# Patient Record
Sex: MALE | Race: WHITE | NOT HISPANIC OR LATINO
[De-identification: names, ages, dates, MRNs, and addresses within clinical notes are randomized per-mention and may not be internally consistent; named-entity substitution may affect disease eponyms.]

---

## 2017-02-27 ENCOUNTER — NON-APPOINTMENT (OUTPATIENT)
Age: 67
End: 2017-02-27

## 2017-02-27 ENCOUNTER — APPOINTMENT (OUTPATIENT)
Dept: CARDIOLOGY | Facility: CLINIC | Age: 67
End: 2017-02-27

## 2017-02-27 VITALS
BODY MASS INDEX: 28.79 KG/M2 | DIASTOLIC BLOOD PRESSURE: 83 MMHG | SYSTOLIC BLOOD PRESSURE: 129 MMHG | OXYGEN SATURATION: 98 % | WEIGHT: 190 LBS | HEART RATE: 98 BPM | HEIGHT: 68 IN | RESPIRATION RATE: 17 BRPM

## 2017-02-27 DIAGNOSIS — R13.10 DYSPHAGIA, UNSPECIFIED: ICD-10-CM

## 2017-04-20 ENCOUNTER — TRANSCRIPTION ENCOUNTER (OUTPATIENT)
Age: 67
End: 2017-04-20

## 2017-05-15 ENCOUNTER — APPOINTMENT (OUTPATIENT)
Dept: CARDIOLOGY | Facility: CLINIC | Age: 67
End: 2017-05-15

## 2017-06-12 ENCOUNTER — APPOINTMENT (OUTPATIENT)
Dept: CARDIOLOGY | Facility: CLINIC | Age: 67
End: 2017-06-12

## 2017-06-13 ENCOUNTER — MESSAGE (OUTPATIENT)
Age: 67
End: 2017-06-13

## 2017-09-11 ENCOUNTER — APPOINTMENT (OUTPATIENT)
Dept: CARDIOLOGY | Facility: CLINIC | Age: 67
End: 2017-09-11

## 2018-02-26 ENCOUNTER — MEDICATION RENEWAL (OUTPATIENT)
Age: 68
End: 2018-02-26

## 2018-03-16 LAB
25(OH)D3 SERPL-MCNC: 31.7 NG/ML
ALBUMIN SERPL ELPH-MCNC: 4.7 G/DL
ALP BLD-CCNC: 47 U/L
ALT SERPL-CCNC: 12 U/L
ANION GAP SERPL CALC-SCNC: 18 MMOL/L
APPEARANCE: CLEAR
AST SERPL-CCNC: 18 U/L
BACTERIA: NEGATIVE
BASOPHILS # BLD AUTO: 0.02 K/UL
BASOPHILS NFR BLD AUTO: 0.3 %
BILIRUB SERPL-MCNC: 0.8 MG/DL
BILIRUBIN URINE: NEGATIVE
BLOOD URINE: NEGATIVE
BUN SERPL-MCNC: 15 MG/DL
CALCIUM SERPL-MCNC: 10 MG/DL
CHLORIDE SERPL-SCNC: 107 MMOL/L
CHOLEST SERPL-MCNC: 132 MG/DL
CHOLEST/HDLC SERPL: 3.9 RATIO
CO2 SERPL-SCNC: 23 MMOL/L
COLOR: YELLOW
CREAT SERPL-MCNC: 1.09 MG/DL
EOSINOPHIL # BLD AUTO: 0.07 K/UL
EOSINOPHIL NFR BLD AUTO: 0.9 %
GLUCOSE QUALITATIVE U: NEGATIVE MG/DL
GLUCOSE SERPL-MCNC: 110 MG/DL
HBA1C MFR BLD HPLC: 6.7 %
HCT VFR BLD CALC: 41.8 %
HDLC SERPL-MCNC: 34 MG/DL
HGB BLD-MCNC: 14.3 G/DL
HYALINE CASTS: 3 /LPF
IMM GRANULOCYTES NFR BLD AUTO: 0 %
KETONES URINE: ABNORMAL
LDLC SERPL CALC-MCNC: 68 MG/DL
LEUKOCYTE ESTERASE URINE: NEGATIVE
LYMPHOCYTES # BLD AUTO: 2.64 K/UL
LYMPHOCYTES NFR BLD AUTO: 35 %
MAN DIFF?: NORMAL
MCHC RBC-ENTMCNC: 32.9 PG
MCHC RBC-ENTMCNC: 34.2 GM/DL
MCV RBC AUTO: 96.3 FL
MICROSCOPIC-UA: NORMAL
MONOCYTES # BLD AUTO: 0.46 K/UL
MONOCYTES NFR BLD AUTO: 6.1 %
NEUTROPHILS # BLD AUTO: 4.36 K/UL
NEUTROPHILS NFR BLD AUTO: 57.7 %
NITRITE URINE: NEGATIVE
PH URINE: 5.5
PLATELET # BLD AUTO: 264 K/UL
POTASSIUM SERPL-SCNC: 4.6 MMOL/L
PROT SERPL-MCNC: 7.4 G/DL
PROTEIN URINE: NEGATIVE MG/DL
PSA SERPL-MCNC: 1.12 NG/ML
RBC # BLD: 4.34 M/UL
RBC # FLD: 13 %
RED BLOOD CELLS URINE: 3 /HPF
SODIUM SERPL-SCNC: 148 MMOL/L
SPECIFIC GRAVITY URINE: 1.02
SQUAMOUS EPITHELIAL CELLS: 1 /HPF
TRIGL SERPL-MCNC: 152 MG/DL
TSH SERPL-ACNC: 3.76 UIU/ML
UROBILINOGEN URINE: 1 MG/DL
WBC # FLD AUTO: 7.55 K/UL
WHITE BLOOD CELLS URINE: 2 /HPF

## 2018-03-19 ENCOUNTER — APPOINTMENT (OUTPATIENT)
Dept: INTERNAL MEDICINE | Facility: CLINIC | Age: 68
End: 2018-03-19
Payer: MEDICARE

## 2018-03-19 VITALS
HEART RATE: 70 BPM | SYSTOLIC BLOOD PRESSURE: 120 MMHG | RESPIRATION RATE: 16 BRPM | WEIGHT: 190 LBS | BODY MASS INDEX: 28.79 KG/M2 | DIASTOLIC BLOOD PRESSURE: 70 MMHG | HEIGHT: 68 IN

## 2018-03-19 PROCEDURE — 99214 OFFICE O/P EST MOD 30 MIN: CPT

## 2018-03-19 RX ORDER — LEVOTHYROXINE SODIUM 0.03 MG/1
25 TABLET ORAL
Qty: 90 | Refills: 0 | Status: DISCONTINUED | COMMUNITY
Start: 2017-01-26 | End: 2018-03-19

## 2019-03-04 ENCOUNTER — MEDICATION RENEWAL (OUTPATIENT)
Age: 69
End: 2019-03-04

## 2019-03-08 ENCOUNTER — TRANSCRIPTION ENCOUNTER (OUTPATIENT)
Age: 69
End: 2019-03-08

## 2019-03-25 ENCOUNTER — APPOINTMENT (OUTPATIENT)
Dept: INTERNAL MEDICINE | Facility: CLINIC | Age: 69
End: 2019-03-25
Payer: MEDICARE

## 2019-03-25 VITALS
RESPIRATION RATE: 17 BRPM | HEIGHT: 68 IN | DIASTOLIC BLOOD PRESSURE: 80 MMHG | SYSTOLIC BLOOD PRESSURE: 130 MMHG | WEIGHT: 197 LBS | BODY MASS INDEX: 29.86 KG/M2 | OXYGEN SATURATION: 97 % | HEART RATE: 90 BPM | TEMPERATURE: 97.8 F

## 2019-03-25 DIAGNOSIS — I63.411 CEREBRAL INFARCTION DUE TO EMBOLISM OF RIGHT MIDDLE CEREBRAL ARTERY: ICD-10-CM

## 2019-03-25 DIAGNOSIS — R07.9 CHEST PAIN, UNSPECIFIED: ICD-10-CM

## 2019-03-25 DIAGNOSIS — I63.9 CEREBRAL INFARCTION, UNSPECIFIED: ICD-10-CM

## 2019-03-25 PROCEDURE — 99497 ADVNCD CARE PLAN 30 MIN: CPT | Mod: 25

## 2019-03-25 PROCEDURE — G0439: CPT | Mod: 26

## 2019-03-25 RX ORDER — TIZANIDINE 4 MG/1
4 TABLET ORAL
Qty: 90 | Refills: 3 | Status: DISCONTINUED | COMMUNITY
Start: 2018-03-19 | End: 2019-03-25

## 2019-03-25 RX ORDER — HYDROMORPHONE HYDROCHLORIDE 4 MG/1
4 TABLET ORAL TWICE DAILY
Qty: 60 | Refills: 0 | Status: DISCONTINUED | COMMUNITY
Start: 2018-03-19 | End: 2019-03-25

## 2019-03-25 RX ORDER — TIZANIDINE 4 MG/1
4 TABLET ORAL
Qty: 90 | Refills: 0 | Status: DISCONTINUED | COMMUNITY
Start: 2017-01-31 | End: 2019-03-25

## 2019-03-25 RX ORDER — TRAMADOL HYDROCHLORIDE AND ACETAMINOPHEN 37.5; 325 MG/1; MG/1
37.5-325 TABLET, FILM COATED ORAL
Qty: 45 | Refills: 0 | Status: DISCONTINUED | COMMUNITY
Start: 2017-01-26 | End: 2019-03-25

## 2019-03-25 RX ORDER — DICLOFENAC SODIUM 10 MG/G
1 GEL TOPICAL
Qty: 3 | Refills: 5 | Status: DISCONTINUED | COMMUNITY
Start: 2018-03-19 | End: 2019-03-25

## 2019-03-25 NOTE — HEALTH RISK ASSESSMENT
[Good] : ~his/her~  mood as  good [No falls in past year] : Patient reported no falls in the past year [0] : 2) Feeling down, depressed, or hopeless: Not at all (0) [Patient declined colonoscopy] : Patient declined colonoscopy [HIV test declined] : HIV test declined [Hepatitis C test declined] : Hepatitis C test declined [None] : None [Retired] : retired [High School] : high school [] :  [# Of Children ___] : has [unfilled] children [Feels Safe at Home] : Feels safe at home [Fully functional (bathing, dressing, toileting, transferring, walking, feeding)] : Fully functional (bathing, dressing, toileting, transferring, walking, feeding) [Fully functional (using the telephone, shopping, preparing meals, housekeeping, doing laundry, using] : Fully functional and needs no help or supervision to perform IADLs (using the telephone, shopping, preparing meals, housekeeping, doing laundry, using transportation, managing medications and managing finances) [Smoke Detector] : smoke detector [Carbon Monoxide Detector] : carbon monoxide detector [Guns at Home] : guns at home [Safety elements used in home] : safety elements used in home [Seat Belt] :  uses seat belt [Sunscreen] : uses sunscreen [Reviewed updated] : Reviewed updated [Designated Healthcare Proxy] : Designated healthcare proxy [Name: ___] : Health Care Proxy's Name: [unfilled]  [Relationship: ___] : Relationship: [unfilled] [Aggressive treatment] : aggressive treatment [I will adhere to the patient's wishes as expressed in the advance directive except as noted below.] : I will adhere to the patient's wishes as expressed in the advance directive except as noted below [FreeTextEntry1] : hand arthritis frozen left shoulder [] : No [de-identified] : 2 beers Bass daily [de-identified] : little [Change in mental status noted] : No change in mental status noted [Language] : denies difficulty with language [Behavior] : denies difficulty with behavior [Learning/Retaining New Information] : denies difficulty learning/retaining new information [Handling Complex Tasks] : denies difficulty handling complex tasks [Reasoning] : denies difficulty with reasoning [Spatial Ability and Orientation] : denies difficulty with spatial ability and orientation [Reports changes in hearing] : Reports no changes in hearing [Reports changes in dental health] : Reports no changes in dental health [Travel to Developing Areas] : does not  travel to developing areas [TB Exposure] : is not being exposed to tuberculosis [Caregiver Concerns] : does not have caregiver concerns [FreeTextEntry2] : "handiman" [de-identified] : ebony [AdvancecareDate] : 03/2019 [FreeTextEntry4] : Goals of care conversation on this visit also discussed situation with patient that in the event they suffer an injury or accident where there is little chance for meaningful life, the patient states that they do NOT want to be maintained in this state ("as a vegetable"), and that there should be NO LIFE SUSTAINING MEASURES in this instance\par This includes example of being brain dead, not be be maintained on mechanical ventilation, and no feeding tubes\par \par Other treatments in cases where there IS a chance for meaningful recovery that were deemed acceptable to the patient include\par -hospializaton for most conditions including treatment for typical community acquired medical conditions such as pneumonia, heart related issues, GI Issues, etc. \par -IV fluids\par -blood transfusions\par -antibiotics

## 2019-03-25 NOTE — COUNSELING
[Weight management counseling provided] : Weight management [Healthy eating counseling provided] : healthy eating [Activity counseling provided] : activity [___ min/wk activity recommended] : [unfilled] min/wk activity recommended

## 2019-04-05 ENCOUNTER — TRANSCRIPTION ENCOUNTER (OUTPATIENT)
Age: 69
End: 2019-04-05

## 2019-04-05 LAB
ALBUMIN SERPL ELPH-MCNC: 4.3 G/DL
ALP BLD-CCNC: 54 U/L
ALT SERPL-CCNC: 19 U/L
ANION GAP SERPL CALC-SCNC: 15 MMOL/L
APPEARANCE: CLEAR
AST SERPL-CCNC: 19 U/L
BACTERIA: NEGATIVE
BASOPHILS # BLD AUTO: 0.05 K/UL
BASOPHILS NFR BLD AUTO: 0.7 %
BILIRUB SERPL-MCNC: 0.6 MG/DL
BILIRUBIN URINE: NEGATIVE
BLOOD URINE: NEGATIVE
BUN SERPL-MCNC: 11 MG/DL
CALCIUM SERPL-MCNC: 10.2 MG/DL
CHLORIDE SERPL-SCNC: 104 MMOL/L
CHOLEST SERPL-MCNC: 124 MG/DL
CHOLEST/HDLC SERPL: 3.5 RATIO
CO2 SERPL-SCNC: 25 MMOL/L
COLOR: NORMAL
CREAT SERPL-MCNC: 1.06 MG/DL
CREAT SPEC-SCNC: 129 MG/DL
EOSINOPHIL # BLD AUTO: 0.12 K/UL
EOSINOPHIL NFR BLD AUTO: 1.7 %
ESTIMATED AVERAGE GLUCOSE: 166 MG/DL
GLUCOSE QUALITATIVE U: NEGATIVE
GLUCOSE SERPL-MCNC: 128 MG/DL
HBA1C MFR BLD HPLC: 7.4 %
HCT VFR BLD CALC: 44.2 %
HDLC SERPL-MCNC: 35 MG/DL
HGB BLD-MCNC: 14.8 G/DL
HYALINE CASTS: 0 /LPF
IMM GRANULOCYTES NFR BLD AUTO: 0.1 %
KETONES URINE: NEGATIVE
LDLC SERPL CALC-MCNC: 59 MG/DL
LEUKOCYTE ESTERASE URINE: NEGATIVE
LYMPHOCYTES # BLD AUTO: 2.87 K/UL
LYMPHOCYTES NFR BLD AUTO: 39.8 %
MAN DIFF?: NORMAL
MCHC RBC-ENTMCNC: 32.2 PG
MCHC RBC-ENTMCNC: 33.5 GM/DL
MCV RBC AUTO: 96.1 FL
MICROALBUMIN 24H UR DL<=1MG/L-MCNC: <1.2 MG/DL
MICROALBUMIN/CREAT 24H UR-RTO: NORMAL MG/G
MICROSCOPIC-UA: NORMAL
MONOCYTES # BLD AUTO: 0.48 K/UL
MONOCYTES NFR BLD AUTO: 6.7 %
NEUTROPHILS # BLD AUTO: 3.68 K/UL
NEUTROPHILS NFR BLD AUTO: 51 %
NITRITE URINE: NEGATIVE
PH URINE: 6
PLATELET # BLD AUTO: 273 K/UL
POTASSIUM SERPL-SCNC: 4.6 MMOL/L
PROT SERPL-MCNC: 7.1 G/DL
PROTEIN URINE: NEGATIVE
PSA SERPL-MCNC: 1.45 NG/ML
RBC # BLD: 4.6 M/UL
RBC # FLD: 12.6 %
RED BLOOD CELLS URINE: 1 /HPF
SODIUM SERPL-SCNC: 144 MMOL/L
SPECIFIC GRAVITY URINE: 1.02
SQUAMOUS EPITHELIAL CELLS: 0 /HPF
TRIGL SERPL-MCNC: 150 MG/DL
TSH SERPL-ACNC: 4.28 UIU/ML
UROBILINOGEN URINE: NORMAL
WBC # FLD AUTO: 7.21 K/UL
WHITE BLOOD CELLS URINE: 4 /HPF

## 2019-04-08 ENCOUNTER — TRANSCRIPTION ENCOUNTER (OUTPATIENT)
Age: 69
End: 2019-04-08

## 2019-04-08 LAB — 25(OH)D3 SERPL-MCNC: 21.2 NG/ML

## 2019-04-23 ENCOUNTER — RX RENEWAL (OUTPATIENT)
Age: 69
End: 2019-04-23

## 2019-07-22 ENCOUNTER — MEDICATION RENEWAL (OUTPATIENT)
Age: 69
End: 2019-07-22

## 2019-10-28 ENCOUNTER — APPOINTMENT (OUTPATIENT)
Dept: INTERNAL MEDICINE | Facility: CLINIC | Age: 69
End: 2019-10-28
Payer: MEDICARE

## 2019-10-28 VITALS
TEMPERATURE: 98.3 F | WEIGHT: 194 LBS | BODY MASS INDEX: 28.73 KG/M2 | OXYGEN SATURATION: 98 % | SYSTOLIC BLOOD PRESSURE: 126 MMHG | HEIGHT: 69 IN | DIASTOLIC BLOOD PRESSURE: 80 MMHG | HEART RATE: 104 BPM | RESPIRATION RATE: 17 BRPM

## 2019-10-28 DIAGNOSIS — R10.13 EPIGASTRIC PAIN: ICD-10-CM

## 2019-10-28 PROCEDURE — 99214 OFFICE O/P EST MOD 30 MIN: CPT

## 2019-10-28 NOTE — HISTORY OF PRESENT ILLNESS
[FreeTextEntry8] : Abdominal pain\par -last 10 days\par -epigastric, burning pain\par -took OTC's no relief\par -also had angina attack as well and "rubbed it" it went away\par -nauseous as well and dry heave\par -pain has been intermittent\par -in past took GB leanse and it was too much / olive oil \par \par

## 2019-10-29 ENCOUNTER — FORM ENCOUNTER (OUTPATIENT)
Age: 69
End: 2019-10-29

## 2019-10-30 ENCOUNTER — OUTPATIENT (OUTPATIENT)
Dept: OUTPATIENT SERVICES | Facility: HOSPITAL | Age: 69
LOS: 1 days | End: 2019-10-30
Payer: MEDICARE

## 2019-10-30 ENCOUNTER — APPOINTMENT (OUTPATIENT)
Dept: ULTRASOUND IMAGING | Facility: CLINIC | Age: 69
End: 2019-10-30
Payer: MEDICARE

## 2019-10-30 DIAGNOSIS — Z00.8 ENCOUNTER FOR OTHER GENERAL EXAMINATION: ICD-10-CM

## 2019-10-30 PROCEDURE — 76700 US EXAM ABDOM COMPLETE: CPT | Mod: 26

## 2019-10-30 PROCEDURE — 76700 US EXAM ABDOM COMPLETE: CPT

## 2019-11-04 LAB
ALBUMIN SERPL ELPH-MCNC: 4.9 G/DL
ALP BLD-CCNC: 54 U/L
ALT SERPL-CCNC: 17 U/L
AMYLASE/CREAT SERPL: 85 U/L
ANION GAP SERPL CALC-SCNC: 15 MMOL/L
AST SERPL-CCNC: 19 U/L
BASOPHILS # BLD AUTO: 0.08 K/UL
BASOPHILS NFR BLD AUTO: 0.8 %
BILIRUB SERPL-MCNC: 0.5 MG/DL
BUN SERPL-MCNC: 15 MG/DL
CALCIUM SERPL-MCNC: 10.8 MG/DL
CHLORIDE SERPL-SCNC: 98 MMOL/L
CO2 SERPL-SCNC: 28 MMOL/L
CREAT SERPL-MCNC: 1.07 MG/DL
EOSINOPHIL # BLD AUTO: 0.07 K/UL
EOSINOPHIL NFR BLD AUTO: 0.7 %
GGT SERPL-CCNC: 29 U/L
GLUCOSE SERPL-MCNC: 220 MG/DL
HCT VFR BLD CALC: 47.2 %
HGB BLD-MCNC: 15.6 G/DL
IMM GRANULOCYTES NFR BLD AUTO: 0.3 %
LPL SERPL-CCNC: 91 U/L
LYMPHOCYTES # BLD AUTO: 3.28 K/UL
LYMPHOCYTES NFR BLD AUTO: 31.8 %
MAN DIFF?: NORMAL
MCHC RBC-ENTMCNC: 32.2 PG
MCHC RBC-ENTMCNC: 33.1 GM/DL
MCV RBC AUTO: 97.3 FL
MONOCYTES # BLD AUTO: 0.77 K/UL
MONOCYTES NFR BLD AUTO: 7.5 %
NEUTROPHILS # BLD AUTO: 6.09 K/UL
NEUTROPHILS NFR BLD AUTO: 58.9 %
PLATELET # BLD AUTO: 296 K/UL
POTASSIUM SERPL-SCNC: 4.1 MMOL/L
PROT SERPL-MCNC: 7.5 G/DL
RBC # BLD: 4.85 M/UL
RBC # FLD: 12.4 %
SODIUM SERPL-SCNC: 141 MMOL/L
WBC # FLD AUTO: 10.32 K/UL

## 2019-11-05 ENCOUNTER — TRANSCRIPTION ENCOUNTER (OUTPATIENT)
Age: 69
End: 2019-11-05

## 2020-09-09 ENCOUNTER — LABORATORY RESULT (OUTPATIENT)
Age: 70
End: 2020-09-09

## 2020-09-09 DIAGNOSIS — Z11.59 ENCOUNTER FOR SCREENING FOR OTHER VIRAL DISEASES: ICD-10-CM

## 2020-10-28 ENCOUNTER — LABORATORY RESULT (OUTPATIENT)
Age: 70
End: 2020-10-28

## 2020-10-28 LAB
25(OH)D3 SERPL-MCNC: 28.1 NG/ML
ALBUMIN SERPL ELPH-MCNC: 4.5 G/DL
ALP BLD-CCNC: 61 U/L
ALT SERPL-CCNC: 22 U/L
ANION GAP SERPL CALC-SCNC: 12 MMOL/L
AST SERPL-CCNC: 23 U/L
BASOPHILS # BLD AUTO: 0.03 K/UL
BASOPHILS NFR BLD AUTO: 0.4 %
BILIRUB SERPL-MCNC: 0.7 MG/DL
BUN SERPL-MCNC: 10 MG/DL
CALCIUM SERPL-MCNC: 9.1 MG/DL
CHLORIDE SERPL-SCNC: 104 MMOL/L
CHOLEST SERPL-MCNC: 126 MG/DL
CO2 SERPL-SCNC: 24 MMOL/L
CREAT SERPL-MCNC: 1.17 MG/DL
EOSINOPHIL # BLD AUTO: 0.13 K/UL
EOSINOPHIL NFR BLD AUTO: 1.9 %
ESTIMATED AVERAGE GLUCOSE: 171 MG/DL
GLUCOSE SERPL-MCNC: 126 MG/DL
HBA1C MFR BLD HPLC: 7.6 %
HCT VFR BLD CALC: 42.7 %
HDLC SERPL-MCNC: 34 MG/DL
HGB BLD-MCNC: 14.4 G/DL
IMM GRANULOCYTES NFR BLD AUTO: 0.1 %
LDLC SERPL CALC-MCNC: 69 MG/DL
LYMPHOCYTES # BLD AUTO: 2.43 K/UL
LYMPHOCYTES NFR BLD AUTO: 35.9 %
MAN DIFF?: NORMAL
MCHC RBC-ENTMCNC: 32.7 PG
MCHC RBC-ENTMCNC: 33.7 GM/DL
MCV RBC AUTO: 96.8 FL
MONOCYTES # BLD AUTO: 0.59 K/UL
MONOCYTES NFR BLD AUTO: 8.7 %
NEUTROPHILS # BLD AUTO: 3.58 K/UL
NEUTROPHILS NFR BLD AUTO: 53 %
NONHDLC SERPL-MCNC: 92 MG/DL
PLATELET # BLD AUTO: 263 K/UL
POTASSIUM SERPL-SCNC: 4.5 MMOL/L
PROT SERPL-MCNC: 6.7 G/DL
PSA FREE FLD-MCNC: 45 %
PSA FREE SERPL-MCNC: 0.65 NG/ML
PSA SERPL-MCNC: 1.46 NG/ML
RBC # BLD: 4.41 M/UL
RBC # FLD: 12.3 %
SODIUM SERPL-SCNC: 139 MMOL/L
TRIGL SERPL-MCNC: 115 MG/DL
TSH SERPL-ACNC: 4.29 UIU/ML
WBC # FLD AUTO: 6.77 K/UL

## 2020-10-29 LAB
APPEARANCE: CLEAR
BILIRUBIN URINE: NEGATIVE
BLOOD URINE: NEGATIVE
COLOR: NORMAL
GLUCOSE QUALITATIVE U: NEGATIVE
KETONES URINE: NEGATIVE
LEUKOCYTE ESTERASE URINE: ABNORMAL
NITRITE URINE: NEGATIVE
PH URINE: 7
PROTEIN URINE: NORMAL
SARS-COV-2 IGG SERPL IA-ACNC: <3.8 AU/ML
SARS-COV-2 IGG SERPL QL IA: NEGATIVE
SPECIFIC GRAVITY URINE: 1.01
UROBILINOGEN URINE: NORMAL

## 2020-11-03 ENCOUNTER — APPOINTMENT (OUTPATIENT)
Dept: INTERNAL MEDICINE | Facility: CLINIC | Age: 70
End: 2020-11-03
Payer: MEDICARE

## 2020-11-03 VITALS
HEART RATE: 92 BPM | SYSTOLIC BLOOD PRESSURE: 120 MMHG | OXYGEN SATURATION: 98 % | TEMPERATURE: 97.9 F | DIASTOLIC BLOOD PRESSURE: 70 MMHG | HEIGHT: 69 IN | WEIGHT: 203 LBS | RESPIRATION RATE: 16 BRPM | BODY MASS INDEX: 30.07 KG/M2

## 2020-11-03 DIAGNOSIS — Z28.21 IMMUNIZATION NOT CARRIED OUT BECAUSE OF PATIENT REFUSAL: ICD-10-CM

## 2020-11-03 DIAGNOSIS — Z53.20 PROCEDURE AND TREATMENT NOT CARRIED OUT BECAUSE OF PATIENT'S DECISION FOR UNSPECIFIED REASONS: ICD-10-CM

## 2020-11-03 DIAGNOSIS — Z71.89 OTHER SPECIFIED COUNSELING: ICD-10-CM

## 2020-11-03 PROCEDURE — G0439: CPT

## 2020-11-03 PROCEDURE — 99072 ADDL SUPL MATRL&STAF TM PHE: CPT

## 2020-11-03 PROCEDURE — G0442 ANNUAL ALCOHOL SCREEN 15 MIN: CPT

## 2020-11-03 PROCEDURE — 99497 ADVNCD CARE PLAN 30 MIN: CPT | Mod: 33

## 2020-11-03 RX ORDER — NITROGLYCERIN 0.4 MG/1
0.4 TABLET SUBLINGUAL
Qty: 30 | Refills: 1 | Status: ACTIVE | COMMUNITY
Start: 2020-11-03 | End: 1900-01-01

## 2020-11-03 NOTE — HEALTH RISK ASSESSMENT
[Very Good] : ~his/her~  mood as very good [Yes] : Yes [2 - 3 times a week (3 pts)] : 2 - 3  times a week (3 points) [1 or 2 (0 pts)] : 1 or 2 (0 points) [Never (0 pts)] : Never (0 points) [No] : In the past 12 months have you used drugs other than those required for medical reasons? No [No falls in past year] : Patient reported no falls in the past year [0] : 2) Feeling down, depressed, or hopeless: Not at all (0) [(PHQ-2) Unable to screen] : PHQ-2: unable to screen [Patient declined colonoscopy] : Patient declined colonoscopy [HIV test declined] : HIV test declined [Hepatitis C test declined] : Hepatitis C test declined [With Patient/Caregiver] : With Patient/Caregiver [Designated Healthcare Proxy] : Designated healthcare proxy [Name: ___] : Health Care Proxy's Name: [unfilled]  [Relationship: ___] : Relationship: [unfilled] [Aggressive treatment] : aggressive treatment [I will adhere to the patient's wishes as expressed in the advance directive except as noted below.] : I will adhere to the patient's wishes as expressed in the advance directive except as noted below [FreeTextEntry1] : "getting old" middle finger stiffness and tightness [] : No [de-identified] : none [de-identified] : none [de-identified] : volunteer  very active [de-identified] : good [Change in mental status noted] : No change in mental status noted [Language] : denies difficulty with language [Handling Complex Tasks] : denies difficulty handling complex tasks [Reasoning] : denies difficulty with reasoning [FreeTextEntry4] : Goals of care conversation on this visit also discussed situation with patient that in the event they suffer an injury or accident where there is little chance for meaningful life, the patient states that they do NOT want to be maintained in this state ("as a vegetable"), and that there should be NO LIFE SUSTAINING MEASURES in this instance\par This includes example of being brain dead, not be be maintained on mechanical ventilation, and no feeding tubes\par \par Other treatments in cases where there IS a chance for meaningful recovery that were deemed acceptable to the patient include\par -hospializaton for most conditions including treatment for typical community acquired medical conditions such as pneumonia, heart related issues, GI Issues, etc. \par -IV fluids\par -blood transfusions\par -antibiotics\par \par If gets COVID-19 will address CPR status  at that time\par ACP 16mins\par

## 2020-11-03 NOTE — HISTORY OF PRESENT ILLNESS
[FreeTextEntry1] : Here for annual  [de-identified] : Milddle finger stiffness in AM\par -worse when wakes up\par -worse when not using\par \par

## 2021-05-04 ENCOUNTER — APPOINTMENT (OUTPATIENT)
Dept: RADIOLOGY | Facility: CLINIC | Age: 71
End: 2021-05-04
Payer: MEDICARE

## 2021-05-04 ENCOUNTER — APPOINTMENT (OUTPATIENT)
Dept: INTERNAL MEDICINE | Facility: CLINIC | Age: 71
End: 2021-05-04
Payer: MEDICARE

## 2021-05-04 ENCOUNTER — OUTPATIENT (OUTPATIENT)
Dept: OUTPATIENT SERVICES | Facility: HOSPITAL | Age: 71
LOS: 1 days | End: 2021-05-04
Payer: MEDICARE

## 2021-05-04 ENCOUNTER — RESULT REVIEW (OUTPATIENT)
Age: 71
End: 2021-05-04

## 2021-05-04 VITALS
HEIGHT: 60 IN | WEIGHT: 191 LBS | OXYGEN SATURATION: 98 % | SYSTOLIC BLOOD PRESSURE: 138 MMHG | HEART RATE: 91 BPM | TEMPERATURE: 98 F | DIASTOLIC BLOOD PRESSURE: 86 MMHG | BODY MASS INDEX: 37.5 KG/M2

## 2021-05-04 DIAGNOSIS — M75.01 ADHESIVE CAPSULITIS OF RIGHT SHOULDER: ICD-10-CM

## 2021-05-04 PROCEDURE — 73030 X-RAY EXAM OF SHOULDER: CPT | Mod: 26,RT

## 2021-05-04 PROCEDURE — 99213 OFFICE O/P EST LOW 20 MIN: CPT

## 2021-05-04 PROCEDURE — 99072 ADDL SUPL MATRL&STAF TM PHE: CPT

## 2021-05-04 PROCEDURE — 73030 X-RAY EXAM OF SHOULDER: CPT

## 2021-05-06 NOTE — HISTORY OF PRESENT ILLNESS
[FreeTextEntry8] : Seen in OB\par \par shoulder pain\par -3 years ago had a problem, but did PT< Dr Soriano and got better\par - now has right shoulder pain lying on left side and reaching over to right with right arm  heard a pop\par \par -took oxycodone and hydropmoprphone\par used voltaren no relief\par \par Using OTC creams initially working now not\par -taking tylenol\par - pain with movement and at times sitting there at rest\par - decreased ROM\par - worse at night\par  [Spouse] : spouse

## 2021-05-06 NOTE — PHYSICAL EXAM
[No Acute Distress] : no acute distress [Well Nourished] : well nourished [Well Developed] : well developed [Well-Appearing] : well-appearing [Normal Sclera/Conjunctiva] : normal sclera/conjunctiva [PERRL] : pupils equal round and reactive to light [EOMI] : extraocular movements intact [Normal Outer Ear/Nose] : the outer ears and nose were normal in appearance [Normal Oropharynx] : the oropharynx was normal [No JVD] : no jugular venous distention [No Lymphadenopathy] : no lymphadenopathy [Supple] : supple [Thyroid Normal, No Nodules] : the thyroid was normal and there were no nodules present [No Respiratory Distress] : no respiratory distress  [No Accessory Muscle Use] : no accessory muscle use [Clear to Auscultation] : lungs were clear to auscultation bilaterally [Normal Rate] : normal rate  [Regular Rhythm] : with a regular rhythm [Normal S1, S2] : normal S1 and S2 [No Murmur] : no murmur heard [No Carotid Bruits] : no carotid bruits [No Abdominal Bruit] : a ~M bruit was not heard ~T in the abdomen [No Varicosities] : no varicosities [Pedal Pulses Present] : the pedal pulses are present [No Edema] : there was no peripheral edema [No Palpable Aorta] : no palpable aorta [No Extremity Clubbing/Cyanosis] : no extremity clubbing/cyanosis [Soft] : abdomen soft [Non Tender] : non-tender [Non-distended] : non-distended [No Masses] : no abdominal mass palpated [No HSM] : no HSM [Normal Bowel Sounds] : normal bowel sounds [Normal Posterior Cervical Nodes] : no posterior cervical lymphadenopathy [Normal Anterior Cervical Nodes] : no anterior cervical lymphadenopathy [No CVA Tenderness] : no CVA  tenderness [No Spinal Tenderness] : no spinal tenderness [No Joint Swelling] : no joint swelling [Grossly Normal Strength/Tone] : grossly normal strength/tone [No Rash] : no rash [Coordination Grossly Intact] : coordination grossly intact [No Focal Deficits] : no focal deficits [Normal Gait] : normal gait [Deep Tendon Reflexes (DTR)] : deep tendon reflexes were 2+ and symmetric [Normal Affect] : the affect was normal [Normal Insight/Judgement] : insight and judgment were intact [de-identified] : decreased ROM right shoulder to slightly above head

## 2021-05-10 ENCOUNTER — APPOINTMENT (OUTPATIENT)
Dept: ORTHOPEDIC SURGERY | Facility: CLINIC | Age: 71
End: 2021-05-10
Payer: MEDICARE

## 2021-05-10 VITALS — BODY MASS INDEX: 28.04 KG/M2 | HEIGHT: 68 IN | WEIGHT: 185 LBS

## 2021-05-10 DIAGNOSIS — Z86.39 PERSONAL HISTORY OF OTHER ENDOCRINE, NUTRITIONAL AND METABOLIC DISEASE: ICD-10-CM

## 2021-05-10 DIAGNOSIS — S46.911A STRAIN OF UNSPECIFIED MUSCLE, FASCIA AND TENDON AT SHOULDER AND UPPER ARM LEVEL, RIGHT ARM, INITIAL ENCOUNTER: ICD-10-CM

## 2021-05-10 PROCEDURE — 99203 OFFICE O/P NEW LOW 30 MIN: CPT

## 2021-05-10 PROCEDURE — 99072 ADDL SUPL MATRL&STAF TM PHE: CPT

## 2021-05-10 NOTE — CONSULT LETTER
[Dear  ___] : Dear  [unfilled], [Consult Letter:] : I had the pleasure of evaluating your patient, [unfilled]. [Please see my note below.] : Please see my note below. [Consult Closing:] : Thank you very much for allowing me to participate in the care of this patient.  If you have any questions, please do not hesitate to contact me. [Sincerely,] : Sincerely, [FreeTextEntry3] : Dr. Elliot Soriano \par \par

## 2021-05-10 NOTE — DISCUSSION/SUMMARY
[de-identified] : The underlying pathophysiology was reviewed in great detail with the patient as well as the various treatment options, including ice, analgesics, NSAIDs, Physical therapy, steroid injections \par \par A prescription was provided for a MRI of the right shoulder to rule out rotator cuff tear \par \par Activity modifications and restrictions were discussed. I advised avoiding overhead lifting. I advised the patient to work on good posture.\par \par A prescription was provided for Tramadol 50 mg today. Discussed with patient to take this medication instead of the hydromorphone prescribed by Dr. Reyes on 05/04/2021. If patient needs surgery do not want him taking narcotics preoperatively. #: [id number] \par \par FU once MRI results are obtained. \par \par All questions were answered, all alternatives discussed and the patient is in complete agreement with that plan. Follow-up appointment as instructed. Any issues and the patient will call or come in sooner.

## 2021-05-10 NOTE — PHYSICAL EXAM
[de-identified] : Right Upper Extremity\par o Shoulder :\par ¦ Inspection/Palpation : tenderness over the greater tuberosity and proximal biceps tendon and biceps musculature, mild acromioclavicular joint tenderness, no  tenderness anterior and posterior glenohumeral joint,no swelling, no deformities\par ¦ Range of Motion : ACTIVE FORWARD ELEVATION: Measured at 100 degrees, ACTIVE EXTERNAL ROTATION: Measured at 50 degrees, ACTIVE INTERNAL ROTATION: Measured at GT\par ¦ Strength : external rotation 5/5, internal rotation 5/5, supraspinatus 4+/5 with pain. \par ¦ Stability : no joint instability on provocative testing\par ¦ Tests/Signs : Neer (+), Hanson (+), Speed’s Test (+) Yergason’s Test (+)\par o Upper Arm : no tenderness, no swelling, no deformities\par o Muscle Bulk : no atrophy\par o Sensation : sensation intact to light touch\par o Skin : no skin rash or discoloration\par o Vascular Exam : no edema, no cyanosis, radial and ulnar pulses normal\par \par Left Upper Extremity\par o Shoulder :\par ¦ Inspection/Palpation:  no tenderness over the greater tuberosity, no acromioclavicular joint tenderness, no tenderness anterior and posterior glenohumeral joint,no swelling, no deformities\par ¦ Range of Motion : ACTIVE FORWARD ELEVATION: Measured at 120 degrees, ACTIVE EXTERNAL ROTATION: Measured at 50 degrees, ACTIVE INTERNAL ROTATION: Measured at PSIS\par ¦ Strength : external rotation 5/5, internal rotation 5/5, supraspinatus 5/5\par ¦ Stability : no joint instability on provocative testing\par ¦ Tests/Signs : Neer (-), Hanson (-)\par o Upper Arm : no tenderness, no swelling, no deformities\par o Muscle Bulk : no atrophy\par o Sensation : sensation intact to light touch\par o Skin : no skin rash or discoloration\par o Vascular Exam : no edema, no cyanosis, radial and ulnar pulses normal [de-identified] : Patient comes to today's visit with outside imaging already performed. I reviewed the images in detail with the patient and discussed the findings as highlighted below.\par \par \par o Xray of the right shoulder performed on 05/04/2021 at [facility]: impression: \par ¦ No fractures, dislocations, or AC separation.\par ¦ Mild AC and glenohumeral joint osteoarthrosis. Spinal degenerative change also apparent.\par ¦ Maintained subacromial and coracoclavicular spaces.\par ¦ No destructive osseous lesions or periosteal reaction.\par ¦ No periarticular soft tissue calcifications.

## 2021-05-10 NOTE — HISTORY OF PRESENT ILLNESS
[de-identified] : AMY BREWER is a 71 year old RHD male presenting to the office complaining of right shoulder pain.  Patient reports pain since November 2020. He reports reaching back while laying in bed to pet his dog noting a loud, audible pop in his shoulder associated with pain. Patient notes continued pain ever since. The patient describes the pain as a dull aching, and occasionally sharp pain localized to the anterior aspect of his right shoulder that is intermittent in nature. His  symptoms are exacerbated  with any movement of the shoulder. Patient reports the pain is waking him up at night.  Patient reports associated weakness. Denies numbness and tingling in the upper extremity. Patient saw his PCP who ordered a xray of the shoulder. He presents today with the xray from [facility].   Patient notes a history of left shoulder adhesive capsulitis. patient notes he was seen by Dr. Soriano and was given a corticosteroid injection and his symptoms resolved in approximately one year. Patient is taking Hydromorphone, and oxycodone as prescribed by his PCP for pain relief with moderate nighttime relief in symptoms. Of note patient is on Eliquis. Patient denies any other complaints at this time.

## 2021-05-11 ENCOUNTER — APPOINTMENT (OUTPATIENT)
Dept: MRI IMAGING | Facility: CLINIC | Age: 71
End: 2021-05-11
Payer: MEDICARE

## 2021-05-11 ENCOUNTER — OUTPATIENT (OUTPATIENT)
Dept: OUTPATIENT SERVICES | Facility: HOSPITAL | Age: 71
LOS: 1 days | End: 2021-05-11
Payer: MEDICARE

## 2021-05-11 DIAGNOSIS — M25.511 PAIN IN RIGHT SHOULDER: ICD-10-CM

## 2021-05-11 DIAGNOSIS — Z00.8 ENCOUNTER FOR OTHER GENERAL EXAMINATION: ICD-10-CM

## 2021-05-11 PROCEDURE — 73221 MRI JOINT UPR EXTREM W/O DYE: CPT | Mod: 26,RT

## 2021-05-11 PROCEDURE — 73221 MRI JOINT UPR EXTREM W/O DYE: CPT

## 2021-05-20 ENCOUNTER — APPOINTMENT (OUTPATIENT)
Dept: ORTHOPEDIC SURGERY | Facility: CLINIC | Age: 71
End: 2021-05-20
Payer: MEDICARE

## 2021-05-20 DIAGNOSIS — M75.81 OTHER SHOULDER LESIONS, RIGHT SHOULDER: ICD-10-CM

## 2021-05-20 PROCEDURE — 20611 DRAIN/INJ JOINT/BURSA W/US: CPT | Mod: RT

## 2021-05-20 PROCEDURE — 99214 OFFICE O/P EST MOD 30 MIN: CPT | Mod: 25

## 2021-05-20 PROCEDURE — 99072 ADDL SUPL MATRL&STAF TM PHE: CPT

## 2021-05-20 NOTE — PHYSICAL EXAM
[de-identified] : Right Upper Extremity\par o Shoulder :\par ¦ Inspection/Palpation : tenderness over the greater tuberosity and proximal biceps tendon and biceps musculature, mild acromioclavicular joint tenderness, no  tenderness anterior and posterior glenohumeral joint,no swelling, no deformities\par ¦ Range of Motion : ACTIVE FORWARD ELEVATION: Measured at 80 degrees, ACTIVE EXTERNAL ROTATION: Measured at 40 degrees, ACTIVE INTERNAL ROTATION: Measured at GT\par ¦ Strength : external rotation 5/5, internal rotation 5/5, supraspinatus 4+/5 with pain. \par ¦ Stability : no joint instability on provocative testing\par ¦ Tests/Signs : Neer (+), Hanson (+), Speed’s Test (+) Yergason’s Test (+)\par o Upper Arm : no tenderness, no swelling, no deformities\par o Muscle Bulk : no atrophy\par o Sensation : sensation intact to light touch\par o Skin : no skin rash or discoloration\par o Vascular Exam : no edema, no cyanosis, radial and ulnar pulses normal\par \par Left Upper Extremity\par o Shoulder :\par ¦ Inspection/Palpation:  no tenderness over the greater tuberosity, no acromioclavicular joint tenderness, no tenderness anterior and posterior glenohumeral joint,no swelling, no deformities\par ¦ Range of Motion : ACTIVE FORWARD ELEVATION: Measured at 120 degrees, ACTIVE EXTERNAL ROTATION: Measured at 50 degrees, ACTIVE INTERNAL ROTATION: Measured at PSIS\par ¦ Strength : external rotation 5/5, internal rotation 5/5, supraspinatus 5/5\par ¦ Stability : no joint instability on provocative testing\par ¦ Tests/Signs : Neer (-), Hanson (-)\par o Upper Arm : no tenderness, no swelling, no deformities\par o Muscle Bulk : no atrophy\par o Sensation : sensation intact to light touch\par o Skin : no skin rash or discoloration\par o Vascular Exam : no edema, no cyanosis, radial and ulnar pulses normal [de-identified] : Patient comes to today's visit with outside imaging already performed. I reviewed the images in detail with the patient and discussed the findings as highlighted below.\par \par \par o MRI of the right shoulder performed on 05/11/2021 at [facility]: impression: \par ¦ MRI findings suggestive of adhesive capsulitis.\par ¦ Mild supraspinatus and subscapularis tendinosis with mild undersurface partial tearing of the supraspinatus and mild undersurface fraying of the infraspinatus.\par ¦ Moderate to severe AC joint arthrosis with mild associated subacromial subdeltoid bursitis.

## 2021-05-20 NOTE — HISTORY OF PRESENT ILLNESS
[de-identified] : AMY BREWER is a 71 year old RHD male presenting to the office complaining of right shoulder pain.  Patient reports pain since November 2020. He reports reaching back while laying in bed to pet his dog noting a loud, audible pop in his shoulder associated with pain. Patient notes continued pain ever since. The patient describes the pain as a dull aching, and occasionally sharp pain localized to the anterior aspect of his right shoulder that is intermittent in nature. His  symptoms are exacerbated  with any movement of the shoulder. Patient reports the pain is waking him up at night.  Patient reports associated weakness. Denies numbness and tingling in the upper extremity. Patient saw his PCP who ordered a xray of the shoulder. He presents today with the xray from [facility].   Patient notes a history of left shoulder adhesive capsulitis. patient notes he was seen by Dr. Soriano and was given a corticosteroid injection and his symptoms resolved in approximately one year. Patient is taking Hydromorphone, and oxycodone as prescribed by his PCP for pain relief with moderate nighttime relief in symptoms. Of note patient is on Eliquis. Patient denies any other complaints at this time. Patient is here today for MRI review of the right shoulder

## 2021-05-20 NOTE — DISCUSSION/SUMMARY
[de-identified] : The underlying pathophysiology was reviewed in great detail with the patient as well as the various treatment options, including ice, analgesics, NSAIDs, Physical therapy, steroid injections \par \par MRI of the right shoulder was reviewed and discussed in great detail today. \par \par Patient received a corticosteroid injection of the right GH joint today. \par \par Activity modifications and restrictions were discussed. I advised avoiding overhead lifting. I advised the patient to work on good posture.\par \par A home exercise sheet was given and discussed with the patient to follow.A Thera-Band was provided for exercise program. \par \par FU 6 weeks. \par \par All questions were answered, all alternatives discussed and the patient is in complete agreement with that plan. Follow-up appointment as instructed. Any issues and the patient will call or come in sooner.

## 2021-05-20 NOTE — PROCEDURE
[de-identified] : At this point I recommended a therapeutic injection and under sterile precautions with US guidance, an injection of 4 cc 1% lidocaine with 0.5 cc of Kenalog and 0.5 cc of Dexamethasone- was placed into the glenohumeral joint of the Right shoulder without complication, and after several minutes, the patient felt significant relief.\par \par \par

## 2021-06-09 ENCOUNTER — INPATIENT (INPATIENT)
Facility: HOSPITAL | Age: 71
LOS: 4 days | Discharge: HOME | DRG: 378 | End: 2021-06-14
Attending: HOSPITALIST | Admitting: HOSPITALIST
Payer: MEDICARE

## 2021-06-09 ENCOUNTER — TRANSCRIPTION ENCOUNTER (OUTPATIENT)
Age: 71
End: 2021-06-09

## 2021-06-09 VITALS
WEIGHT: 177.91 LBS | DIASTOLIC BLOOD PRESSURE: 71 MMHG | SYSTOLIC BLOOD PRESSURE: 113 MMHG | HEIGHT: 68 IN | TEMPERATURE: 98 F | RESPIRATION RATE: 20 BRPM | HEART RATE: 140 BPM | OXYGEN SATURATION: 97 %

## 2021-06-09 DIAGNOSIS — K92.2 GASTROINTESTINAL HEMORRHAGE, UNSPECIFIED: ICD-10-CM

## 2021-06-09 DIAGNOSIS — Z87.2 PERSONAL HISTORY OF DISEASES OF THE SKIN AND SUBCUTANEOUS TISSUE: Chronic | ICD-10-CM

## 2021-06-09 LAB
ABO RH CONFIRMATION: SIGNIFICANT CHANGE UP
ALBUMIN SERPL ELPH-MCNC: 2.9 G/DL — LOW (ref 3.3–5)
ALP SERPL-CCNC: 34 U/L — SIGNIFICANT CHANGE UP (ref 30–120)
ALT FLD-CCNC: 19 U/L DA — SIGNIFICANT CHANGE UP (ref 10–60)
ANION GAP SERPL CALC-SCNC: 10 MMOL/L — SIGNIFICANT CHANGE UP (ref 5–17)
APTT BLD: 26.4 SEC — LOW (ref 27.5–35.5)
AST SERPL-CCNC: 12 U/L — SIGNIFICANT CHANGE UP (ref 10–40)
BASOPHILS # BLD AUTO: 0.04 K/UL — SIGNIFICANT CHANGE UP (ref 0–0.2)
BASOPHILS NFR BLD AUTO: 0.3 % — SIGNIFICANT CHANGE UP (ref 0–2)
BILIRUB SERPL-MCNC: 0.4 MG/DL — SIGNIFICANT CHANGE UP (ref 0.2–1.2)
BLD GP AB SCN SERPL QL: SIGNIFICANT CHANGE UP
BUN SERPL-MCNC: 42 MG/DL — HIGH (ref 7–23)
CALCIUM SERPL-MCNC: 8.5 MG/DL — SIGNIFICANT CHANGE UP (ref 8.4–10.5)
CHLORIDE SERPL-SCNC: 108 MMOL/L — SIGNIFICANT CHANGE UP (ref 96–108)
CO2 SERPL-SCNC: 22 MMOL/L — SIGNIFICANT CHANGE UP (ref 22–31)
CREAT SERPL-MCNC: 1.07 MG/DL — SIGNIFICANT CHANGE UP (ref 0.5–1.3)
EOSINOPHIL # BLD AUTO: 0 K/UL — SIGNIFICANT CHANGE UP (ref 0–0.5)
EOSINOPHIL NFR BLD AUTO: 0 % — SIGNIFICANT CHANGE UP (ref 0–6)
GLUCOSE SERPL-MCNC: 206 MG/DL — HIGH (ref 70–99)
HCT VFR BLD CALC: 25.6 % — LOW (ref 39–50)
HGB BLD-MCNC: 8.5 G/DL — LOW (ref 13–17)
IMM GRANULOCYTES NFR BLD AUTO: 0.4 % — SIGNIFICANT CHANGE UP (ref 0–1.5)
INR BLD: 1.39 RATIO — HIGH (ref 0.88–1.16)
LYMPHOCYTES # BLD AUTO: 16.2 % — SIGNIFICANT CHANGE UP (ref 13–44)
LYMPHOCYTES # BLD AUTO: 2.22 K/UL — SIGNIFICANT CHANGE UP (ref 1–3.3)
MCHC RBC-ENTMCNC: 32.7 PG — SIGNIFICANT CHANGE UP (ref 27–34)
MCHC RBC-ENTMCNC: 33.2 GM/DL — SIGNIFICANT CHANGE UP (ref 32–36)
MCV RBC AUTO: 98.5 FL — SIGNIFICANT CHANGE UP (ref 80–100)
MONOCYTES # BLD AUTO: 0.64 K/UL — SIGNIFICANT CHANGE UP (ref 0–0.9)
MONOCYTES NFR BLD AUTO: 4.7 % — SIGNIFICANT CHANGE UP (ref 2–14)
NEUTROPHILS # BLD AUTO: 10.77 K/UL — HIGH (ref 1.8–7.4)
NEUTROPHILS NFR BLD AUTO: 78.4 % — HIGH (ref 43–77)
NRBC # BLD: 0 /100 WBCS — SIGNIFICANT CHANGE UP (ref 0–0)
OB PNL STL: POSITIVE
PLATELET # BLD AUTO: 218 K/UL — SIGNIFICANT CHANGE UP (ref 150–400)
POTASSIUM SERPL-MCNC: 4 MMOL/L — SIGNIFICANT CHANGE UP (ref 3.5–5.3)
POTASSIUM SERPL-SCNC: 4 MMOL/L — SIGNIFICANT CHANGE UP (ref 3.5–5.3)
PROT SERPL-MCNC: 5.4 G/DL — LOW (ref 6–8.3)
PROTHROM AB SERPL-ACNC: 16.6 SEC — HIGH (ref 10.6–13.6)
RBC # BLD: 2.6 M/UL — LOW (ref 4.2–5.8)
RBC # FLD: 12.8 % — SIGNIFICANT CHANGE UP (ref 10.3–14.5)
SARS-COV-2 RNA SPEC QL NAA+PROBE: SIGNIFICANT CHANGE UP
SODIUM SERPL-SCNC: 140 MMOL/L — SIGNIFICANT CHANGE UP (ref 135–145)
WBC # BLD: 13.73 K/UL — HIGH (ref 3.8–10.5)
WBC # FLD AUTO: 13.73 K/UL — HIGH (ref 3.8–10.5)

## 2021-06-09 PROCEDURE — 93010 ELECTROCARDIOGRAM REPORT: CPT

## 2021-06-09 PROCEDURE — 99291 CRITICAL CARE FIRST HOUR: CPT

## 2021-06-09 PROCEDURE — 71045 X-RAY EXAM CHEST 1 VIEW: CPT | Mod: 26

## 2021-06-09 PROCEDURE — 99223 1ST HOSP IP/OBS HIGH 75: CPT

## 2021-06-09 RX ORDER — PANTOPRAZOLE SODIUM 20 MG/1
80 TABLET, DELAYED RELEASE ORAL ONCE
Refills: 0 | Status: DISCONTINUED | OUTPATIENT
Start: 2021-06-09 | End: 2021-06-09

## 2021-06-09 RX ORDER — METFORMIN HYDROCHLORIDE 850 MG/1
500 TABLET ORAL
Qty: 0 | Refills: 0 | DISCHARGE

## 2021-06-09 RX ORDER — PANTOPRAZOLE SODIUM 20 MG/1
8 TABLET, DELAYED RELEASE ORAL
Qty: 80 | Refills: 0 | Status: DISCONTINUED | OUTPATIENT
Start: 2021-06-09 | End: 2021-06-13

## 2021-06-09 RX ORDER — PANTOPRAZOLE SODIUM 20 MG/1
40 TABLET, DELAYED RELEASE ORAL
Refills: 0 | Status: DISCONTINUED | OUTPATIENT
Start: 2021-06-09 | End: 2021-06-09

## 2021-06-09 RX ORDER — DEXTROSE 50 % IN WATER 50 %
25 SYRINGE (ML) INTRAVENOUS ONCE
Refills: 0 | Status: DISCONTINUED | OUTPATIENT
Start: 2021-06-09 | End: 2021-06-14

## 2021-06-09 RX ORDER — METFORMIN HYDROCHLORIDE 850 MG/1
0 TABLET ORAL
Qty: 0 | Refills: 0 | DISCHARGE

## 2021-06-09 RX ORDER — SODIUM CHLORIDE 9 MG/ML
1000 INJECTION INTRAMUSCULAR; INTRAVENOUS; SUBCUTANEOUS ONCE
Refills: 0 | Status: COMPLETED | OUTPATIENT
Start: 2021-06-09 | End: 2021-06-09

## 2021-06-09 RX ORDER — DILTIAZEM HCL 120 MG
10 CAPSULE, EXT RELEASE 24 HR ORAL ONCE
Refills: 0 | Status: DISCONTINUED | OUTPATIENT
Start: 2021-06-09 | End: 2021-06-09

## 2021-06-09 RX ORDER — TRAMADOL HYDROCHLORIDE 50 MG/1
50 TABLET ORAL ONCE
Refills: 0 | Status: DISCONTINUED | OUTPATIENT
Start: 2021-06-09 | End: 2021-06-09

## 2021-06-09 RX ORDER — PANTOPRAZOLE SODIUM 20 MG/1
40 TABLET, DELAYED RELEASE ORAL ONCE
Refills: 0 | Status: COMPLETED | OUTPATIENT
Start: 2021-06-09 | End: 2021-06-09

## 2021-06-09 RX ORDER — ATORVASTATIN CALCIUM 80 MG/1
40 TABLET, FILM COATED ORAL
Qty: 0 | Refills: 0 | DISCHARGE

## 2021-06-09 RX ORDER — APIXABAN 2.5 MG/1
0 TABLET, FILM COATED ORAL
Qty: 0 | Refills: 0 | DISCHARGE

## 2021-06-09 RX ORDER — SODIUM CHLORIDE 9 MG/ML
1000 INJECTION, SOLUTION INTRAVENOUS
Refills: 0 | Status: DISCONTINUED | OUTPATIENT
Start: 2021-06-09 | End: 2021-06-14

## 2021-06-09 RX ORDER — INSULIN LISPRO 100/ML
VIAL (ML) SUBCUTANEOUS AT BEDTIME
Refills: 0 | Status: DISCONTINUED | OUTPATIENT
Start: 2021-06-09 | End: 2021-06-10

## 2021-06-09 RX ORDER — PROTHROMBIN COMPLEX CONCENTRATE (HUMAN) 25.5; 16.5; 24; 22; 22; 26 [IU]/ML; [IU]/ML; [IU]/ML; [IU]/ML; [IU]/ML; [IU]/ML
2000 POWDER, FOR SOLUTION INTRAVENOUS ONCE
Refills: 0 | Status: COMPLETED | OUTPATIENT
Start: 2021-06-09 | End: 2021-06-09

## 2021-06-09 RX ORDER — METOPROLOL TARTRATE 50 MG
5 TABLET ORAL ONCE
Refills: 0 | Status: COMPLETED | OUTPATIENT
Start: 2021-06-09 | End: 2021-06-09

## 2021-06-09 RX ORDER — DEXTROSE 50 % IN WATER 50 %
12.5 SYRINGE (ML) INTRAVENOUS ONCE
Refills: 0 | Status: DISCONTINUED | OUTPATIENT
Start: 2021-06-09 | End: 2021-06-14

## 2021-06-09 RX ORDER — ATORVASTATIN CALCIUM 80 MG/1
0 TABLET, FILM COATED ORAL
Qty: 0 | Refills: 0 | DISCHARGE

## 2021-06-09 RX ORDER — INSULIN LISPRO 100/ML
VIAL (ML) SUBCUTANEOUS
Refills: 0 | Status: DISCONTINUED | OUTPATIENT
Start: 2021-06-09 | End: 2021-06-10

## 2021-06-09 RX ORDER — METOPROLOL TARTRATE 50 MG
5 TABLET ORAL EVERY 6 HOURS
Refills: 0 | Status: DISCONTINUED | OUTPATIENT
Start: 2021-06-09 | End: 2021-06-10

## 2021-06-09 RX ORDER — ATORVASTATIN CALCIUM 80 MG/1
40 TABLET, FILM COATED ORAL AT BEDTIME
Refills: 0 | Status: DISCONTINUED | OUTPATIENT
Start: 2021-06-09 | End: 2021-06-14

## 2021-06-09 RX ORDER — ONDANSETRON 8 MG/1
4 TABLET, FILM COATED ORAL ONCE
Refills: 0 | Status: COMPLETED | OUTPATIENT
Start: 2021-06-09 | End: 2021-06-09

## 2021-06-09 RX ORDER — GLUCAGON INJECTION, SOLUTION 0.5 MG/.1ML
1 INJECTION, SOLUTION SUBCUTANEOUS ONCE
Refills: 0 | Status: DISCONTINUED | OUTPATIENT
Start: 2021-06-09 | End: 2021-06-14

## 2021-06-09 RX ORDER — DEXTROSE 50 % IN WATER 50 %
15 SYRINGE (ML) INTRAVENOUS ONCE
Refills: 0 | Status: DISCONTINUED | OUTPATIENT
Start: 2021-06-09 | End: 2021-06-14

## 2021-06-09 RX ADMIN — SODIUM CHLORIDE 1000 MILLILITER(S): 9 INJECTION INTRAMUSCULAR; INTRAVENOUS; SUBCUTANEOUS at 20:23

## 2021-06-09 RX ADMIN — ATORVASTATIN CALCIUM 40 MILLIGRAM(S): 80 TABLET, FILM COATED ORAL at 22:34

## 2021-06-09 RX ADMIN — Medication 5 MILLIGRAM(S): at 22:52

## 2021-06-09 RX ADMIN — SODIUM CHLORIDE 1000 MILLILITER(S): 9 INJECTION INTRAMUSCULAR; INTRAVENOUS; SUBCUTANEOUS at 19:38

## 2021-06-09 RX ADMIN — PROTHROMBIN COMPLEX CONCENTRATE (HUMAN) 400 INTERNATIONAL UNIT(S): 25.5; 16.5; 24; 22; 22; 26 POWDER, FOR SOLUTION INTRAVENOUS at 22:29

## 2021-06-09 RX ADMIN — SODIUM CHLORIDE 1000 MILLILITER(S): 9 INJECTION INTRAMUSCULAR; INTRAVENOUS; SUBCUTANEOUS at 18:46

## 2021-06-09 RX ADMIN — PANTOPRAZOLE SODIUM 10 MG/HR: 20 TABLET, DELAYED RELEASE ORAL at 22:52

## 2021-06-09 RX ADMIN — PANTOPRAZOLE SODIUM 40 MILLIGRAM(S): 20 TABLET, DELAYED RELEASE ORAL at 18:47

## 2021-06-09 RX ADMIN — SODIUM CHLORIDE 1000 MILLILITER(S): 9 INJECTION INTRAMUSCULAR; INTRAVENOUS; SUBCUTANEOUS at 19:37

## 2021-06-09 RX ADMIN — TRAMADOL HYDROCHLORIDE 50 MILLIGRAM(S): 50 TABLET ORAL at 21:42

## 2021-06-09 RX ADMIN — ONDANSETRON 4 MILLIGRAM(S): 8 TABLET, FILM COATED ORAL at 20:48

## 2021-06-09 RX ADMIN — TRAMADOL HYDROCHLORIDE 50 MILLIGRAM(S): 50 TABLET ORAL at 22:26

## 2021-06-09 NOTE — H&P ADULT - HISTORY OF PRESENT ILLNESS
71M with afib on eliquis, DM2 not on insulin, hyperlipidemia, hx of CVA with no residuals, who presents with dizziness/GI bleed.  Patient reports having dark stools starting yesterday morning and then started to have dizziness when standing.  Also complained of some light-headedness when sitting.  However no LOC or headaches.  Denies any abdominal pain.  Had an episode of nausea and one episode of non-bloody non-bilious vomiting.  No chest pain, SOB, palpitations, weakness.  No recent illnesses such as fevers, cough.  Has been having some unintentional weight loss of 10lbs in the past month.  Did not have any history of GI bleed in the past.  Had an endoscopy about 4 years ago for dysphagia but was told he had a normal EGD (no ulcers).  No hx of colonoscopy.  No known family hx of GI issues or bleeding.       In the ED, patient's triage vitals were /71   RR 20, 97%  T 97.5F.  Found to have a hgb of 8.5 (last known hgb was 14.4 in 10/2020) and leukocytosis of 13.7.  Patient's EKG showed afib with a HR of 137.  Patient was ordered and transfused 1u of pRBC.  ICU was consulted for anemia and tachycardia.  GI (Dr. Girard) was also consulted.  Patient feels stable and asymptomatic as long as he does not move.

## 2021-06-09 NOTE — ED ADULT NURSE NOTE - NS PRO PASSIVE SMOKE EXP
EVERY NIGHT AS NEEDED FOR CRAMPING, Disp-10 tablet, R-0Normal      Diabetic Shoe MISC Starting Wed 11/20/2019, Disp-1 each, R-0, PrintDISPENSE ONE PAIR OF DIABETIC SHOE AND 3 PAIRS HEAT MOLDED INSERTS      !! Lancets (ONETOUCH DELICA PLUS [id number]) MISC USE TO CHECK BLLOD SUGAR AS DIRECTED, Disp-100 each, R-1Normal      triamterene-hydrochlorothiazide (DYAZIDE) 37.5-25 MG per capsule TAKE 1 CAPSULE BY MOUTH EVERY DAY IN THE MORNING, Disp-90 capsule, R-0Normal      !! celecoxib (CELEBREX) 200 MG capsule TAKE 1 CAPSULE BY MOUTH EVERY DAY, Disp-60 capsule, R-5Normal      blood glucose monitor kit and supplies Test 1 times a day & as needed for symptoms of irregular blood glucose., Disp-1 kit, R-0, Normal      insulin glargine (BASAGLAR KWIKPEN) 100 UNIT/ML injection pen Inject 30 U Nightly, Disp-5 pen, R-5Normal      VICTOZA 18 MG/3ML SOPN SC injection ADMINISTER 1.8 MG INTO THE SKIN EVERY DAY, Disp-9 mL, R-3Normal      !! gabapentin (NEURONTIN) 100 MG capsule Take 100 mg by mouth daily. Historical Med      !! gabapentin (NEURONTIN) 300 MG capsule TK ONE C PO QHS, R-5Historical Med      HYDROcodone-acetaminophen (NORCO) 7.5-325 MG per tablet Take 1 tablet by mouth every 6 hours as needed for Pain. Kait Guallpa Historical Med      Blood Glucose Monitoring Suppl ([address]) w/Device KIT DAILY PRN Starting 8/4/2017, Until Discontinued, Disp-1 kit, R-0, Normal      !! ONE TOUCH LANCETS MISC DAILY Starting 8/4/2017, Until Discontinued, Disp-100 each, R-3, Normal      Cream Base CREA Disp-360 g, R-3, Phone InApply 1-2 pumps to affected area 3-4 times daily      ondansetron (ZOFRAN-ODT) 4 MG disintegrating tablet DISSOLVE 1 TABLET ON THE TONGUE EVERY 8 HOURS AS NEEDED FOR NAUSEA OR VOMITING, Disp-20 tablet, R-0Normal      potassium chloride (KLOR-CON M) 20 MEQ extended release tablet Take 2 tablets by mouth daily for 3 days, Disp-6 tablet, R-0Normal      metFORMIN (GLUCOPHAGE) 500 MG tablet TAKE 1 TABLET BY MOUTH TWICE DAILY within normal limits   POCT GLUCOSE   POCT GLUCOSE       All other labs were within normal range or not returned as of this dictation. EMERGENCY DEPARTMENT COURSE and DIFFERENTIALDIAGNOSIS/MDM:   Vitals:    Vitals:    06/10/20 1200   BP: (!) 156/84   Pulse: 89   Resp: 16   Temp: 98.3 °F (36.8 °C)   SpO2: 98%   Weight: (!) 305 lb (138.3 kg)   Height: 5' 1\" (1.549 m)       MDM  ED Course as of Wilner 10 1617   Wed Wilner 10, 2020   1526 Patient hyperglycemic on initial labs to 62. She ate a little better and on recheck was still in the 46s. Patient is asymptomatic from this and with normal mental status. Regarding her cellulitis, patient has failed outpatient antibiotics the course was short and the antibiotic was not the ideal choice. Discussed admission for IV antibiotics versus discharge home with change in antibiotic regimen and patient does not want to stay in the hospital.  Patient tolerated Ancef well here. Will plan for discharge on alternative cephalosporin for better coverage. [NIKKI]   6385 Patient does not take any medications that I believe would cause a dangerous hypoglycemia    [NIKKI]   1528 . Plan to recheck and if remains low we will admit to the hospitalist.    [NIKKI]      ED Course User Index  [NIKKI] Luz Sosa MD     Evaluation and work-up here revealed no signs of emergent or life-threatening pathology that would necessitate admission for further work-up or management at this time. It is felt to be stable for discharge home with return precautions for worsening of the condition or development of new concerning symptoms. Patient was encouraged to follow-up with their primary care doctor in the appropriate timeframe. Necessary prescriptions and information have been provided for treatment at home. Patient voices understanding and agreement with the plan. CONSULTS:  None    PROCEDURES:  Unless otherwise notedbelow, none     Procedures    FINAL IMPRESSION     1.  Bilateral lower leg cellulitis    2.  Type 2 diabetes mellitus with hypoglycemia without coma, without long-term current use of insulin [facility])          DISPOSITION/PLAN   DISPOSITION Decision To Discharge 06/10/2020 03:31:18 PM      PATIENT REFERRED TO:  [facility] EMERGENCY DEPT  Jack Tristan  [phone]    If symptoms worsen    Ivy Crowe, APRN  [address] [id number] [facility] [address] [facility]    In 3 days        DISCHARGE MEDICATIONS:  Discharge Medication List as of 6/10/2020  3:49 PM      START taking these medications    Details   cefdinir (OMNICEF) 300 MG capsule Take 1 capsule by mouth 2 times daily for 10 days, Disp-20 capsule, R-0Print                (Please note that portions of this note were completed with a voice recognition program.  Efforts were made to edit the dictations butoccasionally words are mis-transcribed.)    Eli Whitten MD (electronically signed)  Emergency Physician         Eli Whitten., MD  06/10/20 0954 No

## 2021-06-09 NOTE — ED ADULT NURSE REASSESSMENT NOTE - NS ED NURSE REASSESS COMMENT FT1
Blood transfusion initiated at 20:20  ,blood verified by two RN at bedside, consent in the chart, pt educated on possible blood transfusion reactions with verbalization of understanding. see flow sheet for v/s, call bell provided , pt monitored closely.

## 2021-06-09 NOTE — H&P ADULT - NSICDXFAMILYHX_GEN_ALL_CORE_FT
FAMILY HISTORY:  Father  Still living? Unknown  Family hx of lung cancer, Age at diagnosis: Age Unknown    Mother  Still living? Unknown  Family hx of lung cancer, Age at diagnosis: Age Unknown    Sibling  Still living? Unknown  Family hx of lung cancer, Age at diagnosis: Age Unknown

## 2021-06-09 NOTE — PROVIDER CONTACT NOTE (EICU) - BACKGROUND
71 year old male Afib on Eliquis, High chol, DM2  Who p/w dark tarry stool x 1 day but today felt dizy on exertion.  In ED found to have , s/p 2 L fluids but not changed. Baseline Hg 10/28/20  14.4 now 8.5  as per PA - Sinus tach not afib

## 2021-06-09 NOTE — ED PROVIDER NOTE - PROGRESS NOTE DETAILS
Scribe IN for Dr. Holcomb: 70 y/o male with PMHX of GERD, on Eliquis s/p CVA presents to the ED BIBEMS from home c/o melena since yesterday. Pt reports 2 bowel movements performed in the last 2 days, both with black stool. Pt also c/o generalized weakness and dizziness. Pt also reports paler than usual. Denies abd pain, CP, SOB, cough, fevers, chills, n/v/d. No other complaints at this time.  PHYSICAL: well-developed, well-nourished, no acute distress. Pale conjunctiva, mucous membranes. S1 S2 tachycardic. lungs CTA. abd soft, non-tender. Rectal: Non-tender, +melena. Skin: Pale, dry, no rash. Scribe IN for Dr. Holcomb: 72 y/o male with PMHX of GERD, on Eliquis s/p CVA presents to the ED BIBEMS from home c/o melena since yesterday. Pt reports 2 bowel movements performed in the last 2 days, both with black stool. Pt also c/o generalized weakness and dizziness when standing. Pt also reports his skin is paler than usual. Denies abd pain, CP, SOB, cough, fevers, chills, n/v/d. No other complaints at this time.  PHYSICAL: well-developed, well-nourished, no acute distress. PERRL, Pale conjunctiva, moist mucous membranes. S1 S2 tachycardic. lungs CTA. abd soft, non-tender. Rectal: Non-tender, +melena. Skin: Pale, dry, no rash. hgb 8.5 baseline 14 2020 2 units ordered and pt consented for blood

## 2021-06-09 NOTE — H&P ADULT - NSHPLABSRESULTS_GEN_ALL_CORE
LABS:                        8.5<L>  13.73<H> )-----------( 218      ( 09 Jun 2021 19:09 )             25.6<L>    140    |  108    |  42<H>  ----------------------------<  206<H>    09 Jun 2021 19:09  4.0     |  22     |  1.07         Ca 8.5           09 Jun 2021 19:09        TPro  5.4<L>  /  Alb  2.9<L>  /  TBili  0.4    /  DBili  x      /  AST  12     /  ALT  19     /  AlkPhos  34     09 Jun 2021 19:09    PT/INR - ( 09 Jun 2021 19:09 )   PT: 16.6<H>;   INR: 1.39<H>         PTT - ( 09 Jun 2021 19:09 )  PTT:26.4<L>    EKG:  afib with HR 137s (though looks like there is p-waves on EKG)  Radiology:  none

## 2021-06-09 NOTE — CONSULT NOTE ADULT - ASSESSMENT
Patient is a 71 year old male with a pmhx of cva, afib on eliqus, DM, hypothyroid, HLD who presents with dark tary stools x 1 day, found to have:    1. ABLA  2. GI bleed   3. sinus tach    Plan  - s/p 2units of fluid. will hold further bolus   - 1 unit PRBC now for symptomatic gi bleed and then serial cbc. if HR does not improve with blood admin will admit to MICU   - Bleed appears likely upper gi given melena and increases bun  - Protonix push and then IV drip  - NPO  - hold eliqus and chemical dvt ppx   - ISS q6   - GI consult   - case d/w Dr. Connolly and Stella  Patient is a 71 year old male with a pmhx of cva, afib on eliqus, DM, hypothyroid, HLD who presents with dark tary stools x 1 day, found to have:    1. ABLA  2. GI bleed   3. sinus tach    Plan  - s/p 2units of fluid. will hold further bolus   - 1 unit PRBC (give over 2 hours) for symptomatic gi bleed and then serial cbc. if HR does not improve with blood admin will admit to MICU   - Bleed appears likely upper gi given melena and increases bun  - Protonix push and then IV drip  - NPO  - hold eliqus and chemical dvt ppx   - ISS q6   - check orthostatics   - GI consult   - case d/w Dr. Connolly and Stella  Patient is a 71 year old male with a pmhx of cva, afib on eliqus, DM, hypothyroid, HLD who presents with dark tary stools x 1 day, found to have:    1. ABLA  2. GI bleed   3. sinus tach    Plan  - s/p 2units of fluid. will hold further bolus   - 1 unit PRBC (give over 2 hours) for symptomatic gi bleed and then serial cbc. if HR does not improve with blood admin will admit to MICU   - Bleed appears likely upper gi given melena and increases bun  - Protonix push and then IV drip  - NPO  - hold eliqus and chemical dvt ppx   - ISS q6   - check orthostatics   - GI consult   - case d/w Dr. Connolly and eICU       Addendum:  - Pt HR unchanged after initial blood admin. Remains 140s. BP stable   - Attempted orthostatic BP and patient became nausea and dizzy. which required zofran  - this is suggestive of significant volume depletion   - therefore will reverse Eliquis with Kcentra start PPI drip   - serial cbc  - admit to ICU     Critical Care time: 45 mins assessing presenting problems of acute illness that poses high probability of life threatening deterioration or end organ damage/dysfunction.  Medical decision making inculding Initiating plan of care, reviewing data, reviewing radiology,direct patient bedside evaluation and interpretation of vital signs, any necessary ventilator management , discusion with multidisciplinary team, discussing goals of care with patient/family, all non inclusive of procedures    Patient is a 71 year old male with a pmhx of cva, afib on eliqus, DM, hypothyroid, HLD who presents with dark tary stools x 1 day, found to have:    1. ABLA  2. GI bleed   3. sinus tach vs atypical aflutter     Plan  - s/p 2units of fluid. will hold further bolus   - 1 unit PRBC (give over 2 hours) for symptomatic gi bleed and then serial cbc. if HR does not improve with blood admin will admit to MICU   - Bleed appears likely upper gi given melena and increases bun  - Protonix push and then IV drip  - NPO  - hold eliqus and chemical dvt ppx   - ISS q6   - check orthostatics   - GI consult   - case d/w Dr. Connolly and eICU       Addendum:  - Pt HR unchanged after initial blood admin. Remains 140s. HR may be atypical aflutter per cardiology   - Attempted orthostatic BP and patient became nausea and dizzy. which required zofran  - may be suggestive of volume depletion vs rate related  - therefore will reverse Eliquis with Kcentra start PPI drip   - 5mg IV lopressor for aflutter   - serial cbc  - admit to ICU   - case d/w eICU     Critical Care time: 45 mins assessing presenting problems of acute illness that poses high probability of life threatening deterioration or end organ damage/dysfunction.  Medical decision making inculding Initiating plan of care, reviewing data, reviewing radiology,direct patient bedside evaluation and interpretation of vital signs, any necessary ventilator management , discusion with multidisciplinary team, discussing goals of care with patient/family, all non inclusive of procedures

## 2021-06-09 NOTE — PROVIDER CONTACT NOTE (EICU) - SITUATION
eICU Opinion Note  Patient Location  in [facility] ed  Provider Location [facility].   Case reviewed with OLEG Aguilar

## 2021-06-09 NOTE — CONSULT NOTE ADULT - SUBJECTIVE AND OBJECTIVE BOX
REASON FOR CONSULT: GI bleed    CONSULT REQUESTED BY: Dr. Forrest    Patient is a 71y old  Male who presents with a chief complaint of dizziness     BRIEF HOSPITAL COURSE:   Patient is a 71 year old male with a pmhx of cva, afib on eliqus, DM, hypothyroid, HLD who presents with dark tary stools x 1 day. Patient explains that yesterday he had bowel movement which appears "black". Then today had episode of dizziness while walking and had associated dizziness. Pt then was diaphoretic. EMS was called and patient brought to [facility]. HR noted to be 140s (sinus) upon arrival. Hbg was 8.5. Pt received 2L of fluid and MICU was then called. Pt seen and examined at bedside. Endorses unintentional 20-30 lb weight loss over past few months. Denies further melena, sob, cp, n/v.        PAST MEDICAL & SURGICAL HISTORY:  GERD (gastroesophageal reflux disease)      Allergies    No Known Allergies    Intolerances      FAMILY HISTORY:  denies     Review of Systems:  CONSTITUTIONAL: No fever, chills, + fatigue  EYES: No eye pain, visual disturbances, or discharge  ENMT:  No difficulty hearing, tinnitus, vertigo; No sinus or throat pain  NECK: No pain or stiffness  RESPIRATORY: No cough, wheezing, chills or hemoptysis; No shortness of breath  CARDIOVASCULAR: No chest pain, palpitations, dizziness, or leg swelling  GASTROINTESTINAL: No abdominal or epigastric pain. No nausea, vomiting, or hematemesis; No diarrhea or constipation. + melena no hematochezia.  GENITOURINARY: No dysuria, frequency, hematuria, or incontinence  NEUROLOGICAL: No headaches, memory loss, loss of strength, numbness, or tremors  SKIN: No itching, burning, rashes, or lesions   MUSCULOSKELETAL: No joint pain or swelling; No muscle, back, or extremity pain  PSYCHIATRIC: No depression, anxiety, mood swings, or difficulty sleeping      Medications:                    ICU Vital Signs Last 24 Hrs  T(C): 36.4 (09 Jun 2021 18:28), Max: 36.4 (09 Jun 2021 18:28)  T(F): 97.5 (09 Jun 2021 18:28), Max: 97.5 (09 Jun 2021 18:28)  HR: 140 (09 Jun 2021 19:30) (140 - 144)  BP: 124/89 (09 Jun 2021 19:30) (113/71 - 124/89)  BP(mean): --  ABP: --  ABP(mean): --  RR: 22 (09 Jun 2021 19:30) (18 - 22)  SpO2: 100% (09 Jun 2021 19:30) (97% - 100%)    Vital Signs Last 24 Hrs  T(C): 36.4 (09 Jun 2021 18:28), Max: 36.4 (09 Jun 2021 18:28)  T(F): 97.5 (09 Jun 2021 18:28), Max: 97.5 (09 Jun 2021 18:28)  HR: 140 (09 Jun 2021 19:30) (140 - 144)  BP: 124/89 (09 Jun 2021 19:30) (113/71 - 124/89)  BP(mean): --  RR: 22 (09 Jun 2021 19:30) (18 - 22)  SpO2: 100% (09 Jun 2021 19:30) (97% - 100%)        I&O's Detail        LABS:                        8.5    13.73 )-----------( 218      ( 09 Jun 2021 19:09 )             25.6     06-09    140  |  108  |  42<H>  ----------------------------<  206<H>  4.0   |  22  |  1.07    Ca    8.5      09 Jun 2021 19:09    TPro  5.4<L>  /  Alb  2.9<L>  /  TBili  0.4  /  DBili  x   /  AST  12  /  ALT  19  /  AlkPhos  34  06-09          CAPILLARY BLOOD GLUCOSE        PT/INR - ( 09 Jun 2021 19:09 )   PT: 16.6 sec;   INR: 1.39 ratio         PTT - ( 09 Jun 2021 19:09 )  PTT:26.4 sec    CULTURES:      Physical Examination:    General: Elderly male, NAD, appears pale     HEENT: Pupils equal, reactive to light.  Symmetric.    PULM: Clear to auscultation bilaterally, no significant sputum production    CVS: +s1/s2    ABD: Soft, nondistended, nontender, normoactive bowel sounds, no masses    EXT: No edema, nontender    SKIN: Warm and well perfused    Neuro: awake, alert and oriented x3    RADIOLOGY:   chest xray appears clear        REASON FOR CONSULT: GI bleed    CONSULT REQUESTED BY: Dr. Forrest    Patient is a 71y old  Male who presents with a chief complaint of dizziness     BRIEF HOSPITAL COURSE:   Patient is a 71 year old male with a pmhx of cva, afib on eliqus, DM, hypothyroid, HLD who presents with dark tary stools x 1 day. Patient explains that yesterday he had bowel movement which appears "black". Then today had episode of dizziness while walking and had associated dizziness. Pt then was diaphoretic. EMS was called and patient brought to [facility]. HR noted to be 140s upon arrival. Hbg was 8.5. Pt received 2L of fluid and MICU was then called. Pt seen and examined at bedside. Endorses unintentional 20-30 lb weight loss over past few months. Denies further melena, sob, cp, n/v.        PAST MEDICAL & SURGICAL HISTORY:  GERD (gastroesophageal reflux disease)      Allergies    No Known Allergies    Intolerances      FAMILY HISTORY:  denies     Review of Systems:  CONSTITUTIONAL: No fever, chills, + fatigue  EYES: No eye pain, visual disturbances, or discharge  ENMT:  No difficulty hearing, tinnitus, vertigo; No sinus or throat pain  NECK: No pain or stiffness  RESPIRATORY: No cough, wheezing, chills or hemoptysis; No shortness of breath  CARDIOVASCULAR: No chest pain, palpitations, dizziness, or leg swelling  GASTROINTESTINAL: No abdominal or epigastric pain. No nausea, vomiting, or hematemesis; No diarrhea or constipation. + melena no hematochezia.  GENITOURINARY: No dysuria, frequency, hematuria, or incontinence  NEUROLOGICAL: No headaches, memory loss, loss of strength, numbness, or tremors  SKIN: No itching, burning, rashes, or lesions   MUSCULOSKELETAL: No joint pain or swelling; No muscle, back, or extremity pain  PSYCHIATRIC: No depression, anxiety, mood swings, or difficulty sleeping      Medications:                    ICU Vital Signs Last 24 Hrs  T(C): 36.4 (09 Jun 2021 18:28), Max: 36.4 (09 Jun 2021 18:28)  T(F): 97.5 (09 Jun 2021 18:28), Max: 97.5 (09 Jun 2021 18:28)  HR: 140 (09 Jun 2021 19:30) (140 - 144)  BP: 124/89 (09 Jun 2021 19:30) (113/71 - 124/89)  BP(mean): --  ABP: --  ABP(mean): --  RR: 22 (09 Jun 2021 19:30) (18 - 22)  SpO2: 100% (09 Jun 2021 19:30) (97% - 100%)    Vital Signs Last 24 Hrs  T(C): 36.4 (09 Jun 2021 18:28), Max: 36.4 (09 Jun 2021 18:28)  T(F): 97.5 (09 Jun 2021 18:28), Max: 97.5 (09 Jun 2021 18:28)  HR: 140 (09 Jun 2021 19:30) (140 - 144)  BP: 124/89 (09 Jun 2021 19:30) (113/71 - 124/89)  BP(mean): --  RR: 22 (09 Jun 2021 19:30) (18 - 22)  SpO2: 100% (09 Jun 2021 19:30) (97% - 100%)        I&O's Detail        LABS:                        8.5    13.73 )-----------( 218      ( 09 Jun 2021 19:09 )             25.6     06-09    140  |  108  |  42<H>  ----------------------------<  206<H>  4.0   |  22  |  1.07    Ca    8.5      09 Jun 2021 19:09    TPro  5.4<L>  /  Alb  2.9<L>  /  TBili  0.4  /  DBili  x   /  AST  12  /  ALT  19  /  AlkPhos  34  06-09          CAPILLARY BLOOD GLUCOSE        PT/INR - ( 09 Jun 2021 19:09 )   PT: 16.6 sec;   INR: 1.39 ratio         PTT - ( 09 Jun 2021 19:09 )  PTT:26.4 sec    CULTURES:      Physical Examination:    General: Elderly male, NAD, appears pale     HEENT: Pupils equal, reactive to light.  Symmetric.    PULM: Clear to auscultation bilaterally, no significant sputum production    CVS: +s1/s2    ABD: Soft, nondistended, nontender, normoactive bowel sounds, no masses    EXT: No edema, nontender    SKIN: Warm and well perfused    Neuro: awake, alert and oriented x3    RADIOLOGY:   chest xray appears clear

## 2021-06-09 NOTE — ED ADULT NURSE REASSESSMENT NOTE - NS ED NURSE REASSESS COMMENT FT1
20:50 pt nauseous and dry heaving , Dr. Connolly at bedside, Zofran given.  21:30 nausea subsided, blood transfusion in progress, no transfusion reaction noted.   22:45 pt admitted to [facility], report given to MICHEL Sibley.

## 2021-06-09 NOTE — ED PROVIDER NOTE - OBJECTIVE STATEMENT
Pt is a 72 yo male with pmhx of CVA afib on eliquis BIBEMS for generalized weakness and lightheadedness when he stands since yesterday. Pt states he has had 2 dark BM. wife states he appears more pale then usual. Pt denies any nvd abdominal pain fever chills chest pain sob.     pcp Reyes

## 2021-06-09 NOTE — H&P ADULT - NSHPPHYSICALEXAM_GEN_ALL_CORE
PHYSICAL EXAM:  Vital Signs Last 24 Hrs  T(C): 36.4 (09 Jun 2021 18:28), Max: 36.4 (09 Jun 2021 18:28)  T(F): 97.5 (09 Jun 2021 18:28), Max: 97.5 (09 Jun 2021 18:28)  HR: 140 (09 Jun 2021 19:30) (140 - 144)  BP: 124/89 (09 Jun 2021 19:30) (113/71 - 124/89)  BP(mean): --  RR: 22 (09 Jun 2021 19:30) (18 - 22)  SpO2: 100% (09 Jun 2021 19:30) (97% - 100%)    GENERAL:     pale appearing but stable male in NAD  HEAD:     atraumatic  EYES:     EOMI, pale conjunctiva  RESPIRATORY:     clear to auscultation bilaterally, no rales or rhonchi or wheezing or rubs  CARDIOVASCULAR:     tachycardic but sounds regular, no murmurs or rubs or gallops, 2+ peripheral pulses  GASTROINTESTINAL:     soft, nontender, nondistended, no hepatosplenomegaly palpated, bowel sounds present  EXTREMITIES:     no clubbing or cyanosis or edema  MUSCULOSKELETAL:     no joint pain or swelling or deformities  NERVOUS SYSTEM:     motor strength intact with 5/5 B/L upper and lower extremities, no gross sensory deficits  SKIN:     no rashes or lesions  PSYCH:     appropriate, alert and orientated x3, good concentration

## 2021-06-09 NOTE — ED PROVIDER NOTE - NS ED MD DISPO DIVISION
Referred by Dr. Miguel Dill in the ENT department  Abner Bar, 4 year old, was seen for an audiometric evaluation. Abner Bar has a history of ear infections, and PE tubes.    The left tube is out, and the right ear is in place.    I  Puretone testing revealed essentially normal thresholds.  He responded reliably at 20 dB HL:  (right ear: 500 Hz, 1000 Hz, 4000 Hz)  (left ear: 500 Hz, 2000 Hz, 4000 Hz)    Further thresholds could not reliably be obtained.    Speech Reception Thresholds (SRTs) were obtained at  15 dB HL and 15 dB HL for the right and left ears, respectively.    The patient returned to ENT to discuss results, and his recommendations.    Amirah Mendoza, AUD       [facility]

## 2021-06-09 NOTE — H&P ADULT - ASSESSMENT
71M with afib on eliquis, DM2 not on insulin, hyperlipidemia, hx of CVA with no residuals, who presents with dizziness/GI bleed.      GI bleed/dizziness/melena - patient still symptomatic in the ED (patient sat up and became dizzy and nauseous), likely an upper GI source given dark stools and elev BUN.    - will re-assess HR after one unit of pRBC -> if HR downtrends, can be admitted to telemetry, but if not, admit to SPCU  - f/u post-transfusion CBC   - GI consulted (Dr. Girard contacted by ED)  - pantoprazole 40mg IV BID  - maintain IV access  - transfuse PRN (hgb <7 or symptomatic)  - active TS  - NPO for now (possible endoscopy in future)  - IVF after transfusion    Tachycardia/afib - unclear if it's sinus tachycardia vs rapid afib   - will wait if HR improves after transfusion -> if it does not, will try giving diltiazem 10mg IV x1  - remain on telemetry  - holding eliquis 2/2 GI bleed    Unintentional weight loss  - will consider CT C/A/P after GI bleeding has been stablized    DM2 - serum glucose of 205, stable  - keep NPO  - start low dose coverage scale with FS q6h while NPO  - check A1c  - diabetic diet when can able to take PO    HLD  - cont with atorvastatin 40mg daily    Preventive measures  - hold AC 2/2 GI bleed  - SCD for now   71M with afib on eliquis, DM2 not on insulin, hyperlipidemia, hx of CVA with no residuals, who presents with dizziness/GI bleed.      GI bleed/dizziness/melena - patient still symptomatic in the ED (patient sat up and became dizzy and nauseous), likely an upper GI source given dark stools and elev BUN.    - will re-assess HR after one unit of pRBC -> if HR downtrends, can be admitted to telemetry, but if not, admit to SPCU  - f/u post-transfusion CBC   - GI consulted (Dr. Girard contacted by ED)  - pantoprazole 40mg IV BID  - maintain IV access  - transfuse PRN (hgb <7 or symptomatic)  - active TS  - NPO for now (possible endoscopy in future)  - IVF after transfusion    Tachycardia/aflutter - unclear if it's sinus tachycardia vs rapid aflutter  - will wait if HR improves after transfusion -> if it does not, will try giving diltiazem 10mg IV x1 or metoprolol 5mg IV x1  - remain on telemetry  - holding eliquis 2/2 GI bleed    Unintentional weight loss  - will consider CT C/A/P after GI bleeding has been stablized    DM2 - serum glucose of 205, stable  - keep NPO  - start low dose coverage scale with FS q6h while NPO  - check A1c  - diabetic diet when can able to take PO    HLD  - cont with atorvastatin 40mg daily    Preventive measures  - hold AC 2/2 GI bleed  - SCD for now

## 2021-06-09 NOTE — H&P ADULT - NSHPSOCIALHISTORY_GEN_ALL_CORE
- no smoking  - drinks about 1 beer a day  - no illegal drug use  - lives with wife  - worked as an EMT
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
## 2021-06-09 NOTE — ED PROVIDER NOTE - ENMT, MLM
Airway patent, Nasal mucosa clear. Mouth with normal mucosa Throat has no vesicles, no oropharyngeal exudates and uvula is midline.

## 2021-06-09 NOTE — H&P ADULT - NSICDXPASTMEDICALHX_GEN_ALL_CORE_FT
PAST MEDICAL HISTORY:  Adhesive capsulitis of right shoulder     Cerebrovascular accident (CVA)     GERD (gastroesophageal reflux disease)     Hyperlipidemia     Type 2 diabetes mellitus

## 2021-06-10 DIAGNOSIS — K92.2 GASTROINTESTINAL HEMORRHAGE, UNSPECIFIED: ICD-10-CM

## 2021-06-10 DIAGNOSIS — D62 ACUTE POSTHEMORRHAGIC ANEMIA: ICD-10-CM

## 2021-06-10 DIAGNOSIS — I48.91 UNSPECIFIED ATRIAL FIBRILLATION: ICD-10-CM

## 2021-06-10 DIAGNOSIS — K21.9 GASTRO-ESOPHAGEAL REFLUX DISEASE WITHOUT ESOPHAGITIS: ICD-10-CM

## 2021-06-10 DIAGNOSIS — E11.9 TYPE 2 DIABETES MELLITUS WITHOUT COMPLICATIONS: ICD-10-CM

## 2021-06-10 LAB
A1C WITH ESTIMATED AVERAGE GLUCOSE RESULT: 6 % — HIGH (ref 4–5.6)
ALBUMIN SERPL ELPH-MCNC: 2.5 G/DL — LOW (ref 3.3–5)
ALP SERPL-CCNC: 25 U/L — LOW (ref 30–120)
ALT FLD-CCNC: 24 U/L DA — SIGNIFICANT CHANGE UP (ref 10–60)
ANION GAP SERPL CALC-SCNC: 10 MMOL/L — SIGNIFICANT CHANGE UP (ref 5–17)
APTT BLD: 28.7 SEC — SIGNIFICANT CHANGE UP (ref 27.5–35.5)
AST SERPL-CCNC: 9 U/L — LOW (ref 10–40)
BASOPHILS # BLD AUTO: 0.02 K/UL — SIGNIFICANT CHANGE UP (ref 0–0.2)
BASOPHILS NFR BLD AUTO: 0.2 % — SIGNIFICANT CHANGE UP (ref 0–2)
BILIRUB SERPL-MCNC: 0.5 MG/DL — SIGNIFICANT CHANGE UP (ref 0.2–1.2)
BUN SERPL-MCNC: 48 MG/DL — HIGH (ref 7–23)
CALCIUM SERPL-MCNC: 7.9 MG/DL — LOW (ref 8.4–10.5)
CHLORIDE SERPL-SCNC: 111 MMOL/L — HIGH (ref 96–108)
CO2 SERPL-SCNC: 18 MMOL/L — LOW (ref 22–31)
COVID-19 SPIKE DOMAIN AB INTERP: POSITIVE
COVID-19 SPIKE DOMAIN ANTIBODY RESULT: >250 U/ML — HIGH
CREAT SERPL-MCNC: 0.93 MG/DL — SIGNIFICANT CHANGE UP (ref 0.5–1.3)
EOSINOPHIL # BLD AUTO: 0 K/UL — SIGNIFICANT CHANGE UP (ref 0–0.5)
EOSINOPHIL NFR BLD AUTO: 0 % — SIGNIFICANT CHANGE UP (ref 0–6)
ESTIMATED AVERAGE GLUCOSE: 126 MG/DL — HIGH (ref 68–114)
GLUCOSE SERPL-MCNC: 175 MG/DL — HIGH (ref 70–99)
HCT VFR BLD CALC: 22.1 % — LOW (ref 39–50)
HCT VFR BLD CALC: 26 % — LOW (ref 39–50)
HCT VFR BLD CALC: 29.3 % — LOW (ref 39–50)
HGB BLD-MCNC: 7.2 G/DL — LOW (ref 13–17)
HGB BLD-MCNC: 9 G/DL — LOW (ref 13–17)
HGB BLD-MCNC: 9.9 G/DL — LOW (ref 13–17)
IMM GRANULOCYTES NFR BLD AUTO: 0.2 % — SIGNIFICANT CHANGE UP (ref 0–1.5)
INR BLD: 1.18 RATIO — HIGH (ref 0.88–1.16)
LYMPHOCYTES # BLD AUTO: 2.38 K/UL — SIGNIFICANT CHANGE UP (ref 1–3.3)
LYMPHOCYTES # BLD AUTO: 22.5 % — SIGNIFICANT CHANGE UP (ref 13–44)
MAGNESIUM SERPL-MCNC: 1.7 MG/DL — SIGNIFICANT CHANGE UP (ref 1.6–2.6)
MCHC RBC-ENTMCNC: 30.9 PG — SIGNIFICANT CHANGE UP (ref 27–34)
MCHC RBC-ENTMCNC: 31.1 PG — SIGNIFICANT CHANGE UP (ref 27–34)
MCHC RBC-ENTMCNC: 31.1 PG — SIGNIFICANT CHANGE UP (ref 27–34)
MCHC RBC-ENTMCNC: 32.6 GM/DL — SIGNIFICANT CHANGE UP (ref 32–36)
MCHC RBC-ENTMCNC: 33.8 GM/DL — SIGNIFICANT CHANGE UP (ref 32–36)
MCHC RBC-ENTMCNC: 34.6 GM/DL — SIGNIFICANT CHANGE UP (ref 32–36)
MCV RBC AUTO: 90 FL — SIGNIFICANT CHANGE UP (ref 80–100)
MCV RBC AUTO: 92.1 FL — SIGNIFICANT CHANGE UP (ref 80–100)
MCV RBC AUTO: 94.8 FL — SIGNIFICANT CHANGE UP (ref 80–100)
MONOCYTES # BLD AUTO: 0.83 K/UL — SIGNIFICANT CHANGE UP (ref 0–0.9)
MONOCYTES NFR BLD AUTO: 7.8 % — SIGNIFICANT CHANGE UP (ref 2–14)
NEUTROPHILS # BLD AUTO: 7.35 K/UL — SIGNIFICANT CHANGE UP (ref 1.8–7.4)
NEUTROPHILS NFR BLD AUTO: 69.3 % — SIGNIFICANT CHANGE UP (ref 43–77)
NRBC # BLD: 0 /100 WBCS — SIGNIFICANT CHANGE UP (ref 0–0)
PHOSPHATE SERPL-MCNC: 2.7 MG/DL — SIGNIFICANT CHANGE UP (ref 2.5–4.5)
PLATELET # BLD AUTO: 144 K/UL — LOW (ref 150–400)
PLATELET # BLD AUTO: 157 K/UL — SIGNIFICANT CHANGE UP (ref 150–400)
PLATELET # BLD AUTO: 163 K/UL — SIGNIFICANT CHANGE UP (ref 150–400)
POTASSIUM SERPL-MCNC: 4.6 MMOL/L — SIGNIFICANT CHANGE UP (ref 3.5–5.3)
POTASSIUM SERPL-SCNC: 4.6 MMOL/L — SIGNIFICANT CHANGE UP (ref 3.5–5.3)
PROT SERPL-MCNC: 4.5 G/DL — LOW (ref 6–8.3)
PROTHROM AB SERPL-ACNC: 14.2 SEC — HIGH (ref 10.6–13.6)
RBC # BLD: 2.33 M/UL — LOW (ref 4.2–5.8)
RBC # BLD: 2.89 M/UL — LOW (ref 4.2–5.8)
RBC # BLD: 3.18 M/UL — LOW (ref 4.2–5.8)
RBC # FLD: 14.7 % — HIGH (ref 10.3–14.5)
RBC # FLD: 15.4 % — HIGH (ref 10.3–14.5)
RBC # FLD: 15.9 % — HIGH (ref 10.3–14.5)
SARS-COV-2 IGG+IGM SERPL QL IA: >250 U/ML — HIGH
SARS-COV-2 IGG+IGM SERPL QL IA: POSITIVE
SODIUM SERPL-SCNC: 139 MMOL/L — SIGNIFICANT CHANGE UP (ref 135–145)
TROPONIN I SERPL-MCNC: 0.04 NG/ML — SIGNIFICANT CHANGE UP (ref 0.02–0.06)
WBC # BLD: 10.6 K/UL — HIGH (ref 3.8–10.5)
WBC # BLD: 12.46 K/UL — HIGH (ref 3.8–10.5)
WBC # BLD: 13.09 K/UL — HIGH (ref 3.8–10.5)
WBC # FLD AUTO: 10.6 K/UL — HIGH (ref 3.8–10.5)
WBC # FLD AUTO: 12.46 K/UL — HIGH (ref 3.8–10.5)
WBC # FLD AUTO: 13.09 K/UL — HIGH (ref 3.8–10.5)

## 2021-06-10 PROCEDURE — 99233 SBSQ HOSP IP/OBS HIGH 50: CPT

## 2021-06-10 RX ORDER — KETOROLAC TROMETHAMINE 30 MG/ML
15 SYRINGE (ML) INJECTION ONCE
Refills: 0 | Status: DISCONTINUED | OUTPATIENT
Start: 2021-06-10 | End: 2021-06-10

## 2021-06-10 RX ORDER — SODIUM CHLORIDE 9 MG/ML
1000 INJECTION, SOLUTION INTRAVENOUS
Refills: 0 | Status: DISCONTINUED | OUTPATIENT
Start: 2021-06-10 | End: 2021-06-11

## 2021-06-10 RX ORDER — SODIUM CHLORIDE 9 MG/ML
1000 INJECTION, SOLUTION INTRAVENOUS
Refills: 0 | Status: DISCONTINUED | OUTPATIENT
Start: 2021-06-10 | End: 2021-06-10

## 2021-06-10 RX ORDER — HYDROMORPHONE HYDROCHLORIDE 2 MG/ML
0.5 INJECTION INTRAMUSCULAR; INTRAVENOUS; SUBCUTANEOUS
Refills: 0 | Status: DISCONTINUED | OUTPATIENT
Start: 2021-06-10 | End: 2021-06-10

## 2021-06-10 RX ORDER — METOPROLOL TARTRATE 50 MG
5 TABLET ORAL EVERY 6 HOURS
Refills: 0 | Status: DISCONTINUED | OUTPATIENT
Start: 2021-06-10 | End: 2021-06-12

## 2021-06-10 RX ORDER — INSULIN LISPRO 100/ML
VIAL (ML) SUBCUTANEOUS EVERY 6 HOURS
Refills: 0 | Status: DISCONTINUED | OUTPATIENT
Start: 2021-06-10 | End: 2021-06-12

## 2021-06-10 RX ORDER — MAGNESIUM SULFATE 500 MG/ML
2 VIAL (ML) INJECTION ONCE
Refills: 0 | Status: COMPLETED | OUTPATIENT
Start: 2021-06-10 | End: 2021-06-10

## 2021-06-10 RX ORDER — DIPHENHYDRAMINE HCL 50 MG
25 CAPSULE ORAL ONCE
Refills: 0 | Status: COMPLETED | OUTPATIENT
Start: 2021-06-10 | End: 2021-06-10

## 2021-06-10 RX ADMIN — Medication 50 GRAM(S): at 07:02

## 2021-06-10 RX ADMIN — Medication 5 MILLIGRAM(S): at 02:40

## 2021-06-10 RX ADMIN — PANTOPRAZOLE SODIUM 10 MG/HR: 20 TABLET, DELAYED RELEASE ORAL at 07:44

## 2021-06-10 RX ADMIN — SODIUM CHLORIDE 75 MILLILITER(S): 9 INJECTION, SOLUTION INTRAVENOUS at 18:32

## 2021-06-10 RX ADMIN — Medication 1: at 22:53

## 2021-06-10 RX ADMIN — SODIUM CHLORIDE 75 MILLILITER(S): 9 INJECTION, SOLUTION INTRAVENOUS at 12:30

## 2021-06-10 RX ADMIN — Medication 5 MILLIGRAM(S): at 13:22

## 2021-06-10 RX ADMIN — PANTOPRAZOLE SODIUM 10 MG/HR: 20 TABLET, DELAYED RELEASE ORAL at 07:26

## 2021-06-10 RX ADMIN — Medication 1: at 18:04

## 2021-06-10 RX ADMIN — Medication 5 MILLIGRAM(S): at 18:04

## 2021-06-10 RX ADMIN — Medication 1: at 07:42

## 2021-06-10 RX ADMIN — Medication 15 MILLIGRAM(S): at 21:12

## 2021-06-10 RX ADMIN — Medication 25 MILLIGRAM(S): at 20:53

## 2021-06-10 RX ADMIN — Medication 15 MILLIGRAM(S): at 21:25

## 2021-06-10 NOTE — PROGRESS NOTE ADULT - ASSESSMENT
70 y/o M w/ a PMHx of CVA, a fib (on eliquis), DM type 2, HLD, and hypothyroidism admitted w/:    1. Upper GI bleed s/p endoscopy  2. Acute blood loss anemia  3. Rapid a flutter  4. Metabolic acidosis    PLAN:  - Mental status intact.  - Administered tordal 15mg IV for right shoulder pain.  - Heart rate is controlled. Continue w/ lopressor 5mg IV q6 hrs until able to take PO meds. Cardiology following.  - PPI gtt.  - NPO. Advance diet based on GI recs.  - Switch IV fluids to LR, will administer at 75cc/hr while NPO.  - Renal function within normal limits. Monitor urine output. Replace electrolytes as needed.  - Insulin s/s.  - S/p 3u PRBCs since yesterday evening. Repeat hgb stable. Trend CBC q12 hrs. Transfuse for hgb < 7.  - Hold anticoagulation.  - Mechanical DVT prophylaxis w/ SCDs.    Dispo: Full code.

## 2021-06-10 NOTE — CONSULT NOTE ADULT - SUBJECTIVE AND OBJECTIVE BOX
episode of dizziness ataxia feeling foggy unable to focus most likely cerebral hypoperfusion-- hypotension mimicking TIA like symptoms from volume depletion possible GI Bleed    telemetry evaluation to rule out underlying arrhythmia.  GI work up  neurologic wise stable any procedure need if possible keep sbp above 100 preferable 120

## 2021-06-10 NOTE — PROGRESS NOTE ADULT - SUBJECTIVE AND OBJECTIVE BOX
Patient is a 70 y/o male who presents with a chief complaint of GI bleed/dizziness (10 Juan 2021 09:54)    BRIEF HOSPITAL COURSE: 70 y/o M w/ a PMHx of CVA, a fib (on eliquis), DM type 2, HLD, and hypothyroidism who was admitted on 6/9 w/ acute blood loss anemia 2/2 upper GI bleed. Hospital course complicated by rapid atypical a flutter.    Events last 24 hours: S/p endoscopy w/ Dr. Medrano which was revealing of multiple actively bleeding duodenal ulcers, treated w/ cauterization. Pt seen and examined at the bedside. C/o right shoulder pain (chronic 2/2 frozen shoulder). Remains on PPI gtt. Hgb post procedure was stable.    PAST MEDICAL & SURGICAL HISTORY:  GERD (gastroesophageal reflux disease)  Type 2 diabetes mellitus  Hyperlipidemia  Cerebrovascular accident (CVA)  Adhesive capsulitis of right shoulder  H/O pilonidal cyst    Review of Systems:  CONSTITUTIONAL: No fever, chills, or fatigue.  EYES: No eye pain, visual disturbances, or discharge.  ENMT:  No difficulty hearing, tinnitus, or vertigo. No sinus or throat pain.  NECK: No pain or stiffness.  RESPIRATORY: No shortness of breath, cough, or wheezing.  CARDIOVASCULAR: No chest pain, palpitations, dizziness, or leg swelling.  GASTROINTESTINAL: No abdominal or epigastric pain. No nausea, vomiting, diarrhea, or constipation. No hematemesis, melena, or hematochezia.  GENITOURINARY: No dysuria, increased frequency, hematuria, or incontinence.  NEUROLOGICAL: No headaches, memory loss, loss of strength, numbness, or tremors.  SKIN: No itching, burning, rashes, or lesions.  MUSCULOSKELETAL: + right shoulder pain. No joint pain or swelling. No muscle, back, or extremity pain.  PSYCHIATRIC: No depression, anxiety, mood swings, or difficulty sleeping.    Medications:  metoprolol tartrate Injectable 5 milliGRAM(s) IV Push every 6 hours  pantoprazole Infusion 8 mG/Hr IV Continuous <Continuous>  atorvastatin 40 milliGRAM(s) Oral at bedtime  dextrose 40% Gel 15 Gram(s) Oral once  dextrose 50% Injectable 25 Gram(s) IV Push once  dextrose 50% Injectable 12.5 Gram(s) IV Push once  dextrose 50% Injectable 25 Gram(s) IV Push once  glucagon  Injectable 1 milliGRAM(s) IntraMuscular once  insulin lispro (ADMELOG) corrective regimen sliding scale   SubCutaneous every 6 hours  dextrose 5% + lactated ringers. 1000 milliLiter(s) IV Continuous <Continuous>  dextrose 5%. 1000 milliLiter(s) IV Continuous <Continuous>  dextrose 5%. 1000 milliLiter(s) IV Continuous <Continuous>    ICU Vital Signs Last 24 Hrs  T(C): 36.8 (10 Juan 2021 21:23), Max: 37.2 (10 Juan 2021 08:11)  T(F): 98.3 (10 Juan 2021 21:23), Max: 98.9 (10 Juan 2021 08:11)  HR: 88 (10 Juan 2021 20:00) (84 - 130)  BP: 110/62 (10 Juan 2021 20:00) (65/53 - 164/68)  BP(mean): 77 (10 Juan 2021 20:00) (59 - 104)  ABP: --  ABP(mean): --  RR: 18 (10 Juan 2021 20:00) (11 - 32)  SpO2: 100% (10 Juan 2021 20:00) (95% - 100%)    I&O's Detail    09 Jun 2021 07:01  -  10 Juan 2021 07:00  --------------------------------------------------------  IN:    IV PiggyBack: 786 mL    Pantoprazole: 90 mL  Total IN: 876 mL    OUT:    Voided (mL): 800 mL  Total OUT: 800 mL    Total NET: 76 mL    10 Juan 2021 07:01  -  10 Juan 2021 22:54  --------------------------------------------------------  IN:    dextrose 5% + lactated ringers: 300 mL    Lactated Ringers: 550 mL    Pantoprazole: 130 mL  Total IN: 980 mL    OUT:    Voided (mL): 1530 mL  Total OUT: 1530 mL    Total NET: -550 mL    LABS:                        9.0    13.09 )-----------( 157      ( 10 Juan 2021 18:35 )             26.0     06-10    139  |  111<H>  |  48<H>  ----------------------------<  175<H>  4.6   |  18<L>  |  0.93    Ca    7.9<L>      10 Juan 2021 04:27  Phos  2.7     06-10  Mg     1.7     06-10    TPro  4.5<L>  /  Alb  2.5<L>  /  TBili  0.5  /  DBili  x   /  AST  9<L>  /  ALT  24  /  AlkPhos  25<L>  06-10    CARDIAC MARKERS ( 10 Juan 2021 04:33 )  .035 ng/mL / x     / x     / x     / x        CAPILLARY BLOOD GLUCOSE    POCT Blood Glucose.: 163 mg/dL (10 Juan 2021 22:50)    PT/INR - ( 10 Juan 2021 04:27 )   PT: 14.2 sec;   INR: 1.18 ratio    PTT - ( 10 Juan 2021 04:27 )  PTT:28.7 sec    CULTURES:    Physical Examination:    General: Middle-aged male, lying comfortably in bed.    HEENT: Pupils equal, reactive to light. Symmetric. No scleral icterus or injection.    PULM: Clear to auscultation b/l.    NECK: Supple, no lymphadenopathy, trachea midline.    CVS: Irregularly irregular.    ABD: Soft, nondistended, nontender, normoactive bowel sounds.    EXT: No edema, nontender.    SKIN: Warm and well perfused, no rashes noted.    NEURO: Alert, oriented, interactive, nonfocal.    RADIOLOGY:  < from: Xray Chest 1 View- PORTABLE-Routine (Xray Chest 1 View- PORTABLE-Routine .) (06.09.21 @ 19:36) >  EXAM:  XR CHEST PORTABLE ROUTINE 1V                          PROCEDURE DATE:  06/09/2021      INTERPRETATION:  Portable chest radiograph    CLINICAL INFORMATION: Weakness    TECHNIQUE:  Portable  AP view of the chest was obtained.    COMPARISON: No previous examinations are available for review.    FINDINGS:    The lungs are clear of airspace consolidations or effusions. No pneumothorax.    The heart and mediastinum size and configuration are within normal limits.    Visualized osseous structures are intact.    IMPRESSION:   No evidence of active chest disease.    ELYSSA NUNES MD; Attending Radiologist  This document has been electronically signed. Juan 10 2021  1:03PM    < end of copied text >

## 2021-06-10 NOTE — DIETITIAN INITIAL EVALUATION ADULT. - OTHER INFO
Initial assessment: 70 yo M with afib on eliquis, DM2 not on insulin, hyperlipidemia, hx of CVA with no residuals, who presents with dizziness/GI bleed.  Patient reports having dark stools starting yesterday morning and then started to have dizziness when standing.  Also complained of some light-headedness when sitting.  However no LOC or headaches.  Denies any abdominal pain.  Had an episode of nausea and one episode of non-bloody non-bilious vomiting.  No chest pain, SOB, palpitations, weakness.  No recent illnesses such as fevers, cough.  Has been having some unintentional weight loss of 10 lbs (5%) in the past month.  Did not have any history of GI bleed in the past.  Had an endoscopy about 4 years ago for dysphagia but was told he had a normal EGD (no ulcers).  No hx of colonoscopy.  No known family hx of GI issues or bleeding. s/p 3 unit pRBC with inadequate response to pRBC transfusion, differential dx includes PUSD, UGI malignancy, AVMs. NPO with IVF hydration. Plan for urgent EGD today noted. Pt denied n/v/d. NKA to food. Denies difficulty chewing or swallowing. Diet recall obtained: breakfast: cereal, OJ, V8 + vitamins, Lunch: tea + banana (usually skips this meal), dinner: steak or shrimp or some othe protein (wife cooks dinner) potatoes (starch) vegetables + 1 beer. Pt reports decreased po intake and appetite in the past 3-4 weeks. Wt loss 11 % x 6 months noted (due to stress and decreased appetite). At risk for malnutrition if does not meet > 50 % of estimated energy requirement for >= 5 days.

## 2021-06-10 NOTE — CONSULT NOTE ADULT - ASSESSMENT
The patient is a 71 year old male with a history of HL, DM, CVA, atrial fibrillation who is admitted with GI bleed, rapid atrial flutter.    Plan:  - ECG with atypical atrial flutter with variable block  - Remains tachycardic to 120-130s  - Tachycardia in part due to ongoing anemia from GI bleed  - Continue transfusions  - Monitor hemoglobin  - Start metoprolol tartrate 5 mg IV q6h with holding parameters  - Will transition to oral rate controlling agents when able  - If BP trends down, will start digoxin  - Hold apixaban - given prior history of CVA, will eventually need to be restarted vs. consideration of Watchman  - Received PCC for reversal  - GI eval  - Plan may include EGD - this will be urgent and should proceed regardless of status of atrial flutter and rates   The patient is a 71 year old male with a history of HL, DM, CVA, atrial fibrillation who is admitted with GI bleed, rapid atrial flutter.    Plan:  - ECG with atypical atrial flutter with variable block  - Remains tachycardic to 120-130s  - Tachycardia in part due to ongoing anemia from GI bleed  - Continue transfusions  - Monitor hemoglobin  - Start metoprolol tartrate 5 mg IV q6h with holding parameters  - Will transition to oral rate controlling agents when able  - If BP trends down, will start digoxin  - Hold apixaban - given prior history of CVA, will eventually need to be restarted vs. consideration of Watchman  - Received PCC for reversal  - GI eval  - Plan may include EGD - this will be urgent and should proceed regardless of status of atrial flutter and rates    35 minutes of critical care time spent with the patient and coordinating care with nursing, hospitalist, and consultants. Patient is critically ill requiring ICU care. The patient is high risk for deterioration and death.

## 2021-06-10 NOTE — CONSULT NOTE ADULT - SUBJECTIVE AND OBJECTIVE BOX
History of Present Illness: The patient is a 71 year old male with a history of HL, DM, CVA, atrial fibrillation who presents with dizziness and melena. He states over the past couple of days he noted black stools. Yesterday he became very dizzy and lightheaded and had difficulty standing. There was some shortness of breath but no palpitations or chest pain. In ED, he was noted to be in rapid atrial flutter, anemic, requiring transfusions.    Past Medical/Surgical History:  HL, DM, CVA, atrial fibrillation    Medications:  Home Medications:  atorvastatin: 40 milligram(s) orally once a day (09 Jun 2021 20:51)  Eliquis: 5 milligram(s) orally 2 times a day (09 Jun 2021 20:50)  metFORMIN: 500 milligram(s) orally 2 times a day (09 Jun 2021 20:51)      Family History: Non-contributory family history of premature cardiovascular atherosclerotic disease    Social History: No tobacco, alcohol or drug use    Review of Systems:  General: No fevers, chills, weight loss or gain  Skin: No rashes, color changes  Cardiovascular: No chest pain, orthopnea  Respiratory: No shortness of breath, cough  Gastrointestinal: No nausea, abdominal pain  Genitourinary: No incontinence, pain with urination  Musculoskeletal: No pain, swelling, decreased range of motion  Neurological: No headache, weakness  Psychiatric: No depression, anxiety  Endocrine: No weight loss or gain, increased thirst  All other systems are comprehensively negative.    Physical Exam:  Vitals:        Vital Signs Last 24 Hrs  T(C): 37.2 (10 Juan 2021 08:11), Max: 37.2 (10 Juan 2021 08:11)  T(F): 98.9 (10 Juan 2021 08:11), Max: 98.9 (10 Juan 2021 08:11)  HR: 126 (10 Juan 2021 07:00) (106 - 145)  BP: 118/74 (10 Juan 2021 07:00) (65/53 - 128/87)  BP(mean): 89 (10 Juan 2021 07:00) (59 - 100)  RR: 22 (10 Juan 2021 07:00) (11 - 32)  SpO2: 100% (10 Juan 2021 07:00) (97% - 100%)  General: NAD  HEENT: MMM  Neck: No JVD, no carotid bruit  Lungs: CTAB  CV: RRR, nl S1/S2, no M/R/G  Abdomen: S/NT/ND, +BS  Extremities: No LE edema, no cyanosis  Neuro: AAOx3, non-focal  Skin: No rash    Labs:                        7.2    10.60 )-----------( 163      ( 10 Juan 2021 04:27 )             22.1     06-10    139  |  111<H>  |  48<H>  ----------------------------<  175<H>  4.6   |  18<L>  |  0.93    Ca    7.9<L>      10 Juan 2021 04:27  Phos  2.7     06-10  Mg     1.7     06-10    TPro  4.5<L>  /  Alb  2.5<L>  /  TBili  0.5  /  DBili  x   /  AST  9<L>  /  ALT  24  /  AlkPhos  25<L>  06-10    CARDIAC MARKERS ( 10 Juan 2021 04:33 )  .035 ng/mL / x     / x     / x     / x          PT/INR - ( 10 Juan 2021 04:27 )   PT: 14.2 sec;   INR: 1.18 ratio         PTT - ( 10 Juan 2021 04:27 )  PTT:28.7 sec    ECG: Atrial flutter, variable block

## 2021-06-10 NOTE — PROGRESS NOTE ADULT - ASSESSMENT
71M with afib on eliquis, DM2 not on insulin, hyperlipidemia, hx of CVA with no residuals, who presents with dizziness/GI bleed.      Severe Acute blood loss anemia likely due to UGI bleed/melena   -patient continues to be symptomatic   -s/p 2 units of PRBC with poor response  -s/p Kcentra  -GI /critical care consulted  -NPO  -Hb this am 7  -s/p pantoprazole 40mg IV BID changed to protonix drip  plan:  tranfuse one more unit of PRBC  CTA now  maintain NPO  maintain IV access  transfuse PRN (hgb <7 or symptomatic)  active TS    Dizziness/Nausea secondary to Upper GI Bleed  Neurology consulted :low suspicion for CVA    Tachycardia/aflutter -  likely rapid aflutter due to abrupt hypovolemia  Cardiology consulted  s/p diltizem changed to lopressor IV  plan  c/w lopressor  c/w tele    Unintentional weight loss  - consider CT C/A/P after GI bleeding has been stablized    DM2   - keep NPO  - start low dose coverage scale with FS q6h while NPO  - check A1c  - diabetic diet when can able to take PO    HLD  - cont with atorvastatin 40mg daily    Preventive measures  - hold AC 2/2 GI bleed

## 2021-06-10 NOTE — BRIEF OPERATIVE NOTE - NSICDXBRIEFPROCEDURE_GEN_ALL_CORE_FT
PROCEDURES:  UGI endoscopy 10-Juan-2021 12:22:08 see full operative report for detail  1. Active bleeding seen in the duodenal sweep with multiple duodenal ulcers, s/p APC and Bipolar cautery with good control of bleeding by the end of the procedure  2. gastritis  .  Rec:  1. NPO  2. PPI gtt  3. hold anti-plt agent  4. CBC q12 until stable  5. transfuse if Hgb <7   Miladys Medrano

## 2021-06-10 NOTE — CONSULT NOTE ADULT - SUBJECTIVE AND OBJECTIVE BOX
[facility]    MD Deric An MD Jaydeep Kadam, MD Faisal Sheikh, DO Jj Giarrd, OLEG Perry      Chief Complaint:  Patient is a 71y old  Male who presents with a chief complaint of GI bleed/dizziness (10 Juan 2021 07:58)      HPI:    71M with afib on eliquis, DM2 not on insulin, hyperlipidemia, hx of CVA with no residuals, who presents with dizziness/GI bleed.  Patient reports having dark stools starting yesterday morning and then started to have dizziness when standing.  Also complained of some light-headedness when sitting.  However no LOC or headaches.  Denies any abdominal pain.  Had an episode of nausea and one episode of non-bloody non-bilious vomiting.  No chest pain, SOB, palpitations, weakness.  No recent illnesses such as fevers, cough.  Has been having some unintentional weight loss of 10lbs in the past month.  Did not have any history of GI bleed in the past.      2 weeks ago, had one episode of melena, but attributed it to eating beets.  Last EGD 4 years ago with Dr Bonds for globus  no prior colonoscopy  No history of UGIB    Allergies:  No Known Allergies      Home Medications:    Hospital Medications:  atorvastatin 40 milliGRAM(s) Oral at bedtime  dextrose 40% Gel 15 Gram(s) Oral once  dextrose 5%. 1000 milliLiter(s) IV Continuous <Continuous>  dextrose 5%. 1000 milliLiter(s) IV Continuous <Continuous>  dextrose 50% Injectable 25 Gram(s) IV Push once  dextrose 50% Injectable 12.5 Gram(s) IV Push once  dextrose 50% Injectable 25 Gram(s) IV Push once  glucagon  Injectable 1 milliGRAM(s) IntraMuscular once  insulin lispro (ADMELOG) corrective regimen sliding scale   SubCutaneous three times a day before meals  insulin lispro (ADMELOG) corrective regimen sliding scale   SubCutaneous at bedtime  metoprolol tartrate Injectable 5 milliGRAM(s) IV Push every 6 hours  pantoprazole Infusion 8 mG/Hr IV Continuous <Continuous>      PMHX/PSHX:  GERD (gastroesophageal reflux disease)    Type 2 diabetes mellitus    Hyperlipidemia    Cerebrovascular accident (CVA)    Adhesive capsulitis of right shoulder    H/O pilonidal cyst        Family history:  Family hx of lung cancer (Father, Mother, Sibling)        Social History:     ROS:     General:  No wt loss, fevers, chills, night sweats, fatigue,   Eyes:  Good vision, no reported pain  ENT:  No sore throat, pain, runny nose, dysphagia  CV:  No pain, palpitations, hypo/hypertension  Resp:  No dyspnea, cough, tachypnea, wheezing  GI:  See HPI  :  No pain, bleeding, incontinence, nocturia  Muscle:  No pain, weakness  Neuro:  No weakness, tingling, memory problems  Psych:  No fatigue, insomnia, mood problems, depression  Endocrine:  No polyuria, polydipsia, cold/heat intolerance  Heme:  No petechiae, ecchymosis, easy bruisability  Skin:  No rash, tattoos, scars, edema      PHYSICAL EXAM:     GENERAL:  Appears stated age, well-groomed, well-nourished, no distress  HEENT:  NC/AT,  conjunctivae clear and pink, no thyromegaly, nodules, adenopathy, no JVD, sclera -anicteric  CHEST:  Full & symmetric excursion, no increased effort, breath sounds clear  HEART:  Regular rhythm, S1, S2, no murmur/rub/S3/S4, no abdominal bruit, no edema  ABDOMEN:  Soft, non-tender, non-distended, normoactive bowel sounds  EXT:  no cyanosis, clubbing or edema  SKIN:  No rash/erythema, intact   NEURO:  Alert, oriented, no asterixis, no tremor, no encephalopathy    Vital Signs:  Vital Signs Last 24 Hrs  T(C): 36.9 (10 Juan 2021 09:34), Max: 37.2 (10 Juan 2021 08:11)  T(F): 98.4 (10 Juan 2021 09:34), Max: 98.9 (10 Juan 2021 08:11)  HR: 115 (10 Juan 2021 09:34) (97 - 145)  BP: 123/67 (10 Juan 2021 09:34) (65/53 - 128/87)  BP(mean): 84 (10 Juan 2021 09:34) (59 - 100)  RR: 21 (10 Juan 2021 09:34) (11 - 32)  SpO2: 99% (10 Juan 2021 09:34) (97% - 100%)  Daily Height in cm: 172.72 (2021 18:28)    Daily Weight in k.9 (10 Juan 2021 04:00)    LABS:                        7.2    10.60 )-----------( 163      ( 10 Juan 2021 04:27 )             22.1     Mean Cell Volume: 94.8 fl (06-10- @ 04:27)    06-10    139  |  111<H>  |  48<H>  ----------------------------<  175<H>  4.6   |  18<L>  |  0.93    Ca    7.9<L>      10 Juan 2021 04:27  Phos  2.7     06-10  Mg     1.7     06-10    TPro  4.5<L>  /  Alb  2.5<L>  /  TBili  0.5  /  DBili  x   /  AST  9<L>  /  ALT  24  /  AlkPhos  25<L>  06-10    LIVER FUNCTIONS - ( 10 Juan 2021 04:27 )  Alb: 2.5 g/dL / Pro: 4.5 g/dL / ALK PHOS: 25 U/L / ALT: 24 U/L DA / AST: 9 U/L / GGT: x           PT/INR - ( 10 Juan 2021 04:27 )   PT: 14.2 sec;   INR: 1.18 ratio         PTT - ( 10 Juan 2021 04:27 )  PTT:28.7 sec                            7.2    10.60 )-----------( 163      ( 10 Juan 2021 04:27 )             22.1                         8.5    13.73 )-----------( 218      ( 2021 19:09 )             25.6       Imaging:

## 2021-06-10 NOTE — PROGRESS NOTE ADULT - SUBJECTIVE AND OBJECTIVE BOX
Patient seen and examined at bedside.  Patient in [facility],   patient denies fever chills  Blood pressure, intermittment periods of tachycardia on tele  Seen by Neuro/CC/Cardiology  s/p kcentra, s/p 2 units of prbc prior  NPO as of now. Eliquis on hold  Hb this am at 7.2 receiving one more unit of pRBC    Review of Systems:  General:denies fever chills, headache, weakness  HEENT: denies blurry vision,diffculty swallowing, difficulty hearing, tinnitus  Cardiovascular: denies chest pain  ,palpitations  Pulmonary:denies shortness of breath, cough, wheezing, hemoptysis  Gastrointestinal: denies abdominal pain, constipation, diarrhea,nausea , vomiting, hematochezia  : denies hematuria, dysuria, or incontinence  Neurological: denies weakness, numbness , tingling, dizziness, tremors  MSK: denies muscle pain, difficulty ambulating, swelling, back pain  skin: denies skin rash, itching, burning, or  skin lesions  Psychiatrical: denies mood disturbances, anxierty, feeling depressed, depression , or difficulty sleeping    Objective:  Vitals  T(C): 37.1 (06-10-21 @ 06:47), Max: 37.1 (06-10-21 @ 06:47)  HR: 126 (06-10-21 @ 07:00) (106 - 145)  BP: 118/74 (06-10-21 @ 07:00) (65/53 - 128/87)  RR: 22 (06-10-21 @ 07:00) (11 - 32)  SpO2: 100% (06-10-21 @ 07:00) (97% - 100%)    Physical Exam:  General: comfortable, no acute distress, well nourished  HEENT: Atraumatic, no LAD, trachea midline, PERRLA  Cardiovascular: normal s1s2, no murmurs, gallops or fricition rubs  Pulmonary: clear to ausculation Bilaterally, no wheezing , rhonchi  Gastrointestinal: soft non tender non distended, no masses felt, no organomegally  Muscloskeletal: no lower extremity edema, intact bilateral lower extremity pulses  Neurological: CN II-12 intact. No focal weakness  Psychiatrical: normal mood, cooperative  SKIN: no rash, lesions or ulcers    Labs:                          7.2    10.60 )-----------( 163      ( 10 Juan 2021 04:27 )             22.1     06-10    139  |  111<H>  |  48<H>  ----------------------------<  175<H>  4.6   |  18<L>  |  0.93    Ca    7.9<L>      10 Juan 2021 04:27  Phos  2.7     06-10  Mg     1.7     06-10    TPro  4.5<L>  /  Alb  2.5<L>  /  TBili  0.5  /  DBili  x   /  AST  9<L>  /  ALT  24  /  AlkPhos  25<L>  06-10    LIVER FUNCTIONS - ( 10 Juan 2021 04:27 )  Alb: 2.5 g/dL / Pro: 4.5 g/dL / ALK PHOS: 25 U/L / ALT: 24 U/L DA / AST: 9 U/L / GGT: x           PT/INR - ( 10 Juan 2021 04:27 )   PT: 14.2 sec;   INR: 1.18 ratio         PTT - ( 10 Juan 2021 04:27 )  PTT:28.7 sec      Active Medications  MEDICATIONS  (STANDING):  atorvastatin 40 milliGRAM(s) Oral at bedtime  dextrose 40% Gel 15 Gram(s) Oral once  dextrose 5%. 1000 milliLiter(s) (50 mL/Hr) IV Continuous <Continuous>  dextrose 5%. 1000 milliLiter(s) (100 mL/Hr) IV Continuous <Continuous>  dextrose 50% Injectable 25 Gram(s) IV Push once  dextrose 50% Injectable 12.5 Gram(s) IV Push once  dextrose 50% Injectable 25 Gram(s) IV Push once  glucagon  Injectable 1 milliGRAM(s) IntraMuscular once  insulin lispro (ADMELOG) corrective regimen sliding scale   SubCutaneous three times a day before meals  insulin lispro (ADMELOG) corrective regimen sliding scale   SubCutaneous at bedtime  pantoprazole Infusion 8 mG/Hr (10 mL/Hr) IV Continuous <Continuous>    MEDICATIONS  (PRN):  metoprolol tartrate Injectable 5 milliGRAM(s) IV Push every 6 hours PRN Sustained HR greater than 120

## 2021-06-10 NOTE — CONSULT NOTE ADULT - ASSESSMENT
71M Afib on Eliquis with melena x 2 days, s/p 3 unit pRBC with inadequate response to pRBC transfusion, differential dx includes PUSD, UGI malignancy, AVMs, etc.    1. NPO  2. IVF hydration  3. Transfuse for Hgb <8  4. PPI gtt @8mg/hr  5. Plan for urgent EGD today    Miladys Merdano MD  Gastroenterology  [place] [id number] [phone]    ACP (advance care planning). Plan: Advanced care planning was discussed with patient and family.  Advanced care planning forms were reviewed and discussed.  Risks, benefits and alternatives of gastroenterologic procedures were discussed in detail and all questions were answered.    30 minutes spent.

## 2021-06-10 NOTE — CONSULT NOTE ADULT - ASSESSMENT
Pt with UGI bleed likely due to Eliquis.  Rapid AF/Flutter.  Anemia.    D/w PA and nursing in detail  Prognosis guarded.  Pt is critically ill and requires ICU care and monitoring.

## 2021-06-10 NOTE — CONSULT NOTE ADULT - SUBJECTIVE AND OBJECTIVE BOX
PULMONARY/CRITICAL CARE        Patient is a 71y old  Male who presents with a chief complaint of GI bleed/dizziness (09 Jun 2021 20:53)  71M with afib on eliquis, DM2 not on insulin, hyperlipidemia, hx of CVA with no residuals, who presents with dizziness/GI bleed.  Patient reports having dark stools starting yesterday morning and then started to have dizziness when standing.  Also complained of some light-headedness when sitting.  However no LOC or headaches.  Denies any abdominal pain.  Had an episode of nausea and one episode of non-bloody non-bilious vomiting.  No chest pain, SOB, palpitations, weakness.  No recent illnesses such as fevers, cough.  Has been having some unintentional weight loss of 10lbs in the past month.  Did not have any history of GI bleed in the past.  Had an endoscopy about 4 years ago for dysphagia but was told he had a normal EGD (no ulcers).  No hx of colonoscopy.  No known family hx of GI issues or bleeding.   Denies NSAIDS, ASA    In the ED, patient's triage vitals were /71   RR 20, 97%  T 97.5F.  Found to have a hgb of 8.5 (last known hgb was 14.4 in 10/2020) and leukocytosis of 13.7.  Patient's EKG showed afib with a HR of 137.  Patient was ordered and transfused 1u of pRBC.  ICU was consulted for anemia and tachycardia.  GI (Dr. Giarrd) was also consulted.  Patient feels stable and asymptomatic as long as he does not move.      Pt. still has low Hgb despite transfusion and ivf. Still in rapid AF/flutter.  BRIEF HOSPITAL COURSE: ***    Events last 24 hours: ***    PAST MEDICAL & SURGICAL HISTORY:  GERD (gastroesophageal reflux disease)    Type 2 diabetes mellitus    Hyperlipidemia    Cerebrovascular accident (CVA)    Adhesive capsulitis of right shoulder    H/O pilonidal cyst      Allergies    No Known Allergies    Intolerances      FAMILY HISTORY/ social: ; no  cigs; drinks 1 beer daily  Family hx of lung cancer (Father, Mother, Sibling)        Review of Systems:  CONSTITUTIONAL: No fever, chills, or fatigue  EYES: No eye pain, visual disturbances, or discharge  ENMT:  No difficulty hearing, tinnitus, vertigo; No sinus or throat pain  NECK: No pain or stiffness  RESPIRATORY: No cough, wheezing, chills or hemoptysis; No shortness of breath  CARDIOVASCULAR: had chest pain, palpitations, dizziness,   GASTROINTESTINAL: No abdominal or epigastric pain. No nausea, vomiting, or hematemesis; No diarrhea or constipation. has melena   GENITOURINARY: No dysuria, frequency, hematuria, or incontinence  NEUROLOGICAL: No headaches, memory loss, loss of strength, numbness, or tremors  SKIN: No itching, burning, rashes, or lesions   MUSCULOSKELETAL: No joint pain or swelling; No muscle, back, or extremity pain  PSYCHIATRIC: No depression, anxiety, mood swings, or difficulty sleeping      Medications:    metoprolol tartrate Injectable 5 milliGRAM(s) IV Push every 6 hours PRN            pantoprazole Infusion 8 mG/Hr IV Continuous <Continuous>      atorvastatin 40 milliGRAM(s) Oral at bedtime  dextrose 40% Gel 15 Gram(s) Oral once  dextrose 50% Injectable 25 Gram(s) IV Push once  dextrose 50% Injectable 12.5 Gram(s) IV Push once  dextrose 50% Injectable 25 Gram(s) IV Push once  glucagon  Injectable 1 milliGRAM(s) IntraMuscular once  insulin lispro (ADMELOG) corrective regimen sliding scale   SubCutaneous three times a day before meals  insulin lispro (ADMELOG) corrective regimen sliding scale   SubCutaneous at bedtime    dextrose 5%. 1000 milliLiter(s) IV Continuous <Continuous>  dextrose 5%. 1000 milliLiter(s) IV Continuous <Continuous>                ICU Vital Signs Last 24 Hrs  T(C): 36.8 (10 Juan 2021 05:00), Max: 36.9 (10 Juan 2021 04:00)  T(F): 98.3 (10 Juan 2021 05:00), Max: 98.4 (10 Juan 2021 04:00)  HR: 122 (10 Juan 2021 06:34) (106 - 145)  BP: 65/53 (10 Juan 2021 06:34) (65/53 - 128/87)  BP(mean): 59 (10 Juan 2021 06:34) (59 - 100)  ABP: --  ABP(mean): --  RR: 19 (10 Juan 2021 06:34) (11 - 32)  SpO2: 100% (10 Juan 2021 06:34) (97% - 100%)    Vital Signs Last 24 Hrs  T(C): 36.8 (10 Juan 2021 05:00), Max: 36.9 (10 Juan 2021 04:00)  T(F): 98.3 (10 Juan 2021 05:00), Max: 98.4 (10 Juan 2021 04:00)  HR: 122 (10 Juan 2021 06:34) (106 - 145)  BP: 65/53 (10 Juan 2021 06:34) (65/53 - 128/87)  BP(mean): 59 (10 Juan 2021 06:34) (59 - 100)  RR: 19 (10 Juan 2021 06:34) (11 - 32)  SpO2: 100% (10 Juan 2021 06:34) (97% - 100%)        I&O's Detail    09 Jun 2021 07:01  -  10 Juan 2021 07:00  --------------------------------------------------------  IN:  Total IN: 0 mL    OUT:    Voided (mL): 800 mL  Total OUT: 800 mL    Total NET: -800 mL            LABS:                        7.2    10.60 )-----------( 163      ( 10 Juan 2021 04:27 )             22.1     06-10    139  |  111<H>  |  48<H>  ----------------------------<  175<H>  4.6   |  18<L>  |  0.93    Ca    7.9<L>      10 Juan 2021 04:27  Phos  2.7     06-10  Mg     1.7     06-10    TPro  4.5<L>  /  Alb  2.5<L>  /  TBili  0.5  /  DBili  x   /  AST  9<L>  /  ALT  24  /  AlkPhos  25<L>  06-10      CARDIAC MARKERS ( 10 Juan 2021 04:33 )  .035 ng/mL / x     / x     / x     / x          CAPILLARY BLOOD GLUCOSE      POCT Blood Glucose.: 226 mg/dL (09 Jun 2021 22:31)    PT/INR - ( 10 Juan 2021 04:27 )   PT: 14.2 sec;   INR: 1.18 ratio         PTT - ( 10 Juan 2021 04:27 )  PTT:28.7 sec    CULTURES:      Physical Examination:    General: No acute distress.  sitting up comfortably in bed    HEENT: Pupils equal, reactive to light.  Symmetric.    PULM: Clear to auscultation bilaterally, no wheeze rhonchi rales, good excursion no change percussion    CVS: irregular rate and rhythm, 1/6 sys murmurs, rubs, or gallops    ABD: Soft, nondistended, nontender, normoactive bowel sounds, no masses    EXT: No edema, nontender calves    SKIN: Warm and well perfused, no rashes noted.    NEURO: Alert, oriented, interactive, nonfocal    RADIOLOGY: ***    CRITICAL CARE TIME SPENT: ***

## 2021-06-11 LAB
ALBUMIN SERPL ELPH-MCNC: 2.6 G/DL — LOW (ref 3.3–5)
ALP SERPL-CCNC: 26 U/L — LOW (ref 30–120)
ALT FLD-CCNC: 14 U/L DA — SIGNIFICANT CHANGE UP (ref 10–60)
ANION GAP SERPL CALC-SCNC: 9 MMOL/L — SIGNIFICANT CHANGE UP (ref 5–17)
AST SERPL-CCNC: 14 U/L — SIGNIFICANT CHANGE UP (ref 10–40)
BASOPHILS # BLD AUTO: 0.03 K/UL — SIGNIFICANT CHANGE UP (ref 0–0.2)
BASOPHILS NFR BLD AUTO: 0.3 % — SIGNIFICANT CHANGE UP (ref 0–2)
BILIRUB SERPL-MCNC: 0.6 MG/DL — SIGNIFICANT CHANGE UP (ref 0.2–1.2)
BUN SERPL-MCNC: 27 MG/DL — HIGH (ref 7–23)
CALCIUM SERPL-MCNC: 7.5 MG/DL — LOW (ref 8.4–10.5)
CHLORIDE SERPL-SCNC: 116 MMOL/L — HIGH (ref 96–108)
CO2 SERPL-SCNC: 22 MMOL/L — SIGNIFICANT CHANGE UP (ref 22–31)
CREAT SERPL-MCNC: 1.05 MG/DL — SIGNIFICANT CHANGE UP (ref 0.5–1.3)
EOSINOPHIL # BLD AUTO: 0.06 K/UL — SIGNIFICANT CHANGE UP (ref 0–0.5)
EOSINOPHIL NFR BLD AUTO: 0.7 % — SIGNIFICANT CHANGE UP (ref 0–6)
GLUCOSE SERPL-MCNC: 122 MG/DL — HIGH (ref 70–99)
HCT VFR BLD CALC: 21.4 % — LOW (ref 39–50)
HCT VFR BLD CALC: 22.3 % — LOW (ref 39–50)
HCT VFR BLD CALC: 27.7 % — LOW (ref 39–50)
HCV AB S/CO SERPL IA: 0.11 S/CO — SIGNIFICANT CHANGE UP (ref 0–0.99)
HCV AB SERPL-IMP: SIGNIFICANT CHANGE UP
HGB BLD-MCNC: 7.2 G/DL — LOW (ref 13–17)
HGB BLD-MCNC: 7.6 G/DL — LOW (ref 13–17)
HGB BLD-MCNC: 9.4 G/DL — LOW (ref 13–17)
IMM GRANULOCYTES NFR BLD AUTO: 0.2 % — SIGNIFICANT CHANGE UP (ref 0–1.5)
LYMPHOCYTES # BLD AUTO: 2.73 K/UL — SIGNIFICANT CHANGE UP (ref 1–3.3)
LYMPHOCYTES # BLD AUTO: 31 % — SIGNIFICANT CHANGE UP (ref 13–44)
MAGNESIUM SERPL-MCNC: 2.2 MG/DL — SIGNIFICANT CHANGE UP (ref 1.6–2.6)
MCHC RBC-ENTMCNC: 31.1 PG — SIGNIFICANT CHANGE UP (ref 27–34)
MCHC RBC-ENTMCNC: 31.4 PG — SIGNIFICANT CHANGE UP (ref 27–34)
MCHC RBC-ENTMCNC: 31.4 PG — SIGNIFICANT CHANGE UP (ref 27–34)
MCHC RBC-ENTMCNC: 33.6 GM/DL — SIGNIFICANT CHANGE UP (ref 32–36)
MCHC RBC-ENTMCNC: 33.9 GM/DL — SIGNIFICANT CHANGE UP (ref 32–36)
MCHC RBC-ENTMCNC: 34.1 GM/DL — SIGNIFICANT CHANGE UP (ref 32–36)
MCV RBC AUTO: 91.4 FL — SIGNIFICANT CHANGE UP (ref 80–100)
MCV RBC AUTO: 92.6 FL — SIGNIFICANT CHANGE UP (ref 80–100)
MCV RBC AUTO: 93.4 FL — SIGNIFICANT CHANGE UP (ref 80–100)
MONOCYTES # BLD AUTO: 0.59 K/UL — SIGNIFICANT CHANGE UP (ref 0–0.9)
MONOCYTES NFR BLD AUTO: 6.7 % — SIGNIFICANT CHANGE UP (ref 2–14)
NEUTROPHILS # BLD AUTO: 5.39 K/UL — SIGNIFICANT CHANGE UP (ref 1.8–7.4)
NEUTROPHILS NFR BLD AUTO: 61.1 % — SIGNIFICANT CHANGE UP (ref 43–77)
NRBC # BLD: 0 /100 WBCS — SIGNIFICANT CHANGE UP (ref 0–0)
PHOSPHATE SERPL-MCNC: 2.7 MG/DL — SIGNIFICANT CHANGE UP (ref 2.5–4.5)
PLATELET # BLD AUTO: 132 K/UL — LOW (ref 150–400)
PLATELET # BLD AUTO: 137 K/UL — LOW (ref 150–400)
PLATELET # BLD AUTO: 139 K/UL — LOW (ref 150–400)
POTASSIUM SERPL-MCNC: 3.9 MMOL/L — SIGNIFICANT CHANGE UP (ref 3.5–5.3)
POTASSIUM SERPL-SCNC: 3.9 MMOL/L — SIGNIFICANT CHANGE UP (ref 3.5–5.3)
PROT SERPL-MCNC: 4.6 G/DL — LOW (ref 6–8.3)
RBC # BLD: 2.29 M/UL — LOW (ref 4.2–5.8)
RBC # BLD: 2.44 M/UL — LOW (ref 4.2–5.8)
RBC # BLD: 2.99 M/UL — LOW (ref 4.2–5.8)
RBC # FLD: 15.9 % — HIGH (ref 10.3–14.5)
RBC # FLD: 16.1 % — HIGH (ref 10.3–14.5)
RBC # FLD: 16.3 % — HIGH (ref 10.3–14.5)
SODIUM SERPL-SCNC: 147 MMOL/L — HIGH (ref 135–145)
WBC # BLD: 7.8 K/UL — SIGNIFICANT CHANGE UP (ref 3.8–10.5)
WBC # BLD: 8.64 K/UL — SIGNIFICANT CHANGE UP (ref 3.8–10.5)
WBC # BLD: 8.82 K/UL — SIGNIFICANT CHANGE UP (ref 3.8–10.5)
WBC # FLD AUTO: 7.8 K/UL — SIGNIFICANT CHANGE UP (ref 3.8–10.5)
WBC # FLD AUTO: 8.64 K/UL — SIGNIFICANT CHANGE UP (ref 3.8–10.5)
WBC # FLD AUTO: 8.82 K/UL — SIGNIFICANT CHANGE UP (ref 3.8–10.5)

## 2021-06-11 PROCEDURE — 99233 SBSQ HOSP IP/OBS HIGH 50: CPT

## 2021-06-11 RX ORDER — KETOROLAC TROMETHAMINE 30 MG/ML
15 SYRINGE (ML) INJECTION ONCE
Refills: 0 | Status: DISCONTINUED | OUTPATIENT
Start: 2021-06-11 | End: 2021-06-11

## 2021-06-11 RX ORDER — SUCRALFATE 1 G
1 TABLET ORAL EVERY 6 HOURS
Refills: 0 | Status: DISCONTINUED | OUTPATIENT
Start: 2021-06-11 | End: 2021-06-14

## 2021-06-11 RX ORDER — SODIUM CHLORIDE 9 MG/ML
1000 INJECTION, SOLUTION INTRAVENOUS
Refills: 0 | Status: DISCONTINUED | OUTPATIENT
Start: 2021-06-11 | End: 2021-06-13

## 2021-06-11 RX ORDER — IRON SUCROSE 20 MG/ML
100 INJECTION, SOLUTION INTRAVENOUS ONCE
Refills: 0 | Status: DISCONTINUED | OUTPATIENT
Start: 2021-06-11 | End: 2021-06-11

## 2021-06-11 RX ORDER — IRON SUCROSE 20 MG/ML
100 INJECTION, SOLUTION INTRAVENOUS ONCE
Refills: 0 | Status: COMPLETED | OUTPATIENT
Start: 2021-06-11 | End: 2021-06-11

## 2021-06-11 RX ADMIN — Medication 1 GRAM(S): at 23:40

## 2021-06-11 RX ADMIN — Medication 5 MILLIGRAM(S): at 23:41

## 2021-06-11 RX ADMIN — SODIUM CHLORIDE 75 MILLILITER(S): 9 INJECTION, SOLUTION INTRAVENOUS at 23:40

## 2021-06-11 RX ADMIN — Medication 15 MILLIGRAM(S): at 06:08

## 2021-06-11 RX ADMIN — Medication 15 MILLIGRAM(S): at 06:23

## 2021-06-11 RX ADMIN — Medication 5 MILLIGRAM(S): at 06:09

## 2021-06-11 RX ADMIN — IRON SUCROSE 100 MILLIGRAM(S): 20 INJECTION, SOLUTION INTRAVENOUS at 09:25

## 2021-06-11 RX ADMIN — Medication 15 MILLIGRAM(S): at 22:40

## 2021-06-11 RX ADMIN — PANTOPRAZOLE SODIUM 10 MG/HR: 20 TABLET, DELAYED RELEASE ORAL at 23:38

## 2021-06-11 RX ADMIN — SODIUM CHLORIDE 75 MILLILITER(S): 9 INJECTION, SOLUTION INTRAVENOUS at 00:46

## 2021-06-11 RX ADMIN — Medication 15 MILLIGRAM(S): at 22:26

## 2021-06-11 RX ADMIN — PANTOPRAZOLE SODIUM 10 MG/HR: 20 TABLET, DELAYED RELEASE ORAL at 09:26

## 2021-06-11 RX ADMIN — Medication 1 GRAM(S): at 18:32

## 2021-06-11 RX ADMIN — SODIUM CHLORIDE 75 MILLILITER(S): 9 INJECTION, SOLUTION INTRAVENOUS at 09:26

## 2021-06-11 RX ADMIN — ATORVASTATIN CALCIUM 40 MILLIGRAM(S): 80 TABLET, FILM COATED ORAL at 21:41

## 2021-06-11 RX ADMIN — Medication 5 MILLIGRAM(S): at 00:46

## 2021-06-11 RX ADMIN — PANTOPRAZOLE SODIUM 10 MG/HR: 20 TABLET, DELAYED RELEASE ORAL at 04:42

## 2021-06-11 NOTE — PROGRESS NOTE ADULT - ASSESSMENT
ugib   duodenal bleed   platelet dysfunction    plan  in and out  check cbc  adv diet   ppi bid and carafate 1 gr q 6   to follow    Advanced care planning was discussed with patient and family.  Advanced care planning forms were reviewed and discussed.  Risks, benefits and alternatives of gastroenterologic procedures were discussed in detail and all questions were answered.    30 minutes spent.

## 2021-06-11 NOTE — PROGRESS NOTE ADULT - SUBJECTIVE AND OBJECTIVE BOX
Patient seen and examined at bedside.  Noted overnight events  Patient today reports he is weak, not much of appetite  Vitals: BP soft. afebrile  Hb post transfusion 9.9. t  This am HB dropped to 7.6  NPO on  d5lR  all anticoagulation on hold  on protonix drip  on lopressor IV for atrial flutter    Review of Systems:  General:denies fever chills, headache, weakness  HEENT: denies blurry vision,diffculty swallowing, difficulty hearing, tinnitus  Cardiovascular: denies chest pain  ,palpitations  Pulmonary:denies shortness of breath, cough, wheezing, hemoptysis  Gastrointestinal: denies abdominal pain, constipation, diarrhea,nausea , vomiting, hematochezia  : denies hematuria, dysuria, or incontinence  Neurological: denies weakness, numbness , tingling, dizziness, tremors  MSK: denies muscle pain, difficulty ambulating, swelling, back pain  skin: denies skin rash, itching, burning, or  skin lesions  Psychiatrical: denies mood disturbances, anxierty, feeling depressed, depression , or difficulty sleeping    Objective:  Vitals  T(C): 36.7 (06-11-21 @ 08:19), Max: 36.9 (06-11-21 @ 05:21)  HR: 94 (06-11-21 @ 12:00) (63 - 115)  BP: 92/80 (06-11-21 @ 12:00) (90/71 - 147/88)  RR: 20 (06-11-21 @ 12:00) (9 - 27)  SpO2: 100% (06-11-21 @ 12:00) (95% - 100%)    Physical Exam:  General: comfortable, no acute distress, well nourished  HEENT: Atraumatic, no LAD, trachea midline, PERRLA  Cardiovascular: normal s1s2, no murmurs, gallops or fricition rubs  Pulmonary: clear to ausculation Bilaterally, no wheezing , rhonchi  Gastrointestinal: soft non tender non distended, no masses felt, no organomegally  Muscloskeletal: no lower extremity edema, intact bilateral lower extremity pulses  Neurological: CN II-12 intact. No focal weakness  Psychiatrical: normal mood, cooperative  SKIN: no rash, lesions or ulcers    Labs:                          7.6    8.82  )-----------( 137      ( 11 Jun 2021 05:51 )             22.3     06-11    147<H>  |  116<H>  |  27<H>  ----------------------------<  122<H>  3.9   |  22  |  1.05    Ca    7.5<L>      11 Jun 2021 05:50  Phos  2.7     06-11  Mg     2.2     06-11    TPro  4.6<L>  /  Alb  2.6<L>  /  TBili  0.6  /  DBili  x   /  AST  14  /  ALT  14  /  AlkPhos  26<L>  06-11    LIVER FUNCTIONS - ( 11 Jun 2021 05:50 )  Alb: 2.6 g/dL / Pro: 4.6 g/dL / ALK PHOS: 26 U/L / ALT: 14 U/L DA / AST: 14 U/L / GGT: x           PT/INR - ( 10 Juan 2021 04:27 )   PT: 14.2 sec;   INR: 1.18 ratio         PTT - ( 10 Juan 2021 04:27 )  PTT:28.7 sec      Active Medications  MEDICATIONS  (STANDING):  atorvastatin 40 milliGRAM(s) Oral at bedtime  dextrose 40% Gel 15 Gram(s) Oral once  dextrose 5% + lactated ringers. 1000 milliLiter(s) (75 mL/Hr) IV Continuous <Continuous>  dextrose 5%. 1000 milliLiter(s) (50 mL/Hr) IV Continuous <Continuous>  dextrose 5%. 1000 milliLiter(s) (100 mL/Hr) IV Continuous <Continuous>  dextrose 50% Injectable 25 Gram(s) IV Push once  dextrose 50% Injectable 12.5 Gram(s) IV Push once  dextrose 50% Injectable 25 Gram(s) IV Push once  glucagon  Injectable 1 milliGRAM(s) IntraMuscular once  insulin lispro (ADMELOG) corrective regimen sliding scale   SubCutaneous every 6 hours  metoprolol tartrate Injectable 5 milliGRAM(s) IV Push every 6 hours  pantoprazole Infusion 8 mG/Hr (10 mL/Hr) IV Continuous <Continuous>    MEDICATIONS  (PRN):

## 2021-06-11 NOTE — PROGRESS NOTE ADULT - ASSESSMENT
71M with afib on eliquis, DM2 not on insulin, hyperlipidemia, hx of CVA with no residuals, who presents with dizziness/GI bleed.      Severe Acute blood loss anemia likely due to UGI bleed/melena   -s/p3 units of PRBC with poor response  -s/p Kcentra  -GI /critical care consulted  -s/p emergent EGD. active bleed found in duodenal sweep + gastritis s/p APC cautery  -NPO  -on protonix drip  -Hb 6/11 7.6  plan:  tranfuse one more unit of PRBC due to low bp  holding off on CTA now  maintain NPO  maintain IV access  transfuse PRN (hgb <7 or symptomatic)  active TS    hypotension  likely due to lopressor and anemia  will check CBC stat and initate prbc now      Dizziness/Nausea secondary to Upper GI Bleed  Neurology consulted :low suspicion for CVA    Tachycardia/aflutter -  likely rapid aflutter due to abrupt hypovolemia  Cardiology consulted  s/p diltizem changed to lopressor IV  plan  c/w lopressor  c/w tele    Unintentional weight loss  - consider CT C/A/P after GI bleeding has been stablized    DM2   - keep NPO  - start low dose coverage scale with FS q6h while NPO  - check A1c  - diabetic diet when can able to take PO    HLD  - cont with atorvastatin 40mg daily    Preventive measures  - hold AC 2/2 GI bleed

## 2021-06-11 NOTE — PROGRESS NOTE ADULT - SUBJECTIVE AND OBJECTIVE BOX
Neurology follow up note    AMY BREWER71yMale      Interval History:    Patient feels ok no new complaints.    MEDICATIONS    atorvastatin 40 milliGRAM(s) Oral at bedtime  dextrose 40% Gel 15 Gram(s) Oral once  dextrose 5%. 1000 milliLiter(s) IV Continuous <Continuous>  dextrose 5%. 1000 milliLiter(s) IV Continuous <Continuous>  dextrose 50% Injectable 25 Gram(s) IV Push once  dextrose 50% Injectable 12.5 Gram(s) IV Push once  dextrose 50% Injectable 25 Gram(s) IV Push once  glucagon  Injectable 1 milliGRAM(s) IntraMuscular once  insulin lispro (ADMELOG) corrective regimen sliding scale   SubCutaneous every 6 hours  iron sucrose Injectable 100 milliGRAM(s) IV Push once  lactated ringers. 1000 milliLiter(s) IV Continuous <Continuous>  metoprolol tartrate Injectable 5 milliGRAM(s) IV Push every 6 hours  pantoprazole Infusion 8 mG/Hr IV Continuous <Continuous>      Allergies    No Known Allergies    Intolerances        Height (cm): 172.7 (06-10 @ 10:40)  Weight (kg): 80.7 (06-10 @ 10:40)  BMI (kg/m2): 27.1 (06-10 @ 10:40)    Vital Signs Last 24 Hrs  T(C): 36.9 (11 Jun 2021 05:21), Max: 37.2 (10 Juan 2021 08:11)  T(F): 98.4 (11 Jun 2021 05:21), Max: 98.9 (10 Juan 2021 08:11)  HR: 78 (11 Jun 2021 07:00) (63 - 115)  BP: 96/58 (11 Jun 2021 07:00) (90/71 - 164/68)  BP(mean): 71 (11 Jun 2021 07:00) (66 - 104)  RR: 22 (11 Jun 2021 07:00) (9 - 27)  SpO2: 100% (11 Jun 2021 07:00) (95% - 100%)      REVIEW OF SYSTEMS:  Constitutional:  The patient denies fever, chills, or night sweats.  Head:  No headache.  Eyes:  No double vision or blurry vision.  Ears:  No ringing in the ears.  Neck:  No neck pain.  Respiratory:  No shortness of breath.  Cardiovascular:  No chest pain.  Abdomen:  No nausea, vomiting, or abdominal pain.  Extremities/Neurological:  No numbness or tingling.  Musculoskeletal:  No joint pain.    PHYSICAL EXAMINATION:   HEENT:  Head:  Normocephalic, atraumatic.  Eyes:  No scleral icterus.  Ears:  Hearing bilaterally intact.  NECK:  Supple.  RESPIRATORY:  Good air entry bilaterally.  CARDIOVASCULAR:  S1 and S2 heard.  ABDOMEN:  Soft and nontender.  EXTREMITIES:  No clubbing or cyanosis was noted.      NEUROLOGIC:  The patient is awake, alert, and oriented x3.  Extraocular movements were intact.  Pupils were equal, round, and reactive bilaterally 3 mm to 2 mm.  Speech was fluent.  Smile was symmetric.  Motor:  Bilateral upper were 5/5, does have slightly decreased range of motion of the right shoulder, but has a history of injury there.  Bilateral lower extremities were 4+/5.  Sensory:  Bilateral upper and lower intact to light touch.            LABS:  CBC Full  -  ( 11 Jun 2021 05:51 )  WBC Count : 8.82 K/uL  RBC Count : 2.44 M/uL  Hemoglobin : 7.6 g/dL  Hematocrit : 22.3 %  Platelet Count - Automated : 137 K/uL  Mean Cell Volume : 91.4 fl  Mean Cell Hemoglobin : 31.1 pg  Mean Cell Hemoglobin Concentration : 34.1 gm/dL  Auto Neutrophil # : 5.39 K/uL  Auto Lymphocyte # : 2.73 K/uL  Auto Monocyte # : 0.59 K/uL  Auto Eosinophil # : 0.06 K/uL  Auto Basophil # : 0.03 K/uL  Auto Neutrophil % : 61.1 %  Auto Lymphocyte % : 31.0 %  Auto Monocyte % : 6.7 %  Auto Eosinophil % : 0.7 %  Auto Basophil % : 0.3 %      06-11    147<H>  |  116<H>  |  27<H>  ----------------------------<  122<H>  3.9   |  22  |  1.05    Ca    7.5<L>      11 Jun 2021 05:50  Phos  2.7     06-11  Mg     2.2     06-11    TPro  4.6<L>  /  Alb  2.6<L>  /  TBili  0.6  /  DBili  x   /  AST  14  /  ALT  14  /  AlkPhos  26<L>  06-11    Hemoglobin A1C:     LIVER FUNCTIONS - ( 11 Jun 2021 05:50 )  Alb: 2.6 g/dL / Pro: 4.6 g/dL / ALK PHOS: 26 U/L / ALT: 14 U/L DA / AST: 14 U/L / GGT: x           Vitamin B12   PT/INR - ( 10 Juan 2021 04:27 )   PT: 14.2 sec;   INR: 1.18 ratio         PTT - ( 10 Juan 2021 04:27 )  PTT:28.7 sec      RADIOLOGY      ANALYSIS AND PLAN:  This is a 71-year-old with episodes of dizziness, ataxia, feeling weak all over.  The clinical impression is most likely presyncopal event leading to cerebral hypoperfusion secondary to underlying volume depletion with possibly GI issues, suspect less likely this was a TIA or a subclinical seizure event.  Telemetry evaluation.  Please maintain systolic blood pressure greater than 100 to help maintain cerebral perfusion.  For diabetes, strict control of blood sugars.  For history of high cholesterol, continue the patient on statin.  monitor H/H as needed     Greater than 40 minutes of time was spent with the patient, plan of care, reviewing data, speaking to the multidisciplinary healthcare team with greater than 50% of that time in counseling and care coordination.

## 2021-06-11 NOTE — PROGRESS NOTE ADULT - SUBJECTIVE AND OBJECTIVE BOX
PULMONARY/CRITICAL CARE    DOS 6/11/21  Still dark stool . BP better. HR better. Had EGD--multiple duodenal ulcers.     Patient is a 71y old  Male who presents with a chief complaint of GI bleed/dizziness (09 Jun 2021 20:53)  71M with afib on eliquis, DM2 not on insulin, hyperlipidemia, hx of CVA with no residuals, who presents with dizziness/GI bleed.  Patient reports having dark stools starting yesterday morning and then started to have dizziness when standing.  Also complained of some light-headedness when sitting.  However no LOC or headaches.  Denies any abdominal pain.  Had an episode of nausea and one episode of non-bloody non-bilious vomiting.  No chest pain, SOB, palpitations, weakness.  No recent illnesses such as fevers, cough.  Has been having some unintentional weight loss of 10lbs in the past month.  Did not have any history of GI bleed in the past.  Had an endoscopy about 4 years ago for dysphagia but was told he had a normal EGD (no ulcers).  No hx of colonoscopy.  No known family hx of GI issues or bleeding.   Denies NSAIDS, ASA    In the ED, patient's triage vitals were /71   RR 20, 97%  T 97.5F.  Found to have a hgb of 8.5 (last known hgb was 14.4 in 10/2020) and leukocytosis of 13.7.  Patient's EKG showed afib with a HR of 137.  Patient was ordered and transfused 1u of pRBC.  ICU was consulted for anemia and tachycardia.  GI (Dr. Girard) was also consulted.  Patient feels stable and asymptomatic as long as he does not move.      Pt. still has low Hgb despite transfusion and ivf. Still in rapid AF/flutter.  BRIEF HOSPITAL COURSE: ***    Events last 24 hours: ***    PAST MEDICAL & SURGICAL HISTORY:  GERD (gastroesophageal reflux disease)    Type 2 diabetes mellitus    Hyperlipidemia    Cerebrovascular accident (CVA)    Adhesive capsulitis of right shoulder    H/O pilonidal cyst      Allergies    No Known Allergies    Intolerances      FAMILY HISTORY/ social: ; no  cigs; drinks 1 beer daily  Family hx of lung cancer (Father, Mother, Sibling)        Review of Systems:  CONSTITUTIONAL: No fever, chills, or fatigue  EYES: No eye pain, visual disturbances, or discharge  ENMT:  No difficulty hearing, tinnitus, vertigo; No sinus or throat pain  NECK: No pain or stiffness  RESPIRATORY: No cough, wheezing, chills or hemoptysis; No shortness of breath  CARDIOVASCULAR: had chest pain, palpitations, dizziness,   GASTROINTESTINAL: No abdominal or epigastric pain. No nausea, vomiting, or hematemesis; No diarrhea or constipation. has melena   GENITOURINARY: No dysuria, frequency, hematuria, or incontinence  NEUROLOGICAL: No headaches, memory loss, loss of strength, numbness, or tremors  SKIN: No itching, burning, rashes, or lesions   MUSCULOSKELETAL: No joint pain or swelling; No muscle, back, or extremity pain  PSYCHIATRIC: No depression, anxiety, mood swings, or difficulty sleeping      Medications:    metoprolol tartrate Injectable 5 milliGRAM(s) IV Push every 6 hours PRN            pantoprazole Infusion 8 mG/Hr IV Continuous <Continuous>      atorvastatin 40 milliGRAM(s) Oral at bedtime  dextrose 40% Gel 15 Gram(s) Oral once  dextrose 50% Injectable 25 Gram(s) IV Push once  dextrose 50% Injectable 12.5 Gram(s) IV Push once  dextrose 50% Injectable 25 Gram(s) IV Push once  glucagon  Injectable 1 milliGRAM(s) IntraMuscular once  insulin lispro (ADMELOG) corrective regimen sliding scale   SubCutaneous three times a day before meals  insulin lispro (ADMELOG) corrective regimen sliding scale   SubCutaneous at bedtime    dextrose 5%. 1000 milliLiter(s) IV Continuous <Continuous>  dextrose 5%. 1000 milliLiter(s) IV Continuous <Continuous>                ICU Vital Signs Last 24 Hrs  T(C): 36.8 (10 Juan 2021 05:00), Max: 36.9 (10 Juan 2021 04:00)  T(F): 98.3 (10 Juan 2021 05:00), Max: 98.4 (10 Juan 2021 04:00)  HR: 122 (10 Juan 2021 06:34) (106 - 145)  BP: 65/53 (10 Juan 2021 06:34) (65/53 - 128/87)  BP(mean): 59 (10 Juan 2021 06:34) (59 - 100)  ABP: --  ABP(mean): --  RR: 19 (10 Juan 2021 06:34) (11 - 32)  SpO2: 100% (10 Juan 2021 06:34) (97% - 100%)    Vital Signs Last 24 Hrs  T(C): 36.8 (10 Juan 2021 05:00), Max: 36.9 (10 Juan 2021 04:00)  T(F): 98.3 (10 Juan 2021 05:00), Max: 98.4 (10 Juan 2021 04:00)  HR: 122 (10 Juan 2021 06:34) (106 - 145)  BP: 65/53 (10 Juan 2021 06:34) (65/53 - 128/87)  BP(mean): 59 (10 Juan 2021 06:34) (59 - 100)  RR: 19 (10 Juan 2021 06:34) (11 - 32)  SpO2: 100% (10 Juan 2021 06:34) (97% - 100%)        I&O's Detail    09 Jun 2021 07:01  -  10 Juan 2021 07:00  --------------------------------------------------------  IN:  Total IN: 0 mL    OUT:    Voided (mL): 800 mL  Total OUT: 800 mL    Total NET: -800 mL            LABS:                        7.2    10.60 )-----------( 163      ( 10 Juan 2021 04:27 )             22.1     06-10    139  |  111<H>  |  48<H>  ----------------------------<  175<H>  4.6   |  18<L>  |  0.93    Ca    7.9<L>      10 Juan 2021 04:27  Phos  2.7     06-10  Mg     1.7     06-10    TPro  4.5<L>  /  Alb  2.5<L>  /  TBili  0.5  /  DBili  x   /  AST  9<L>  /  ALT  24  /  AlkPhos  25<L>  06-10      CARDIAC MARKERS ( 10 Juan 2021 04:33 )  .035 ng/mL / x     / x     / x     / x          CAPILLARY BLOOD GLUCOSE      POCT Blood Glucose.: 226 mg/dL (09 Jun 2021 22:31)    PT/INR - ( 10 Juan 2021 04:27 )   PT: 14.2 sec;   INR: 1.18 ratio         PTT - ( 10 Juan 2021 04:27 )  PTT:28.7 sec    CULTURES:      Physical Examination:    General: No acute distress.  sitting up comfortably in bed    HEENT: Pupils equal, reactive to light.  Symmetric.    PULM: Clear to auscultation bilaterally, no wheeze rhonchi rales, good excursion no change percussion    CVS: irregular rate and rhythm, 1/6 sys murmurs, rubs, or gallops    ABD: Soft, nondistended, nontender, normoactive bowel sounds, no masses    EXT: No edema, nontender calves    SKIN: Warm and well perfused, no rashes noted.    NEURO: Alert, oriented, interactive, nonfocal    RADIOLOGY: ***    General:  · Primary Surgeon	Mitchell  · Co-Surgeon	Shaji  · Assistant(s)	N/A  · Type of Anesthesia	MAC  · Anesthesiologist	Jayshree    Pre-Op Diagnosis, Post-Op Diagnosis and Procedure:   Pre-Op, Post-Op and Procedure Selector:  ·  PRE-OP DIAGNOSIS:  Melena 10-Juan-2021 12:21:58  Miladys Medrano.  ·  PROCEDURES:  UGI endoscopy 10-Juan-2021 12:22:08 see full operative report for detail  1. Active bleeding seen in the duodenal sweep with multiple duodenal ulcers, s/p APC and Bipolar cautery with good control of bleeding by the end of the procedure  2. gastritis  .  Rec:  1. NPO  2. PPI gtt  3. hold anti-plt agent  4. CBC q12 until stable  5. transfuse if Hgb <7   Miladys Medrano.      Operative Findings:  · Operative Findings	see above    Specimens/Blood Loss/IV/Output/Protocol/VTE:   Specimens/Blood Loss/IV/Output/Protocol/VTE:  · Specimens	0  · Estimated Blood Loss	0 milliLiter(s)      Electronic Signatures:  Miladys Medrano)  (Signed 10-Juan-2021 12:23)  	Authored: General, Pre-Op Diagnosis, Post-Op Diagnosis and Procedure, Operative Findings, Specimens/Blood Loss/IV/Output/Protocol/VTE      Last Updated: 10-Juan-2021 12:23 by Miladys Medrano)

## 2021-06-11 NOTE — PROGRESS NOTE ADULT - SUBJECTIVE AND OBJECTIVE BOX
ICU Progress Note    HPI:    S:    Pt seen and examined  HD #  Pt here for      Allergies    No Known Allergies    Intolerances        MEDICATIONS  (STANDING):  atorvastatin 40 milliGRAM(s) Oral at bedtime  dextrose 40% Gel 15 Gram(s) Oral once  dextrose 5% + lactated ringers. 1000 milliLiter(s) (75 mL/Hr) IV Continuous <Continuous>  dextrose 5%. 1000 milliLiter(s) (50 mL/Hr) IV Continuous <Continuous>  dextrose 5%. 1000 milliLiter(s) (100 mL/Hr) IV Continuous <Continuous>  dextrose 50% Injectable 25 Gram(s) IV Push once  dextrose 50% Injectable 12.5 Gram(s) IV Push once  dextrose 50% Injectable 25 Gram(s) IV Push once  glucagon  Injectable 1 milliGRAM(s) IntraMuscular once  insulin lispro (ADMELOG) corrective regimen sliding scale   SubCutaneous every 6 hours  metoprolol tartrate Injectable 5 milliGRAM(s) IV Push every 6 hours  pantoprazole Infusion 8 mG/Hr (10 mL/Hr) IV Continuous <Continuous>  sucralfate suspension 1 Gram(s) Oral every 6 hours    MEDICATIONS  (PRN):      Drug Dosing Weight  Height (cm): 172.7 (10 Juan 2021 10:40)  Weight (kg): 80.7 (10 Juan 2021 10:40)  BMI (kg/m2): 27.1 (10 Juan 2021 10:40)  BSA (m2): 1.94 (10 Juan 2021 10:40)    PAST MEDICAL & SURGICAL HISTORY:  GERD (gastroesophageal reflux disease)    Type 2 diabetes mellitus    Hyperlipidemia    Cerebrovascular accident (CVA)    Adhesive capsulitis of right shoulder    H/O pilonidal cyst        FAMILY HISTORY:  Family hx of lung cancer (Father, Mother, Sibling)        ROS: See HPI; otherwise, all systems reviewed and negative.    O:    ICU Vital Signs Last 24 Hrs  T(C): 36.8 (11 Jun 2021 19:55), Max: 36.9 (11 Jun 2021 05:21)  T(F): 98.3 (11 Jun 2021 19:55), Max: 98.4 (11 Jun 2021 05:21)  HR: 98 (11 Jun 2021 20:00) (63 - 115)  BP: 122/67 (11 Jun 2021 20:00) (90/71 - 125/64)  BP(mean): 85 (11 Jun 2021 20:00) (70 - 94)  ABP: --  ABP(mean): --  RR: 20 (11 Jun 2021 20:00) (9 - 25)  SpO2: 99% (11 Jun 2021 20:00) (97% - 100%)          I&O's Detail    10 Juan 2021 07:01  -  11 Jun 2021 07:00  --------------------------------------------------------  IN:    dextrose 5% + lactated ringers: 375 mL    Lactated Ringers: 600 mL    Lactated Ringers: 550 mL    Pantoprazole: 220 mL  Total IN: 1745 mL    OUT:    Voided (mL): 1730 mL  Total OUT: 1730 mL    Total NET: 15 mL      11 Jun 2021 07:01  -  11 Jun 2021 20:55  --------------------------------------------------------  IN:    dextrose 5% + lactated ringers: 675 mL    Lactated Ringers: 375 mL    Pantoprazole: 140 mL    PRBCs (Packed Red Blood Cells): 300 mL  Total IN: 1490 mL    OUT:    Voided (mL): 640 mL  Total OUT: 640 mL    Total NET: 850 mL              PE:    Constitutional: Healthy M lying in bed in NAD.   Neck: No JVD, trachea midline.   Respiratory: CTA B/L good BS B/L no W/R/R.  Cardiovascular: S1S2+ RRR no M/R/G.  Gastrointestinal: Soft, NTND.  Extremities: No peripheral edema, No cyanosis, clubbing.  Neurological: Awake, conversive, alert, no gross deficits.  Skin: No rashes, warm, moist.    LABS:    CBC Full  -  ( 11 Jun 2021 20:23 )  WBC Count : 8.64 K/uL  RBC Count : 2.99 M/uL  Hemoglobin : 9.4 g/dL  Hematocrit : 27.7 %  Platelet Count - Automated : 139 K/uL  Mean Cell Volume : 92.6 fl  Mean Cell Hemoglobin : 31.4 pg  Mean Cell Hemoglobin Concentration : 33.9 gm/dL  Auto Neutrophil # : x  Auto Lymphocyte # : x  Auto Monocyte # : x  Auto Eosinophil # : x  Auto Basophil # : x  Auto Neutrophil % : x  Auto Lymphocyte % : x  Auto Monocyte % : x  Auto Eosinophil % : x  Auto Basophil % : x    06-11    147<H>  |  116<H>  |  27<H>  ----------------------------<  122<H>  3.9   |  22  |  1.05    Ca    7.5<L>      11 Jun 2021 05:50  Phos  2.7     06-11  Mg     2.2     06-11    TPro  4.6<L>  /  Alb  2.6<L>  /  TBili  0.6  /  DBili  x   /  AST  14  /  ALT  14  /  AlkPhos  26<L>  06-11    PT/INR - ( 10 Juan 2021 04:27 )   PT: 14.2 sec;   INR: 1.18 ratio         PTT - ( 10 Juan 2021 04:27 )  PTT:28.7 sec    CAPILLARY BLOOD GLUCOSE      POCT Blood Glucose.: 140 mg/dL (11 Jun 2021 18:34)  POCT Blood Glucose.: 130 mg/dL (11 Jun 2021 11:23)  POCT Blood Glucose.: 119 mg/dL (11 Jun 2021 06:13)  POCT Blood Glucose.: 163 mg/dL (10 Juan 2021 22:50)    CARDIAC MARKERS ( 10 Juan 2021 04:33 )  .035 ng/mL / x     / x     / x     / x          LIVER FUNCTIONS - ( 11 Jun 2021 05:50 )  Alb: 2.6 g/dL / Pro: 4.6 g/dL / ALK PHOS: 26 U/L / ALT: 14 U/L DA / AST: 14 U/L / GGT: x               A:    71yMale  HD #    Here for:    1.    P:    Neuro: GCS 15. Monitor for delirium.  Continue to optimize pain control. Serial Neurologic assessments.    HEENT: No issues.    CV: Continue hemodynamic monitoring    Pulm: Pulmonary toilet.  Continue incentive spirometer.  Chest PT.  Encourage OOB to chair and ambulation. Nebs. f/u ABG, CXR.    GI/Nutrition: Cont diet, bowel regimen.    /Renal: Monitor UOP. Monitor BMP.  Replete Lytes as needed.    HEME- Chemical and mechanical DVT ppx. f/u CBC. f/u coags as needed.    ID:  Cont abx. f/u Cx's.    Lines/Tubes:     Endo: Maintain euglycemia.    Skin:  Cont skin care, pressure ulcer prevention.    Dispo: Cont care.       ICU Progress Note    HPI: GI Bleed    S: s/p EGD. Still NPO. H/H ok. Patient with no complaints. HD stable.     Allergies    No Known Allergies    Intolerances        MEDICATIONS  (STANDING):  atorvastatin 40 milliGRAM(s) Oral at bedtime  dextrose 40% Gel 15 Gram(s) Oral once  dextrose 5% + lactated ringers. 1000 milliLiter(s) (75 mL/Hr) IV Continuous <Continuous>  dextrose 5%. 1000 milliLiter(s) (50 mL/Hr) IV Continuous <Continuous>  dextrose 5%. 1000 milliLiter(s) (100 mL/Hr) IV Continuous <Continuous>  dextrose 50% Injectable 25 Gram(s) IV Push once  dextrose 50% Injectable 12.5 Gram(s) IV Push once  dextrose 50% Injectable 25 Gram(s) IV Push once  glucagon  Injectable 1 milliGRAM(s) IntraMuscular once  insulin lispro (ADMELOG) corrective regimen sliding scale   SubCutaneous every 6 hours  metoprolol tartrate Injectable 5 milliGRAM(s) IV Push every 6 hours  pantoprazole Infusion 8 mG/Hr (10 mL/Hr) IV Continuous <Continuous>  sucralfate suspension 1 Gram(s) Oral every 6 hours    MEDICATIONS  (PRN):      Drug Dosing Weight  Height (cm): 172.7 (10 Juan 2021 10:40)  Weight (kg): 80.7 (10 Juan 2021 10:40)  BMI (kg/m2): 27.1 (10 Juan 2021 10:40)  BSA (m2): 1.94 (10 Juan 2021 10:40)    PAST MEDICAL & SURGICAL HISTORY:  GERD (gastroesophageal reflux disease)    Type 2 diabetes mellitus    Hyperlipidemia    Cerebrovascular accident (CVA)    Adhesive capsulitis of right shoulder    H/O pilonidal cyst        FAMILY HISTORY:  Family hx of lung cancer (Father, Mother, Sibling)        ROS: See HPI; otherwise, all systems reviewed and negative.    O:    ICU Vital Signs Last 24 Hrs  T(C): 36.8 (11 Jun 2021 19:55), Max: 36.9 (11 Jun 2021 05:21)  T(F): 98.3 (11 Jun 2021 19:55), Max: 98.4 (11 Jun 2021 05:21)  HR: 98 (11 Jun 2021 20:00) (63 - 115)  BP: 122/67 (11 Jun 2021 20:00) (90/71 - 125/64)  BP(mean): 85 (11 Jun 2021 20:00) (70 - 94)  ABP: --  ABP(mean): --  RR: 20 (11 Jun 2021 20:00) (9 - 25)  SpO2: 99% (11 Jun 2021 20:00) (97% - 100%)          I&O's Detail    10 Juan 2021 07:01  -  11 Jun 2021 07:00  --------------------------------------------------------  IN:    dextrose 5% + lactated ringers: 375 mL    Lactated Ringers: 600 mL    Lactated Ringers: 550 mL    Pantoprazole: 220 mL  Total IN: 1745 mL    OUT:    Voided (mL): 1730 mL  Total OUT: 1730 mL    Total NET: 15 mL      11 Jun 2021 07:01  -  11 Jun 2021 20:55  --------------------------------------------------------  IN:    dextrose 5% + lactated ringers: 675 mL    Lactated Ringers: 375 mL    Pantoprazole: 140 mL    PRBCs (Packed Red Blood Cells): 300 mL  Total IN: 1490 mL    OUT:    Voided (mL): 640 mL  Total OUT: 640 mL    Total NET: 850 mL              PE:    Constitutional: Healthy M lying in bed in NAD.   Neck: No JVD, trachea midline.   Respiratory: CTA B/L good BS B/L no W/R/R.  Cardiovascular: S1S2+ RRR no M/R/G.  Gastrointestinal: Soft, NTND.  Extremities: No peripheral edema, No cyanosis, clubbing.  Neurological: Awake, conversive, alert, no gross deficits.  Skin: No rashes, warm, moist.    LABS:    CBC Full  -  ( 11 Jun 2021 20:23 )  WBC Count : 8.64 K/uL  RBC Count : 2.99 M/uL  Hemoglobin : 9.4 g/dL  Hematocrit : 27.7 %  Platelet Count - Automated : 139 K/uL  Mean Cell Volume : 92.6 fl  Mean Cell Hemoglobin : 31.4 pg  Mean Cell Hemoglobin Concentration : 33.9 gm/dL  Auto Neutrophil # : x  Auto Lymphocyte # : x  Auto Monocyte # : x  Auto Eosinophil # : x  Auto Basophil # : x  Auto Neutrophil % : x  Auto Lymphocyte % : x  Auto Monocyte % : x  Auto Eosinophil % : x  Auto Basophil % : x    06-11    147<H>  |  116<H>  |  27<H>  ----------------------------<  122<H>  3.9   |  22  |  1.05    Ca    7.5<L>      11 Jun 2021 05:50  Phos  2.7     06-11  Mg     2.2     06-11    TPro  4.6<L>  /  Alb  2.6<L>  /  TBili  0.6  /  DBili  x   /  AST  14  /  ALT  14  /  AlkPhos  26<L>  06-11    PT/INR - ( 10 Juan 2021 04:27 )   PT: 14.2 sec;   INR: 1.18 ratio         PTT - ( 10 Juan 2021 04:27 )  PTT:28.7 sec    CAPILLARY BLOOD GLUCOSE      POCT Blood Glucose.: 140 mg/dL (11 Jun 2021 18:34)  POCT Blood Glucose.: 130 mg/dL (11 Jun 2021 11:23)  POCT Blood Glucose.: 119 mg/dL (11 Jun 2021 06:13)  POCT Blood Glucose.: 163 mg/dL (10 Juan 2021 22:50)    CARDIAC MARKERS ( 10 Juan 2021 04:33 )  .035 ng/mL / x     / x     / x     / x          LIVER FUNCTIONS - ( 11 Jun 2021 05:50 )  Alb: 2.6 g/dL / Pro: 4.6 g/dL / ALK PHOS: 26 U/L / ALT: 14 U/L DA / AST: 14 U/L / GGT: x             Plan:  PPI Drip  NPO  Serial labs   Hold anti-coagulation  GI follow up  SCDs  Hold chemical DVT ppx

## 2021-06-11 NOTE — PROGRESS NOTE ADULT - ASSESSMENT
Pt with UGI bleed likely due to Eliquis.  Multiple duodenal ulcers. Still low Hgb. Will give Venofer.   GI fu.   Rapid AF/Flutter.  Anemia.    D/w PA and nursing in detail  Prognosis guarded.  Pt is critically ill and requires ICU care and monitoring.

## 2021-06-11 NOTE — PROGRESS NOTE ADULT - SUBJECTIVE AND OBJECTIVE BOX
Chief Complaint: Dizziness    Interval Events: Underwent EGD yesterday. Overall improved today.    Review of Systems:  General: No fevers, chills, weight loss or gain  Skin: No rashes, color changes  Cardiovascular: No chest pain, orthopnea  Respiratory: No shortness of breath, cough  Gastrointestinal: No nausea, abdominal pain  Genitourinary: No incontinence, pain with urination  Musculoskeletal: No pain, swelling, decreased range of motion  Neurological: No headache, weakness  Psychiatric: No depression, anxiety  Endocrine: No weight loss or gain, increased thirst  All other systems are comprehensively negative.    Physical Exam:  Vitals:        Vital Signs Last 24 Hrs  T(C): 36.7 (11 Jun 2021 08:19), Max: 36.9 (10 Juan 2021 09:34)  T(F): 98.1 (11 Jun 2021 08:19), Max: 98.4 (10 Juan 2021 09:34)  HR: 78 (11 Jun 2021 07:00) (63 - 115)  BP: 96/58 (11 Jun 2021 07:00) (90/71 - 164/68)  BP(mean): 71 (11 Jun 2021 07:00) (70 - 104)  RR: 22 (11 Jun 2021 07:00) (9 - 27)  SpO2: 100% (11 Jun 2021 07:00) (95% - 100%)  General: NAD  HEENT: MMM  Neck: No JVD, no carotid bruit  Lungs: CTAB  CV: RRR, nl S1/S2, no M/R/G  Abdomen: S/NT/ND, +BS  Extremities: No LE edema, no cyanosis  Neuro: AAOx3, non-focal  Skin: No rash    Labs:                        7.6    8.82  )-----------( 137      ( 11 Jun 2021 05:51 )             22.3     06-11    147<H>  |  116<H>  |  27<H>  ----------------------------<  122<H>  3.9   |  22  |  1.05    Ca    7.5<L>      11 Jun 2021 05:50  Phos  2.7     06-11  Mg     2.2     06-11    TPro  4.6<L>  /  Alb  2.6<L>  /  TBili  0.6  /  DBili  x   /  AST  14  /  ALT  14  /  AlkPhos  26<L>  06-11    CARDIAC MARKERS ( 10 Juan 2021 04:33 )  .035 ng/mL / x     / x     / x     / x          PT/INR - ( 10 Juan 2021 04:27 )   PT: 14.2 sec;   INR: 1.18 ratio         PTT - ( 10 Juan 2021 04:27 )  PTT:28.7 sec    Telemetry: AFL with variable block

## 2021-06-11 NOTE — PROGRESS NOTE ADULT - ASSESSMENT
The patient is a 71 year old male with a history of HL, DM, CVA, atrial fibrillation who is admitted with GI bleed, rapid atrial flutter.    Plan:  - Tachycardia improved  - Monitor hemoglobin  - Continue metoprolol tartrate 5 mg IV q6h  - When taking PO, transition to metoprolol tartrate 25 mg bid  - Hold apixaban - given prior history of CVA, will eventually need to be restarted vs. consideration of [facility] as outpatient  - GI follow-up

## 2021-06-11 NOTE — PROGRESS NOTE ADULT - SUBJECTIVE AND OBJECTIVE BOX
INTERVAL HPI/OVERNIGHT EVENTS:  No new overnight event.  No N/V/D.  Tolerating diet.  no bleeding  Allergies    No Known Allergies    Intolerances    General:  No wt loss, fevers, chills, night sweats, fatigue,   Eyes:  Good vision, no reported pain  ENT:  No sore throat, pain, runny nose, dysphagia  CV:  No pain, palpitations, hypo/hypertension  Resp:  No dyspnea, cough, tachypnea, wheezing  GI:  No pain, No nausea, No vomiting, No diarrhea, No constipation, No weight loss, No fever, No pruritis, No rectal bleeding, No tarry stools, No dysphagia,  :  No pain, bleeding, incontinence, nocturia  Muscle:  No pain, weakness  Neuro:  No weakness, tingling, memory problems  Psych:  No fatigue, insomnia, mood problems, depression  Endocrine:  No polyuria, polydipsia, cold/heat intolerance  Heme:  No petechiae, ecchymosis, easy bruisability  Skin:  No rash, tattoos, scars, edema      PHYSICAL EXAM:   Vital Signs:  Vital Signs Last 24 Hrs  T(C): 36.9 (11 Jun 2021 17:04), Max: 36.9 (11 Jun 2021 05:21)  T(F): 98.4 (11 Jun 2021 17:04), Max: 98.4 (11 Jun 2021 05:21)  HR: 83 (11 Jun 2021 17:04) (63 - 115)  BP: 125/64 (11 Jun 2021 17:04) (90/71 - 125/64)  BP(mean): 83 (11 Jun 2021 17:04) (70 - 94)  RR: 19 (11 Jun 2021 17:04) (9 - 25)  SpO2: 100% (11 Jun 2021 17:04) (97% - 100%)  Daily     Daily I&O's Summary    10 Juan 2021 07:01  -  11 Jun 2021 07:00  --------------------------------------------------------  IN: 1745 mL / OUT: 1730 mL / NET: 15 mL    11 Jun 2021 07:01  -  11 Jun 2021 17:37  --------------------------------------------------------  IN: 1235 mL / OUT: 340 mL / NET: 895 mL        GENERAL:  Appears stated age, well-groomed, well-nourished, no distress  HEENT:  NC/AT,  conjunctivae clear and pink, no thyromegaly, nodules, adenopathy, no JVD, sclera -anicteric  CHEST:  Full & symmetric excursion, no increased effort, breath sounds clear  HEART:  Regular rhythm, S1, S2, no murmur/rub/S3/S4, no abdominal bruit, no edema  ABDOMEN:  Soft, non-tender, non-distended, normoactive bowel sounds,  no masses ,no hepato-splenomegaly, no signs of chronic liver disease  EXTEREMITIES:  no cyanosis,clubbing or edema  SKIN:  No rash/erythema/ecchymoses/petechiae/wounds/abscess/warm/dry  NEURO:  Alert, oriented, no asterixis, no tremor, no encephalopathy      LABS:                        7.2    7.80  )-----------( 132      ( 11 Jun 2021 13:03 )             21.4     06-11    147<H>  |  116<H>  |  27<H>  ----------------------------<  122<H>  3.9   |  22  |  1.05    Ca    7.5<L>      11 Jun 2021 05:50  Phos  2.7     06-11  Mg     2.2     06-11    TPro  4.6<L>  /  Alb  2.6<L>  /  TBili  0.6  /  DBili  x   /  AST  14  /  ALT  14  /  AlkPhos  26<L>  06-11    PT/INR - ( 10 Juan 2021 04:27 )   PT: 14.2 sec;   INR: 1.18 ratio         PTT - ( 10 Juan 2021 04:27 )  PTT:28.7 sec    amylase   lipase  RADIOLOGY & ADDITIONAL TESTS:

## 2021-06-12 LAB
ANION GAP SERPL CALC-SCNC: 8 MMOL/L — SIGNIFICANT CHANGE UP (ref 5–17)
BUN SERPL-MCNC: 17 MG/DL — SIGNIFICANT CHANGE UP (ref 7–23)
CALCIUM SERPL-MCNC: 7.7 MG/DL — LOW (ref 8.4–10.5)
CHLORIDE SERPL-SCNC: 112 MMOL/L — HIGH (ref 96–108)
CO2 SERPL-SCNC: 22 MMOL/L — SIGNIFICANT CHANGE UP (ref 22–31)
CREAT SERPL-MCNC: 0.96 MG/DL — SIGNIFICANT CHANGE UP (ref 0.5–1.3)
GLUCOSE SERPL-MCNC: 154 MG/DL — HIGH (ref 70–99)
MAGNESIUM SERPL-MCNC: 2.1 MG/DL — SIGNIFICANT CHANGE UP (ref 1.6–2.6)
PHOSPHATE SERPL-MCNC: 3.1 MG/DL — SIGNIFICANT CHANGE UP (ref 2.5–4.5)
POTASSIUM SERPL-MCNC: 3.4 MMOL/L — LOW (ref 3.5–5.3)
POTASSIUM SERPL-SCNC: 3.4 MMOL/L — LOW (ref 3.5–5.3)
SODIUM SERPL-SCNC: 142 MMOL/L — SIGNIFICANT CHANGE UP (ref 135–145)
VIT B12 SERPL-MCNC: 250 PG/ML — SIGNIFICANT CHANGE UP (ref 232–1245)

## 2021-06-12 PROCEDURE — 99233 SBSQ HOSP IP/OBS HIGH 50: CPT

## 2021-06-12 RX ORDER — INSULIN LISPRO 100/ML
VIAL (ML) SUBCUTANEOUS
Refills: 0 | Status: DISCONTINUED | OUTPATIENT
Start: 2021-06-12 | End: 2021-06-14

## 2021-06-12 RX ORDER — LANOLIN ALCOHOL/MO/W.PET/CERES
6 CREAM (GRAM) TOPICAL ONCE
Refills: 0 | Status: COMPLETED | OUTPATIENT
Start: 2021-06-12 | End: 2021-06-12

## 2021-06-12 RX ORDER — POTASSIUM CHLORIDE 20 MEQ
10 PACKET (EA) ORAL
Refills: 0 | Status: COMPLETED | OUTPATIENT
Start: 2021-06-12 | End: 2021-06-12

## 2021-06-12 RX ORDER — METOPROLOL TARTRATE 50 MG
12.5 TABLET ORAL
Refills: 0 | Status: DISCONTINUED | OUTPATIENT
Start: 2021-06-12 | End: 2021-06-14

## 2021-06-12 RX ORDER — POTASSIUM CHLORIDE 20 MEQ
20 PACKET (EA) ORAL ONCE
Refills: 0 | Status: COMPLETED | OUTPATIENT
Start: 2021-06-12 | End: 2021-06-12

## 2021-06-12 RX ORDER — ACETAMINOPHEN 500 MG
1000 TABLET ORAL ONCE
Refills: 0 | Status: COMPLETED | OUTPATIENT
Start: 2021-06-12 | End: 2021-06-12

## 2021-06-12 RX ADMIN — Medication 1 GRAM(S): at 23:00

## 2021-06-12 RX ADMIN — Medication 1 GRAM(S): at 06:05

## 2021-06-12 RX ADMIN — Medication 1000 MILLIGRAM(S): at 10:45

## 2021-06-12 RX ADMIN — Medication 6 MILLIGRAM(S): at 22:49

## 2021-06-12 RX ADMIN — SODIUM CHLORIDE 50 MILLILITER(S): 9 INJECTION, SOLUTION INTRAVENOUS at 10:15

## 2021-06-12 RX ADMIN — PANTOPRAZOLE SODIUM 10 MG/HR: 20 TABLET, DELAYED RELEASE ORAL at 22:07

## 2021-06-12 RX ADMIN — PANTOPRAZOLE SODIUM 10 MG/HR: 20 TABLET, DELAYED RELEASE ORAL at 10:15

## 2021-06-12 RX ADMIN — Medication 1: at 06:06

## 2021-06-12 RX ADMIN — ATORVASTATIN CALCIUM 40 MILLIGRAM(S): 80 TABLET, FILM COATED ORAL at 22:07

## 2021-06-12 RX ADMIN — Medication 100 MILLIEQUIVALENT(S): at 11:25

## 2021-06-12 RX ADMIN — Medication 12.5 MILLIGRAM(S): at 17:00

## 2021-06-12 RX ADMIN — Medication 1 GRAM(S): at 17:00

## 2021-06-12 RX ADMIN — Medication 1: at 12:24

## 2021-06-12 RX ADMIN — Medication 20 MILLIEQUIVALENT(S): at 10:15

## 2021-06-12 RX ADMIN — Medication 400 MILLIGRAM(S): at 10:15

## 2021-06-12 RX ADMIN — Medication 100 MILLIEQUIVALENT(S): at 10:15

## 2021-06-12 RX ADMIN — Medication 100 MILLIEQUIVALENT(S): at 12:26

## 2021-06-12 RX ADMIN — Medication 1 GRAM(S): at 12:25

## 2021-06-12 NOTE — PROGRESS NOTE ADULT - SUBJECTIVE AND OBJECTIVE BOX
Patient seen and examined at bedside  no acute overnight events  patient received 1 additional unit of prbc yesterday due to hb of 7.2  today patient feels well diet advanced to clears  Wife at bedside  Patient asking about benefit of watchman procedure  Review of Systems:  General:denies fever chills, headache, weakness  HEENT: denies blurry vision,diffculty swallowing, difficulty hearing, tinnitus  Cardiovascular: denies chest pain  ,palpitations  Pulmonary:denies shortness of breath, cough, wheezing, hemoptysis  Gastrointestinal: denies abdominal pain, constipation, diarrhea,nausea , vomiting, hematochezia  : denies hematuria, dysuria, or incontinence  Neurological: denies weakness, numbness , tingling, dizziness, tremors  MSK: denies muscle pain, difficulty ambulating, swelling, back pain  skin: denies skin rash, itching, burning, or  skin lesions  Psychiatrical: denies mood disturbances, anxierty, feeling depressed, depression , or difficulty sleeping    Objective:  Vitals  checked from 6-12: benign    Physical Exam:  General: comfortable, no acute distress, well nourished  HEENT: Atraumatic, no LAD, trachea midline, PERRLA  Cardiovascular: normal s1s2, no murmurs, gallops or fricition rubs  Pulmonary: clear to ausculation Bilaterally, no wheezing , rhonchi  Gastrointestinal: soft non tender non distended, no masses felt, no organomegally  Muscloskeletal: no lower extremity edema, intact bilateral lower extremity pulses  Neurological: CN II-12 intact. No focal weakness  Psychiatrical: normal mood, cooperative  SKIN: no rash, lesions or ulcers    Labs:  reviewed.  ho Hb checked all day; requested by nursing.                Active Medications  MEDICATIONS  (STANDING):  atorvastatin 40 milliGRAM(s) Oral at bedtime  dextrose 40% Gel 15 Gram(s) Oral once  dextrose 5%. 1000 milliLiter(s) (50 mL/Hr) IV Continuous <Continuous>  dextrose 5%. 1000 milliLiter(s) (100 mL/Hr) IV Continuous <Continuous>  dextrose 50% Injectable 25 Gram(s) IV Push once  dextrose 50% Injectable 12.5 Gram(s) IV Push once  dextrose 50% Injectable 25 Gram(s) IV Push once  glucagon  Injectable 1 milliGRAM(s) IntraMuscular once  insulin lispro (ADMELOG) corrective regimen sliding scale   SubCutaneous Before meals and at bedtime  metoprolol tartrate 12.5 milliGRAM(s) Oral two times a day  pantoprazole Infusion 8 mG/Hr (10 mL/Hr) IV Continuous <Continuous>  sucralfate suspension 1 Gram(s) Oral every 6 hours    MEDICATIONS  (PRN):  traMADol 25 milliGRAM(s) Oral every 8 hours PRN Moderate Pain (4 - 6)

## 2021-06-12 NOTE — PROGRESS NOTE ADULT - ASSESSMENT
PLAn for Tonight:  -trend Hb, transfuse Hb <7  -maintain rate control on regimen of lorpessor  -on protonix drip as well as sucralfate, would consider switch to BID pushes of protnix of IV access is an issue   -on D5LR while NPO, woudl advance diet in discussion with GI service, once taking adequate Po stop fluids   -AM labs with K+ of 3.4, was repleted. F/U repeat in AM  -glycemic control with ISS  -continue to hold AA/C given recent GI bleed, re-start in discussion with GI/cards team

## 2021-06-12 NOTE — PROGRESS NOTE ADULT - SUBJECTIVE AND OBJECTIVE BOX
PULMONARY/CRITICAL CARE    DOS 6/12/21  No bm. BP better. HR better.  Not eating;  Not ambulating.   Had EGD--multiple duodenal ulcers.     Patient is a 71y old  Male who presents with a chief complaint of GI bleed/dizziness (09 Jun 2021 20:53)  71M with afib on eliquis, DM2 not on insulin, hyperlipidemia, hx of CVA with no residuals, who presents with dizziness/GI bleed.  Patient reports having dark stools starting yesterday morning and then started to have dizziness when standing.  Also complained of some light-headedness when sitting.  However no LOC or headaches.  Denies any abdominal pain.  Had an episode of nausea and one episode of non-bloody non-bilious vomiting.  No chest pain, SOB, palpitations, weakness.  No recent illnesses such as fevers, cough.  Has been having some unintentional weight loss of 10lbs in the past month.  Did not have any history of GI bleed in the past.  Had an endoscopy about 4 years ago for dysphagia but was told he had a normal EGD (no ulcers).  No hx of colonoscopy.  No known family hx of GI issues or bleeding.   Denies NSAIDS, ASA    In the ED, patient's triage vitals were /71   RR 20, 97%  T 97.5F.  Found to have a hgb of 8.5 (last known hgb was 14.4 in 10/2020) and leukocytosis of 13.7.  Patient's EKG showed afib with a HR of 137.  Patient was ordered and transfused 1u of pRBC.  ICU was consulted for anemia and tachycardia.  GI (Dr. Girard) was also consulted.  Patient feels stable and asymptomatic as long as he does not move.      Pt. still has low Hgb despite transfusion and ivf. Still in rapid AF/flutter.  BRIEF HOSPITAL COURSE: ***    Events last 24 hours: ***    PAST MEDICAL & SURGICAL HISTORY:  GERD (gastroesophageal reflux disease)    Type 2 diabetes mellitus    Hyperlipidemia    Cerebrovascular accident (CVA)    Adhesive capsulitis of right shoulder    H/O pilonidal cyst      Allergies    No Known Allergies    Intolerances      FAMILY HISTORY/ social: ; no  cigs; drinks 1 beer daily  Family hx of lung cancer (Father, Mother, Sibling)        Review of Systems:  CONSTITUTIONAL: No fever, chills, or fatigue  EYES: No eye pain, visual disturbances, or discharge  ENMT:  No difficulty hearing, tinnitus, vertigo; No sinus or throat pain  NECK: No pain or stiffness  RESPIRATORY: No cough, wheezing, chills or hemoptysis; No shortness of breath  CARDIOVASCULAR: had chest pain, palpitations, dizziness,   GASTROINTESTINAL: No abdominal or epigastric pain. No nausea, vomiting, or hematemesis; No diarrhea or constipation. has melena   GENITOURINARY: No dysuria, frequency, hematuria, or incontinence  NEUROLOGICAL: No headaches, memory loss, loss of strength, numbness, or tremors  SKIN: No itching, burning, rashes, or lesions   MUSCULOSKELETAL: No joint pain or swelling; No muscle, back, or extremity pain  PSYCHIATRIC: No depression, anxiety, mood swings, or difficulty sleeping      Medications:    metoprolol tartrate Injectable 5 milliGRAM(s) IV Push every 6 hours PRN            pantoprazole Infusion 8 mG/Hr IV Continuous <Continuous>      atorvastatin 40 milliGRAM(s) Oral at bedtime  dextrose 40% Gel 15 Gram(s) Oral once  dextrose 50% Injectable 25 Gram(s) IV Push once  dextrose 50% Injectable 12.5 Gram(s) IV Push once  dextrose 50% Injectable 25 Gram(s) IV Push once  glucagon  Injectable 1 milliGRAM(s) IntraMuscular once  insulin lispro (ADMELOG) corrective regimen sliding scale   SubCutaneous three times a day before meals  insulin lispro (ADMELOG) corrective regimen sliding scale   SubCutaneous at bedtime    dextrose 5%. 1000 milliLiter(s) IV Continuous <Continuous>  dextrose 5%. 1000 milliLiter(s) IV Continuous <Continuous>                ICU Vital Signs Last 24 Hrs  T(C): 36.8 (10 Juan 2021 05:00), Max: 36.9 (10 Juan 2021 04:00)  T(F): 98.3 (10 Juan 2021 05:00), Max: 98.4 (10 Juan 2021 04:00)  HR: 122 (10 Juan 2021 06:34) (106 - 145)  BP: 65/53 (10 Juan 2021 06:34) (65/53 - 128/87)  BP(mean): 59 (10 Juan 2021 06:34) (59 - 100)  ABP: --  ABP(mean): --  RR: 19 (10 Juan 2021 06:34) (11 - 32)  SpO2: 100% (10 Juan 2021 06:34) (97% - 100%)    Vital Signs Last 24 Hrs  T(C): 36.8 (10 Juan 2021 05:00), Max: 36.9 (10 Juan 2021 04:00)  T(F): 98.3 (10 Juan 2021 05:00), Max: 98.4 (10 Juan 2021 04:00)  HR: 122 (10 Juan 2021 06:34) (106 - 145)  BP: 65/53 (10 Juan 2021 06:34) (65/53 - 128/87)  BP(mean): 59 (10 Juan 2021 06:34) (59 - 100)  RR: 19 (10 Juan 2021 06:34) (11 - 32)  SpO2: 100% (10 Juan 2021 06:34) (97% - 100%)        I&O's Detail    09 Jun 2021 07:01  -  10 Juan 2021 07:00  --------------------------------------------------------  IN:  Total IN: 0 mL    OUT:    Voided (mL): 800 mL  Total OUT: 800 mL    Total NET: -800 mL            LABS:                        7.2    10.60 )-----------( 163      ( 10 Juan 2021 04:27 )             22.1     06-10    139  |  111<H>  |  48<H>  ----------------------------<  175<H>  4.6   |  18<L>  |  0.93    Ca    7.9<L>      10 Juan 2021 04:27  Phos  2.7     06-10  Mg     1.7     06-10    TPro  4.5<L>  /  Alb  2.5<L>  /  TBili  0.5  /  DBili  x   /  AST  9<L>  /  ALT  24  /  AlkPhos  25<L>  06-10      CARDIAC MARKERS ( 10 Juan 2021 04:33 )  .035 ng/mL / x     / x     / x     / x          CAPILLARY BLOOD GLUCOSE      POCT Blood Glucose.: 226 mg/dL (09 Jun 2021 22:31)    PT/INR - ( 10 Juan 2021 04:27 )   PT: 14.2 sec;   INR: 1.18 ratio         PTT - ( 10 Juan 2021 04:27 )  PTT:28.7 sec    CULTURES:      Physical Examination:    General: No acute distress.  sitting up comfortably in bed    HEENT: Pupils equal, reactive to light.  Symmetric.    PULM: Clear to auscultation bilaterally, no wheeze rhonchi rales, good excursion no change percussion    CVS: irregular rate and rhythm, 1/6 sys murmurs, rubs, or gallops    ABD: Soft, nondistended, nontender, normoactive bowel sounds, no masses    EXT: No edema, nontender calves    SKIN: Warm and well perfused, no rashes noted.    NEURO: Alert, oriented, interactive, nonfocal    RADIOLOGY: ***    General:  · Primary Surgeon	Mitchell  · Co-Surgeon	Shaji  · Assistant(s)	N/A  · Type of Anesthesia	MAC  · Anesthesiologist	Jayshree    Pre-Op Diagnosis, Post-Op Diagnosis and Procedure:   Pre-Op, Post-Op and Procedure Selector:  ·  PRE-OP DIAGNOSIS:  Melena 10-Juan-2021 12:21:58  Miladys Medrano.  ·  PROCEDURES:  UGI endoscopy 10-Juan-2021 12:22:08 see full operative report for detail  1. Active bleeding seen in the duodenal sweep with multiple duodenal ulcers, s/p APC and Bipolar cautery with good control of bleeding by the end of the procedure  2. gastritis  .  Rec:  1. NPO  2. PPI gtt  3. hold anti-plt agent  4. CBC q12 until stable  5. transfuse if Hgb <7   Miladys Medrano.      Operative Findings:  · Operative Findings	see above    Specimens/Blood Loss/IV/Output/Protocol/VTE:   Specimens/Blood Loss/IV/Output/Protocol/VTE:  · Specimens	0  · Estimated Blood Loss	0 milliLiter(s)      Electronic Signatures:  Miladys Medrano)  (Signed 10-Juan-2021 12:23)  	Authored: General, Pre-Op Diagnosis, Post-Op Diagnosis and Procedure, Operative Findings, Specimens/Blood Loss/IV/Output/Protocol/VTE      Last Updated: 10-Juan-2021 12:23 by Miladys Medrano)

## 2021-06-12 NOTE — PROGRESS NOTE ADULT - SUBJECTIVE AND OBJECTIVE BOX
Chief Complaint: Dizziness    Interval Events: No events overnight.    Review of Systems:  General: No fevers, chills, weight loss or gain  Skin: No rashes, color changes  Cardiovascular: No chest pain, orthopnea  Respiratory: No shortness of breath, cough  Gastrointestinal: No nausea, abdominal pain  Genitourinary: No incontinence, pain with urination  Musculoskeletal: No pain, swelling, decreased range of motion  Neurological: No headache, weakness  Psychiatric: No depression, anxiety  Endocrine: No weight loss or gain, increased thirst  All other systems are comprehensively negative.    Physical Exam:  Vital Signs Last 24 Hrs  T(C): 36.9 (12 Jun 2021 04:06), Max: 36.9 (11 Jun 2021 17:04)  T(F): 98.4 (12 Jun 2021 04:06), Max: 98.4 (11 Jun 2021 17:04)  HR: 89 (12 Jun 2021 08:00) (65 - 99)  BP: 107/73 (12 Jun 2021 08:00) (92/80 - 151/111)  BP(mean): 78 (12 Jun 2021 08:00) (73 - 122)  RR: 18 (12 Jun 2021 08:00) (14 - 28)  SpO2: 100% (12 Jun 2021 08:00) (98% - 100%)  General: NAD  HEENT: MMM  Neck: No JVD, no carotid bruit  Lungs: CTAB  CV: RRR, nl S1/S2, no M/R/G  Abdomen: S/NT/ND, +BS  Extremities: No LE edema, no cyanosis  Neuro: AAOx3, non-focal  Skin: No rash    Labs:             06-12    142  |  112<H>  |  17  ----------------------------<  154<H>  3.4<L>   |  22  |  0.96    Ca    7.7<L>      12 Jun 2021 06:40  Phos  3.1     06-12  Mg     2.1     06-12    TPro  4.6<L>  /  Alb  2.6<L>  /  TBili  0.6  /  DBili  x   /  AST  14  /  ALT  14  /  AlkPhos  26<L>  06-11                        9.4    8.64  )-----------( 139      ( 11 Jun 2021 20:23 )             27.7       Telemetry: AFL with variable block

## 2021-06-12 NOTE — PROGRESS NOTE ADULT - ASSESSMENT
71M with afib on eliquis, DM2 not on insulin, hyperlipidemia, hx of CVA with no residuals, who presents with dizziness/GI bleed.      Severe Acute blood loss anemia likely due to UGI bleed/melena   -s/p 4 units of PRBC   -s/p Kcentra  -GI /critical care consulted  -s/p emergent EGD. active bleed found in duodenal sweep + gastritis s/p APC cautery  -NPO  -on protonix drip  -Hb 6/11 7.6  plan  advance diet: activated clears --> fulls  holding off on CTA now  maintain IV access  transfuse PRN (hgb <7 or symptomatic)  active TS    hypotension  likely due to lopressor and anemia  will check CBC stat and initate prbc now      Dizziness/Nausea secondary to Upper GI Bleed  Neurology consulted :low suspicion for CVA    Tachycardia/aflutter -  likely rapid aflutter due to abrupt hypovolemia  Cardiology consulted  s/p diltizem changed to lopressor IV  plan  c/w lopressor  c/w tele    Unintentional weight loss  - consider CT C/A/P after GI bleeding has been stablized    DM2   - keep NPO  - start low dose coverage scale with FS q6h while NPO  - check A1c  - diabetic diet when can able to take PO    HLD  - cont with atorvastatin 40mg daily    Preventive measures  - hold AC 2/2 GI bleed    Patient inquisitive of watchman: waiting for CBC.

## 2021-06-12 NOTE — PROGRESS NOTE ADULT - SUBJECTIVE AND OBJECTIVE BOX
INTERVAL HPI/OVERNIGHT EVENTS:  No new overnight event.  No N/V/D.  Tolerating diet.no melena  hgb is stable    Allergies    No Known Allergies    Intolerances    General:  No wt loss, fevers, chills, night sweats, fatigue,   Eyes:  Good vision, no reported pain  ENT:  No sore throat, pain, runny nose, dysphagia  CV:  No pain, palpitations, hypo/hypertension  Resp:  No dyspnea, cough, tachypnea, wheezing  GI:  No pain, No nausea, No vomiting, No diarrhea, No constipation, No weight loss, No fever, No pruritis, No rectal bleeding, No tarry stools, No dysphagia,  :  No pain, bleeding, incontinence, nocturia  Muscle:  No pain, weakness  Neuro:  No weakness, tingling, memory problems  Psych:  No fatigue, insomnia, mood problems, depression  Endocrine:  No polyuria, polydipsia, cold/heat intolerance  Heme:  No petechiae, ecchymosis, easy bruisability  Skin:  No rash, tattoos, scars, edema      PHYSICAL EXAM:   Vital Signs:  Vital Signs Last 24 Hrs  T(C): 36.9 (2021 15:42), Max: 36.9 (2021 17:04)  T(F): 98.5 (2021 15:42), Max: 98.5 (2021 15:42)  HR: 82 (2021 16:00) (65 - 98)  BP: 131/86 (2021 16:00) (102/71 - 162/80)  BP(mean): 100 (2021 16:00) (73 - 122)  RR: 19 (2021 16:00) (15 - 28)  SpO2: 100% (2021 16:00) (98% - 100%)  Daily     Daily Weight in k.1 (2021 04:06)I&O's Summary    2021 07:01  -  2021 07:00  --------------------------------------------------------  IN: 2340 mL / OUT: 890 mL / NET: 1450 mL    2021 07:01  -  2021 16:10  --------------------------------------------------------  IN: 1255 mL / OUT: 700 mL / NET: 555 mL        GENERAL:  Appears stated age, well-groomed, well-nourished, no distress  HEENT:  NC/AT,  conjunctivae clear and pink, no thyromegaly, nodules, adenopathy, no JVD, sclera -anicteric  CHEST:  Full & symmetric excursion, no increased effort, breath sounds clear  HEART:  Regular rhythm, S1, S2, no murmur/rub/S3/S4, no abdominal bruit, no edema  ABDOMEN:  Soft, non-tender, non-distended, normoactive bowel sounds,  no masses ,no hepato-splenomegaly, no signs of chronic liver disease  EXTEREMITIES:  no cyanosis,clubbing or edema  SKIN:  No rash/erythema/ecchymoses/petechiae/wounds/abscess/warm/dry  NEURO:  Alert, oriented, no asterixis, no tremor, no encephalopathy      LABS:                        9.4    8.64  )-----------( 139      ( 2021 20:23 )             27.7     06-12    142  |  112<H>  |  17  ----------------------------<  154<H>  3.4<L>   |  22  |  0.96    Ca    7.7<L>      2021 06:40  Phos  3.1     06-12  Mg     2.1     06-12    TPro  4.6<L>  /  Alb  2.6<L>  /  TBili  0.6  /  DBili  x   /  AST  14  /  ALT  14  /  AlkPhos  26<L>  06-11        amylase   lipase  RADIOLOGY & ADDITIONAL TESTS:

## 2021-06-12 NOTE — PROGRESS NOTE ADULT - SUBJECTIVE AND OBJECTIVE BOX
Patient is a 71y old  Male who presents with a chief complaint of GI bleed/dizziness (12 Jun 2021 20:11)      BRIEF HOSPITAL COURSE:     71M admitted to [facility] with UGIB ( found to have duodenal ulcers on EGD S/P cautery), ABLA, S/P 4 units of PRBC transfusion, was on elqiuis prior to admission, and while admitted develoepd issue with rapid a flutter.    Events last 24 hours:   -issues seem to be resolved at this time  -Hb has been stable S/P EGD with cautery, no further bleeding  -he is currently rate controlled  -tonight he offers no complaints     PAST MEDICAL & SURGICAL HISTORY:  GERD (gastroesophageal reflux disease)    Type 2 diabetes mellitus    Hyperlipidemia    Cerebrovascular accident (CVA)    Adhesive capsulitis of right shoulder    H/O pilonidal cyst        Review of Systems:  CONSTITUTIONAL: No fever, chills, or fatigue  EYES: No eye pain, visual disturbances, or discharge  ENMT:  No difficulty hearing, tinnitus, vertigo; No sinus or throat pain  NECK: No pain or stiffness  RESPIRATORY: No cough, wheezing, chills or hemoptysis; No shortness of breath  CARDIOVASCULAR: No chest pain, palpitations, dizziness, or leg swelling  GASTROINTESTINAL: No abdominal or epigastric pain. No nausea, vomiting, or hematemesis; No diarrhea or constipation. No melena or hematochezia.  GENITOURINARY: No dysuria, frequency, hematuria, or incontinence  NEUROLOGICAL: No headaches, memory loss, loss of strength, numbness, or tremors  SKIN: No itching, burning, rashes, or lesions   MUSCULOSKELETAL: No joint pain or swelling; No muscle, back, or extremity pain  PSYCHIATRIC: No depression, anxiety, mood swings, or difficulty sleeping      Medications:    metoprolol tartrate 12.5 milliGRAM(s) Oral two times a day            pantoprazole Infusion 8 mG/Hr IV Continuous <Continuous>  sucralfate suspension 1 Gram(s) Oral every 6 hours      atorvastatin 40 milliGRAM(s) Oral at bedtime  dextrose 40% Gel 15 Gram(s) Oral once  dextrose 50% Injectable 25 Gram(s) IV Push once  dextrose 50% Injectable 12.5 Gram(s) IV Push once  dextrose 50% Injectable 25 Gram(s) IV Push once  glucagon  Injectable 1 milliGRAM(s) IntraMuscular once  insulin lispro (ADMELOG) corrective regimen sliding scale   SubCutaneous Before meals and at bedtime    dextrose 5% + lactated ringers. 1000 milliLiter(s) IV Continuous <Continuous>  dextrose 5%. 1000 milliLiter(s) IV Continuous <Continuous>  dextrose 5%. 1000 milliLiter(s) IV Continuous <Continuous>                ICU Vital Signs Last 24 Hrs  T(C): 37.1 (12 Jun 2021 19:24), Max: 37.1 (12 Jun 2021 19:24)  T(F): 98.8 (12 Jun 2021 19:24), Max: 98.8 (12 Jun 2021 19:24)  HR: 66 (12 Jun 2021 20:00) (65 - 97)  BP: 143/83 (12 Jun 2021 20:00) (102/71 - 162/80)  BP(mean): 102 (12 Jun 2021 20:00) (73 - 122)  RR: 18 (12 Jun 2021 20:00) (15 - 28)  SpO2: 100% (12 Jun 2021 20:00) (98% - 100%)          I&O's Detail    11 Jun 2021 07:01  -  12 Jun 2021 07:00  --------------------------------------------------------  IN:    dextrose 5% + lactated ringers: 1425 mL    Lactated Ringers: 375 mL    Pantoprazole: 240 mL    PRBCs (Packed Red Blood Cells): 300 mL  Total IN: 2340 mL    OUT:    Estimated Blood Loss (mL): 0 mL    Voided (mL): 890 mL  Total OUT: 890 mL    Total NET: 1450 mL      12 Jun 2021 07:01  -  12 Jun 2021 20:48  --------------------------------------------------------  IN:    dextrose 5% + lactated ringers: 775 mL    IV PiggyBack: 400 mL    Oral Fluid: 300 mL    Pantoprazole: 140 mL  Total IN: 1615 mL    OUT:    Voided (mL): 1300 mL  Total OUT: 1300 mL    Total NET: 315 mL            LABS:                        9.4    8.64  )-----------( 139      ( 11 Jun 2021 20:23 )             27.7     06-12    142  |  112<H>  |  17  ----------------------------<  154<H>  3.4<L>   |  22  |  0.96    Ca    7.7<L>      12 Jun 2021 06:40  Phos  3.1     06-12  Mg     2.1     06-12    TPro  4.6<L>  /  Alb  2.6<L>  /  TBili  0.6  /  DBili  x   /  AST  14  /  ALT  14  /  AlkPhos  26<L>  06-11          CAPILLARY BLOOD GLUCOSE      POCT Blood Glucose.: 130 mg/dL (12 Jun 2021 16:34)        CULTURES:      Physical Examination:    General: No acute distress.  Alert, oriented, interactive, nonfocal    HEENT: Pupils equal, reactive to light.  Symmetric.    PULM: Clear to auscultation bilaterally, no significant sputum production    CVS: (+) S1/S2, irregularly irregular, no MRG     ABD: Soft, nondistended, nontender, normoactive bowel sounds, no masses    EXT: No edema, nontender    SKIN: Warm and well perfused, no rashes noted.

## 2021-06-12 NOTE — PROGRESS NOTE ADULT - SUBJECTIVE AND OBJECTIVE BOX
Neurology follow up note    AMY BREWER71yMale      Interval History:    Patient feels ok no new complaints.    MEDICATIONS    atorvastatin 40 milliGRAM(s) Oral at bedtime  dextrose 40% Gel 15 Gram(s) Oral once  dextrose 5% + lactated ringers. 1000 milliLiter(s) IV Continuous <Continuous>  dextrose 5%. 1000 milliLiter(s) IV Continuous <Continuous>  dextrose 5%. 1000 milliLiter(s) IV Continuous <Continuous>  dextrose 50% Injectable 25 Gram(s) IV Push once  dextrose 50% Injectable 12.5 Gram(s) IV Push once  dextrose 50% Injectable 25 Gram(s) IV Push once  glucagon  Injectable 1 milliGRAM(s) IntraMuscular once  insulin lispro (ADMELOG) corrective regimen sliding scale   SubCutaneous every 6 hours  metoprolol tartrate Injectable 5 milliGRAM(s) IV Push every 6 hours  pantoprazole Infusion 8 mG/Hr IV Continuous <Continuous>  potassium chloride    Tablet ER 20 milliEquivalent(s) Oral once  sucralfate suspension 1 Gram(s) Oral every 6 hours      Allergies    No Known Allergies    Intolerances            Vital Signs Last 24 Hrs  T(C): 36.9 (12 Jun 2021 04:06), Max: 36.9 (11 Jun 2021 17:04)  T(F): 98.4 (12 Jun 2021 04:06), Max: 98.4 (11 Jun 2021 17:04)  HR: 72 (12 Jun 2021 06:00) (65 - 99)  BP: 123/67 (12 Jun 2021 06:00) (92/80 - 151/111)  BP(mean): 80 (12 Jun 2021 06:00) (73 - 122)  RR: 18 (12 Jun 2021 06:00) (14 - 28)  SpO2: 100% (12 Jun 2021 06:00) (98% - 100%)      REVIEW OF SYSTEMS:  Constitutional:  The patient denies fever, chills, or night sweats.  Head:  No headache.  Eyes:  No double vision or blurry vision.  Ears:  No ringing in the ears.  Neck:  No neck pain.  Respiratory:  No shortness of breath.  Cardiovascular:  No chest pain.  Abdomen:  No nausea, vomiting, or abdominal pain.  Extremities/Neurological:  No numbness or tingling.  Musculoskeletal:  No joint pain.    PHYSICAL EXAMINATION:   HEENT:  Head:  Normocephalic, atraumatic.  Eyes:  No scleral icterus.  Ears:  Hearing bilaterally intact.  NECK:  Supple.  RESPIRATORY:  Good air entry bilaterally.  CARDIOVASCULAR:  S1 and S2 heard.  ABDOMEN:  Soft and nontender.  EXTREMITIES:  No clubbing or cyanosis was noted.      NEUROLOGIC:  The patient is awake, alert, and oriented x3.  Extraocular movements were intact.  Pupils were equal, round, and reactive bilaterally 3 mm to 2 mm.  Speech was fluent.  Smile was symmetric.  Motor:  Bilateral upper were 5/5, does have slightly decreased range of motion of the right shoulder, but has a history of injury there.  Bilateral lower extremities were 4+/5.  Sensory:  Bilateral upper and lower intact to light touch.               LABS:  CBC Full  -  ( 11 Jun 2021 20:23 )  WBC Count : 8.64 K/uL  RBC Count : 2.99 M/uL  Hemoglobin : 9.4 g/dL  Hematocrit : 27.7 %  Platelet Count - Automated : 139 K/uL  Mean Cell Volume : 92.6 fl  Mean Cell Hemoglobin : 31.4 pg  Mean Cell Hemoglobin Concentration : 33.9 gm/dL  Auto Neutrophil # : x  Auto Lymphocyte # : x  Auto Monocyte # : x  Auto Eosinophil # : x  Auto Basophil # : x  Auto Neutrophil % : x  Auto Lymphocyte % : x  Auto Monocyte % : x  Auto Eosinophil % : x  Auto Basophil % : x      06-12    142  |  112<H>  |  17  ----------------------------<  154<H>  3.4<L>   |  22  |  0.96    Ca    7.7<L>      12 Jun 2021 06:40  Phos  3.1     06-12  Mg     2.1     06-12    TPro  4.6<L>  /  Alb  2.6<L>  /  TBili  0.6  /  DBili  x   /  AST  14  /  ALT  14  /  AlkPhos  26<L>  06-11    Hemoglobin A1C:     LIVER FUNCTIONS - ( 11 Jun 2021 05:50 )  Alb: 2.6 g/dL / Pro: 4.6 g/dL / ALK PHOS: 26 U/L / ALT: 14 U/L DA / AST: 14 U/L / GGT: x           Vitamin B12         RADIOLOGY        ANALYSIS AND PLAN:  This is a 71-year-old with episodes of dizziness, ataxia, feeling weak all over.  The clinical impression is most likely presyncopal event leading to cerebral hypoperfusion secondary to underlying volume depletion with possibly GI issues, suspect less likely this was a TIA or a subclinical seizure event.  Telemetry evaluation.  Please maintain systolic blood pressure greater than 100 to help maintain cerebral perfusion.  For diabetes, strict control of blood sugars.  For history of high cholesterol, continue the patient on statin.  monitor H/H as needed     Greater than 40 minutes of time was spent with the patient, plan of care, reviewing data, speaking to the multidisciplinary healthcare team with greater than 50% of that time in counseling and care coordination.

## 2021-06-12 NOTE — PROGRESS NOTE ADULT - ASSESSMENT
Pt with UGI bleed likely due to Eliquis.  Multiple duodenal ulcers.   Improved Hgb.   Ambulate  GI fu. Can advance diet.   Rapid AF/Flutter.  Anemia.    D/w PA and nursing in detail  Prognosis guarded.  Pt is critically ill and requires ICU care and monitoring.

## 2021-06-13 ENCOUNTER — TRANSCRIPTION ENCOUNTER (OUTPATIENT)
Age: 71
End: 2021-06-13

## 2021-06-13 LAB
ANION GAP SERPL CALC-SCNC: 9 MMOL/L — SIGNIFICANT CHANGE UP (ref 5–17)
BUN SERPL-MCNC: 8 MG/DL — SIGNIFICANT CHANGE UP (ref 7–23)
CALCIUM SERPL-MCNC: 8.1 MG/DL — LOW (ref 8.4–10.5)
CHLORIDE SERPL-SCNC: 112 MMOL/L — HIGH (ref 96–108)
CO2 SERPL-SCNC: 23 MMOL/L — SIGNIFICANT CHANGE UP (ref 22–31)
CREAT SERPL-MCNC: 0.9 MG/DL — SIGNIFICANT CHANGE UP (ref 0.5–1.3)
GLUCOSE SERPL-MCNC: 148 MG/DL — HIGH (ref 70–99)
HCT VFR BLD CALC: 28.7 % — LOW (ref 39–50)
HGB BLD-MCNC: 9.6 G/DL — LOW (ref 13–17)
MAGNESIUM SERPL-MCNC: 2.1 MG/DL — SIGNIFICANT CHANGE UP (ref 1.6–2.6)
MCHC RBC-ENTMCNC: 31.1 PG — SIGNIFICANT CHANGE UP (ref 27–34)
MCHC RBC-ENTMCNC: 33.4 GM/DL — SIGNIFICANT CHANGE UP (ref 32–36)
MCV RBC AUTO: 92.9 FL — SIGNIFICANT CHANGE UP (ref 80–100)
NRBC # BLD: 0 /100 WBCS — SIGNIFICANT CHANGE UP (ref 0–0)
PHOSPHATE SERPL-MCNC: 3.4 MG/DL — SIGNIFICANT CHANGE UP (ref 2.5–4.5)
PLATELET # BLD AUTO: 178 K/UL — SIGNIFICANT CHANGE UP (ref 150–400)
POTASSIUM SERPL-MCNC: 3.6 MMOL/L — SIGNIFICANT CHANGE UP (ref 3.5–5.3)
POTASSIUM SERPL-SCNC: 3.6 MMOL/L — SIGNIFICANT CHANGE UP (ref 3.5–5.3)
RBC # BLD: 3.09 M/UL — LOW (ref 4.2–5.8)
RBC # FLD: 15.3 % — HIGH (ref 10.3–14.5)
SODIUM SERPL-SCNC: 144 MMOL/L — SIGNIFICANT CHANGE UP (ref 135–145)
WBC # BLD: 7.88 K/UL — SIGNIFICANT CHANGE UP (ref 3.8–10.5)
WBC # FLD AUTO: 7.88 K/UL — SIGNIFICANT CHANGE UP (ref 3.8–10.5)

## 2021-06-13 PROCEDURE — 99233 SBSQ HOSP IP/OBS HIGH 50: CPT

## 2021-06-13 RX ORDER — TRAMADOL HYDROCHLORIDE 50 MG/1
25 TABLET ORAL EVERY 8 HOURS
Refills: 0 | Status: DISCONTINUED | OUTPATIENT
Start: 2021-06-13 | End: 2021-06-13

## 2021-06-13 RX ORDER — PANTOPRAZOLE SODIUM 20 MG/1
1 TABLET, DELAYED RELEASE ORAL
Qty: 60 | Refills: 0
Start: 2021-06-13 | End: 2021-07-12

## 2021-06-13 RX ORDER — PANTOPRAZOLE SODIUM 20 MG/1
40 TABLET, DELAYED RELEASE ORAL EVERY 12 HOURS
Refills: 0 | Status: DISCONTINUED | OUTPATIENT
Start: 2021-06-13 | End: 2021-06-14

## 2021-06-13 RX ORDER — SUCRALFATE 1 G
10 TABLET ORAL
Qty: 1200 | Refills: 0
Start: 2021-06-13 | End: 2021-07-12

## 2021-06-13 RX ORDER — PREGABALIN 225 MG/1
1000 CAPSULE ORAL ONCE
Refills: 0 | Status: COMPLETED | OUTPATIENT
Start: 2021-06-13 | End: 2021-06-13

## 2021-06-13 RX ORDER — METOPROLOL TARTRATE 50 MG
12.5 TABLET ORAL
Qty: 0 | Refills: 0 | DISCHARGE
Start: 2021-06-13

## 2021-06-13 RX ORDER — APIXABAN 2.5 MG/1
5 TABLET, FILM COATED ORAL
Qty: 0 | Refills: 0 | DISCHARGE

## 2021-06-13 RX ORDER — LANOLIN ALCOHOL/MO/W.PET/CERES
6 CREAM (GRAM) TOPICAL AT BEDTIME
Refills: 0 | Status: DISCONTINUED | OUTPATIENT
Start: 2021-06-13 | End: 2021-06-14

## 2021-06-13 RX ADMIN — TRAMADOL HYDROCHLORIDE 25 MILLIGRAM(S): 50 TABLET ORAL at 14:16

## 2021-06-13 RX ADMIN — SODIUM CHLORIDE 50 MILLILITER(S): 9 INJECTION, SOLUTION INTRAVENOUS at 07:45

## 2021-06-13 RX ADMIN — Medication 1 GRAM(S): at 11:39

## 2021-06-13 RX ADMIN — Medication 6 MILLIGRAM(S): at 21:11

## 2021-06-13 RX ADMIN — TRAMADOL HYDROCHLORIDE 25 MILLIGRAM(S): 50 TABLET ORAL at 06:30

## 2021-06-13 RX ADMIN — Medication 12.5 MILLIGRAM(S): at 05:38

## 2021-06-13 RX ADMIN — Medication 1: at 16:41

## 2021-06-13 RX ADMIN — Medication 12.5 MILLIGRAM(S): at 17:05

## 2021-06-13 RX ADMIN — Medication 1 GRAM(S): at 05:38

## 2021-06-13 RX ADMIN — Medication 1: at 07:45

## 2021-06-13 RX ADMIN — ATORVASTATIN CALCIUM 40 MILLIGRAM(S): 80 TABLET, FILM COATED ORAL at 21:11

## 2021-06-13 RX ADMIN — PANTOPRAZOLE SODIUM 10 MG/HR: 20 TABLET, DELAYED RELEASE ORAL at 07:45

## 2021-06-13 RX ADMIN — Medication 1 GRAM(S): at 17:05

## 2021-06-13 RX ADMIN — TRAMADOL HYDROCHLORIDE 25 MILLIGRAM(S): 50 TABLET ORAL at 07:47

## 2021-06-13 RX ADMIN — PREGABALIN 1000 MICROGRAM(S): 225 CAPSULE ORAL at 15:38

## 2021-06-13 RX ADMIN — PANTOPRAZOLE SODIUM 40 MILLIGRAM(S): 20 TABLET, DELAYED RELEASE ORAL at 17:05

## 2021-06-13 RX ADMIN — TRAMADOL HYDROCHLORIDE 25 MILLIGRAM(S): 50 TABLET ORAL at 15:10

## 2021-06-13 NOTE — PROGRESS NOTE ADULT - ASSESSMENT
71M with afib on eliquis, DM2 not on insulin, hyperlipidemia, hx of CVA with no residuals, who presents with dizziness/GI bleed.      Severe Acute blood loss anemia likely due to UGI bleed/melena   -s/p 4 units of PRBC   -s/p Kcentra  -GI /critical care consulted  -s/p emergent EGD. active bleed found in duodenal sweep + gastritis s/p APC cautery  -diet advanced to clears and now fulls  -on protonix drip  -Hb 6/11 7.6: --> hb 6/13: 9.6  plan  advance diet: activated clears --> fulls  holding off on CTA now  maintain IV access  transfuse PRN (hgb <7 or symptomatic)  active TS    hypotension  likely due to lopressor and anemia  resolved    Dizziness/Nausea secondary to Upper GI Bleed  Neurology consulted :low suspicion for CVA    Tachycardia/aflutter -  likely rapid aflutter due to abrupt hypovolemia  Cardiology consulted  s/p diltizem changed to lopressor IV  plan  c/w lopressor  c/w tele    Unintentional weight loss  - consider CT C/A/P after GI bleeding has been stablized    DM2   - keep NPO  - start low dose coverage scale with FS q6h while NPO  - check A1c  - diabetic diet when can able to take PO    HLD  - cont with atorvastatin 40mg daily    Preventive measures  - hold AC 2/2 GI bleed    Patient inquisitive of watchman:   KM 24 hours... patient will need to be advaned to regular diet and ensure no active bleeding thereafter 71M with afib on eliquis, DM2 not on insulin, hyperlipidemia, hx of CVA with no residuals, who presents with dizziness/GI bleed.      Severe Acute blood loss anemia likely due to UGI bleed/melena   -s/p 4 units of PRBC   -s/p Kcentra  -GI /critical care consulted  -s/p emergent EGD. active bleed found in duodenal sweep + gastritis s/p APC cautery  -diet advanced to clears and now fulls  -on protonix drip  -Hb 6/11 7.6: --> hb 6/13: 9.6  plan  advance diet: activated clears --> fulls  holding off on CTA now  maintain IV access  transfuse PRN (hgb <7 or symptomatic)  active TS    hypotension  likely due to lopressor and anemia  resolved    Dizziness/Nausea secondary to Upper GI Bleed  Neurology consulted :low suspicion for CVA    Tachycardia/aflutter -  likely rapid aflutter due to abrupt hypovolemia  Cardiology consulted  s/p diltizem changed to lopressor IV  plan  c/w lopressor  c/w tele    Unintentional weight loss  - consider CT C/A/P after GI bleeding has been stablized    low b12  give one dose of IM b12 now    DM2   - start low dose coverage scale with FS q6h while NPO  - check A1c  - diabetic diet when can able to take PO    HLD  - cont with atorvastatin 40mg daily    Preventive measures  - hold AC 2/2 GI bleed    Patient inquisitive of watchman:   KM 24 hours... patient will need to be advaned to regular diet and ensure no active bleeding thereafter

## 2021-06-13 NOTE — PROGRESS NOTE ADULT - ASSESSMENT
The patient is a 71 year old male with a history of HL, DM, CVA, atrial fibrillation who is admitted with GI bleed, rapid atrial flutter.    Plan:  - Tachycardia improved  - Monitor hemoglobin  - Continue metoprolol tartrate 12.5 mg bid  - Hold apixaban - given prior history of CVA, will eventually need to be restarted vs. consideration of Watchman as outpatient  - GI follow-up

## 2021-06-13 NOTE — DISCHARGE NOTE PROVIDER - HOSPITAL COURSE
71M with afib on eliquis, DM2 not on insulin, hyperlipidemia, hx of CVA with no residuals, who presents with dizziness/GI bleed.      Severe Acute blood loss anemia likely due to UGI bleed/melena   -s/p 4 units of PRBC   -s/p Kcentra  -GI /critical care consulted  -s/p emergent EGD. active bleed found in duodenal sweep + gastritis s/p APC cautery  -diet advanced to clears and now fulls  -on protonix drip  -Hb 6/11 7.6: --> hb 6/13: 9.6  plan  advance diet: activated clears --> fulls  holding off on CTA now  maintain IV access  transfuse PRN (hgb <7 or symptomatic)  active TS    hypotension  likely due to lopressor and anemia  resolved    Dizziness/Nausea secondary to Upper GI Bleed  Neurology consulted :low suspicion for CVA    Tachycardia/aflutter -  likely rapid aflutter due to abrupt hypovolemia  Cardiology consulted  s/p diltizem changed to lopressor IV  plan  c/w lopressor  c/w tele    Unintentional weight loss  - consider CT C/A/P after GI bleeding has been stablized    low b12  give one dose of IM b12 now    DM2   - start low dose coverage scale with FS q6h while NPO  - check A1c  - diabetic diet when can able to take PO    HLD  - cont with atorvastatin 40mg daily    Preventive measures  - hold AC 2/2 GI bleed    Patient inquisitive of watchman:   KM 24 hours... patient will need to be advaned to regular diet and ensure no active bleeding thereafter    At this time patient is medically stable for discharge.     Greater than 30 minutes spent face to face encounter on discharge planning including care coordination planning, disposition and medication reconcillation

## 2021-06-13 NOTE — PROGRESS NOTE ADULT - SUBJECTIVE AND OBJECTIVE BOX
Patient seen and examined at bedside  no overnight events  vitals stable  hb at 9.6 today.  b12 checked low at 250  Patiejnt reports passing a lot of gas. waiting for regular food    Review of Systems:  General:denies fever chills, headache, weakness  HEENT: denies blurry vision,diffculty swallowing, difficulty hearing, tinnitus  Cardiovascular: denies chest pain  ,palpitations  Pulmonary:denies shortness of breath, cough, wheezing, hemoptysis  Gastrointestinal: denies abdominal pain, constipation, diarrhea,nausea , vomiting, hematochezia  : denies hematuria, dysuria, or incontinence  Neurological: denies weakness, numbness , tingling, dizziness, tremors  MSK: denies muscle pain, difficulty ambulating, swelling, back pain  skin: denies skin rash, itching, burning, or  skin lesions  Psychiatrical: denies mood disturbances, anxierty, feeling depressed, depression , or difficulty sleeping    Objective:  Vitals  T(C): 36.8 (06-13-21 @ 12:02), Max: 37.1 (06-12-21 @ 19:24)  HR: 68 (06-13-21 @ 08:00) (66 - 96)  BP: 128/83 (06-13-21 @ 08:00) (122/74 - 162/80)  RR: 20 (06-13-21 @ 08:00) (10 - 21)  SpO2: 99% (06-13-21 @ 08:00) (98% - 100%)    Physical Exam:  General: comfortable, no acute distress, well nourished  HEENT: Atraumatic, no LAD, trachea midline, PERRLA  Cardiovascular: normal s1s2, no murmurs, gallops or fricition rubs  Pulmonary: clear to ausculation Bilaterally, no wheezing , rhonchi  Gastrointestinal: soft non tender non distended, no masses felt, no organomegally  Muscloskeletal: no lower extremity edema, intact bilateral lower extremity pulses  Neurological: CN II-12 intact. No focal weakness  Psychiatrical: normal mood, cooperative  SKIN: no rash, lesions or ulcers    Labs:                          9.6    7.88  )-----------( 178      ( 13 Jun 2021 06:42 )             28.7     06-13    144  |  112<H>  |  8   ----------------------------<  148<H>  3.6   |  23  |  0.90    Ca    8.1<L>      13 Jun 2021 06:42  Phos  3.4     06-13  Mg     2.1     06-13              Active Medications  MEDICATIONS  (STANDING):  atorvastatin 40 milliGRAM(s) Oral at bedtime  dextrose 40% Gel 15 Gram(s) Oral once  dextrose 5%. 1000 milliLiter(s) (50 mL/Hr) IV Continuous <Continuous>  dextrose 5%. 1000 milliLiter(s) (100 mL/Hr) IV Continuous <Continuous>  dextrose 50% Injectable 25 Gram(s) IV Push once  dextrose 50% Injectable 12.5 Gram(s) IV Push once  dextrose 50% Injectable 25 Gram(s) IV Push once  glucagon  Injectable 1 milliGRAM(s) IntraMuscular once  insulin lispro (ADMELOG) corrective regimen sliding scale   SubCutaneous Before meals and at bedtime  metoprolol tartrate 12.5 milliGRAM(s) Oral two times a day  pantoprazole Infusion 8 mG/Hr (10 mL/Hr) IV Continuous <Continuous>  sucralfate suspension 1 Gram(s) Oral every 6 hours    MEDICATIONS  (PRN):  traMADol 25 milliGRAM(s) Oral every 8 hours PRN Moderate Pain (4 - 6)

## 2021-06-13 NOTE — CHART NOTE - NSCHARTNOTEFT_GEN_A_CORE
Tele border in [facility]  Pt is a 70 y/o male with PMHx of HLD, DM2, afib on eliquis, prior CVA admitted w/ UGIB/melena s/p 4u PRBC, eliquis reversal w/ kcentra and emergent EGD w/ active bleeding seen in the duodenal sweep with multiple duodenal ulcers, s/p APC and Bipolar cautery with resolution of bleeding.   Pt has been HD stable, HgB stable, on protonix, lopressor, requesting melatonin for sleep, ordered 6mg.  Pt being discharged tomorrow  Chart, labs, meds reviewed  Pt w/o critical care needs at this time  HD stable, in bed, NAD, no acute complaints  Made available to pt and nurse if needs arise  Will monitor overnight

## 2021-06-13 NOTE — PHYSICAL THERAPY INITIAL EVALUATION ADULT - RANGE OF MOTION EXAMINATION, REHAB EVAL
right shld flex ~70 degrees/Left UE ROM was WFL (within functional limits)/bilateral upper extremity ROM was WFL (within functional limits)/deficits as listed below

## 2021-06-13 NOTE — DISCHARGE NOTE PROVIDER - NSDCCPCAREPLAN_GEN_ALL_CORE_FT
PRINCIPAL DISCHARGE DIAGNOSIS  Diagnosis: Gastrointestinal hemorrhage, unspecified gastrointestinal hemorrhage type  Assessment and Plan of Treatment: Notes on your hospital course:  you were given 4 units of red blood cells   you were given Kcentra to reverse the effects of eliquis  we consulted our GI and critical care team  you underwent an emergent EGD, finding an active bleed in your duodedum  you improved well. your diet was advanced  your hemoglobin was stable at 9.6  we recommend  DO not take any aspirin, naproxen, steroids, spicy foods or any blood thinners until you have followed up with the gastroenterologist        SECONDARY DISCHARGE DIAGNOSES  Diagnosis: Atrial fibrillation and flutter  Assessment and Plan of Treatment: We recommend discussing with your cardiologist an evaluatin for the watchman procedure to protect you from having a stroke.     PRINCIPAL DISCHARGE DIAGNOSIS  Diagnosis: Gastrointestinal hemorrhage, unspecified gastrointestinal hemorrhage type  Assessment and Plan of Treatment: Notes on your hospital course:  you were given 4 units of red blood cells   you were given Kcentra to reverse the effects of eliquis  we consulted our GI and critical care team  you underwent an emergent EGD, finding an active bleed in your duodedum  you improved well. your diet was advanced  your hemoglobin was stable at 9.6  we recommend  DO not take any aspirin, naproxen, steroids, spicy foods or any blood thinners until you have followed up with the gastroenterologist        SECONDARY DISCHARGE DIAGNOSES  Diagnosis: Atrial fibrillation and flutter  Assessment and Plan of Treatment: We recommend discussing with your cardiologist an evaluatin for the watchman procedure to protect you from having a stroke.    Diagnosis: Low serum vitamin B12  Assessment and Plan of Treatment: During the hospital course you were found with a low b12 level  this may be due to lack eggs, meats in your diet  we have given you a booster shot with Intramusclar b12.  please followup with your PCP for further evaluation     PRINCIPAL DISCHARGE DIAGNOSIS  Diagnosis: Gastrointestinal hemorrhage, unspecified gastrointestinal hemorrhage type  Assessment and Plan of Treatment: Notes on your hospital course:  you were given 4 units of red blood cells   you were given Kcentra to reverse the effects of eliquis  we consulted our GI and critical care team  you underwent an emergent EGD, finding an active bleed in your duodedum  you improved well. your diet was advanced  your hemoglobin was stable at 9.6  we recommend  DO not take any aspirin, naproxen, steroids, spicy foods or any blood thinners until you have followed up with the gastroenterologist        SECONDARY DISCHARGE DIAGNOSES  Diagnosis: Atrial fibrillation and flutter  Assessment and Plan of Treatment: We recommend discussing with your cardiologist an evaluatin for the watchman procedure to protect you from having a stroke.  we have added new medication. " Long acting metoprolol 25mg " . please take this once a day. Hold for HR < 55.      Diagnosis: Low serum vitamin B12  Assessment and Plan of Treatment: During the hospital course you were found with a low b12 level  this may be due to lack eggs, meats in your diet  we have given you a booster shot with Intramusclar b12.  please followup with your PCP for further evaluation

## 2021-06-13 NOTE — PROGRESS NOTE ADULT - SUBJECTIVE AND OBJECTIVE BOX
Chief Complaint: Dizziness    Interval Events: No events overnight.    Review of Systems:  General: No fevers, chills, weight loss or gain  Skin: No rashes, color changes  Cardiovascular: No chest pain, orthopnea  Respiratory: No shortness of breath, cough  Gastrointestinal: No nausea, abdominal pain  Genitourinary: No incontinence, pain with urination  Musculoskeletal: No pain, swelling, decreased range of motion  Neurological: No headache, weakness  Psychiatric: No depression, anxiety  Endocrine: No weight loss or gain, increased thirst  All other systems are comprehensively negative.    Physical Exam:  Vital Signs Last 24 Hrs  T(C): 36.7 (13 Jun 2021 08:06), Max: 37.1 (12 Jun 2021 19:24)  T(F): 98.1 (13 Jun 2021 08:06), Max: 98.8 (12 Jun 2021 19:24)  HR: 68 (13 Jun 2021 08:00) (66 - 96)  BP: 128/83 (13 Jun 2021 08:00) (122/74 - 162/80)  BP(mean): 97 (13 Jun 2021 08:00) (73 - 113)  RR: 20 (13 Jun 2021 08:00) (10 - 21)  SpO2: 99% (13 Jun 2021 08:00) (98% - 100%)  General: NAD  HEENT: MMM  Neck: No JVD, no carotid bruit  Lungs: CTAB  CV: RRR, nl S1/S2, no M/R/G  Abdomen: S/NT/ND, +BS  Extremities: No LE edema, no cyanosis  Neuro: AAOx3, non-focal  Skin: No rash    Labs:             06-13    144  |  112<H>  |  8   ----------------------------<  148<H>  3.6   |  23  |  0.90    Ca    8.1<L>      13 Jun 2021 06:42  Phos  3.4     06-13  Mg     2.1     06-13                          9.6    7.88  )-----------( 178      ( 13 Jun 2021 06:42 )             28.7       Telemetry: AFL with variable block

## 2021-06-13 NOTE — PHYSICAL THERAPY INITIAL EVALUATION ADULT - PERTINENT HX OF CURRENT PROBLEM, REHAB EVAL
Pt. admitted with dizziness, black stool, GI bleed.  Pt. s/p upper endoscopy which revealed active bleeding in duodenum with ulcers.  Pt. s/p APC and bipolar cautery.

## 2021-06-13 NOTE — CHART NOTE - NSCHARTNOTEFT_GEN_A_CORE
Assessment:       71 year old male with a history of HL, DM, CVA, atrial fibrillation who is admitted with GI bleed, rapid atrial flutter. H/H trending up. hypokalemia resolved.  As per initial nutrition assessment Pt reported 10.0# weight loss x 1 month (5%) which is significant. Pt initially denied changes in diet. He does report increased work-related stress and going several hours without eating.           Factors impacting intake: [ ] none [ ] nausea  [ ] vomiting [ ] diarrhea [ ] constipation  [ ]chewing problems [ ] swallowing issues  [ ] other:     Diet Presciption: Diet, Regular (06-13-21 @ 08:47)    Intake:     Current Weight: Weight (kg): 80.7 (06-10 @ 10:40)  % Weight Change    Pertinent Medications: MEDICATIONS  (STANDING):  atorvastatin 40 milliGRAM(s) Oral at bedtime  dextrose 40% Gel 15 Gram(s) Oral once  dextrose 5% + lactated ringers. 1000 milliLiter(s) (50 mL/Hr) IV Continuous <Continuous>  dextrose 5%. 1000 milliLiter(s) (50 mL/Hr) IV Continuous <Continuous>  dextrose 5%. 1000 milliLiter(s) (100 mL/Hr) IV Continuous <Continuous>  dextrose 50% Injectable 25 Gram(s) IV Push once  dextrose 50% Injectable 12.5 Gram(s) IV Push once  dextrose 50% Injectable 25 Gram(s) IV Push once  glucagon  Injectable 1 milliGRAM(s) IntraMuscular once  insulin lispro (ADMELOG) corrective regimen sliding scale   SubCutaneous Before meals and at bedtime  metoprolol tartrate 12.5 milliGRAM(s) Oral two times a day  pantoprazole Infusion 8 mG/Hr (10 mL/Hr) IV Continuous <Continuous>  sucralfate suspension 1 Gram(s) Oral every 6 hours    MEDICATIONS  (PRN):  traMADol 25 milliGRAM(s) Oral every 8 hours PRN Moderate Pain (4 - 6)    Pertinent Labs: 06-13 Na144 mmol/L Glu 148 mg/dL<H> K+ 3.6 mmol/L Cr  0.90 mg/dL BUN 8 mg/dL 06-13 Phos 3.4 mg/dL 06-11 Alb 2.6 g/dL<L>     CAPILLARY BLOOD GLUCOSE      POCT Blood Glucose.: 146 mg/dL (13 Jun 2021 12:08)  POCT Blood Glucose.: 155 mg/dL (13 Jun 2021 07:33)  POCT Blood Glucose.: 127 mg/dL (12 Jun 2021 22:06)  POCT Blood Glucose.: 130 mg/dL (12 Jun 2021 16:34)    Skin:     Estimated Needs:   [ ] no change since previous assessment  [ ] recalculated:     Previous Nutrition Diagnosis:   [ ] Inadequate Energy Intake [ ]Inadequate Oral Intake [ ] Excessive Energy Intake   [ ] Underweight [ ] Increased Nutrient Needs [ ] Overweight/Obesity   [ ] Altered GI Function [ ] Unintended Weight Loss [ ] Food & Nutrition Related Knowledge Deficit [ ] Malnutrition     Nutrition Diagnosis is [ ] ongoing  [ ] resolved [ ] not applicable     New Nutrition Diagnosis: [ ] not applicable       Interventions:   Recommend  [ ] Change Diet To:  [ ] Nutrition Supplement  [ ] Nutrition Support  [ ] Other:     Monitoring and Evaluation:   [ ] PO intake [ x ] Tolerance to diet prescription [ x ] weights [ x ] labs[ x ] follow up per protocol  [ ] other: Assessment:       71 year old male with a history of HL, DM, CVA, atrial fibrillation who is admitted with GI bleed, rapid atrial flutter. H/H trending up. hypokalemia resolved.  As per initial nutrition assessment Pt reported 10.0# weight loss x 1 month (5%) which is significant. Pt initially denied changes in diet. He does report increased work-related stress and going several hours without eating. He also reported decreased intake of beer-Both of which could result in weight loss. Pt diet advanced to regular and he is tolerating without trouble. Physically pt without significant muscle/fat wasting. Briefly reviewed importance of receiving adequate nutrition. Pt verbalized understanding. Currently pt does not meet criteria for malnutrition. Pt will continue to monitor weight s/p d/c and will make PCP  aware.           Factors impacting intake: [ ] none [ ] nausea  [ ] vomiting [ ] diarrhea [ ] constipation  [ ]chewing problems [ ] swallowing issues  [ ] other:     Diet Presciption: Diet, Regular (06-13-21 @ 08:47)    Intake: >50%    Current Weight: Weight (kg): 80.7 (06-10 @ 10:40)  % Weight Change     Pertinent Medications: MEDICATIONS  (STANDING):  atorvastatin 40 milliGRAM(s) Oral at bedtime  dextrose 40% Gel 15 Gram(s) Oral once  dextrose 5% + lactated ringers. 1000 milliLiter(s) (50 mL/Hr) IV Continuous <Continuous>  dextrose 5%. 1000 milliLiter(s) (50 mL/Hr) IV Continuous <Continuous>  dextrose 5%. 1000 milliLiter(s) (100 mL/Hr) IV Continuous <Continuous>  dextrose 50% Injectable 25 Gram(s) IV Push once  dextrose 50% Injectable 12.5 Gram(s) IV Push once  dextrose 50% Injectable 25 Gram(s) IV Push once  glucagon  Injectable 1 milliGRAM(s) IntraMuscular once  insulin lispro (ADMELOG) corrective regimen sliding scale   SubCutaneous Before meals and at bedtime  metoprolol tartrate 12.5 milliGRAM(s) Oral two times a day  pantoprazole Infusion 8 mG/Hr (10 mL/Hr) IV Continuous <Continuous>  sucralfate suspension 1 Gram(s) Oral every 6 hours    MEDICATIONS  (PRN):  traMADol 25 milliGRAM(s) Oral every 8 hours PRN Moderate Pain (4 - 6)    Pertinent Labs: 06-13 Na144 mmol/L Glu 148 mg/dL<H> K+ 3.6 mmol/L Cr  0.90 mg/dL BUN 8 mg/dL 06-13 Phos 3.4 mg/dL 06-11 Alb 2.6 g/dL<L>     CAPILLARY BLOOD GLUCOSE      POCT Blood Glucose.: 146 mg/dL (13 Jun 2021 12:08)  POCT Blood Glucose.: 155 mg/dL (13 Jun 2021 07:33)  POCT Blood Glucose.: 127 mg/dL (12 Jun 2021 22:06)  POCT Blood Glucose.: 130 mg/dL (12 Jun 2021 16:34)    Skin: intact    Estimated Needs:   [ x] no change since previous assessment  [ ] recalculated:     Previous Nutrition Diagnosis:   [ ] Inadequate Energy Intake [ ]Inadequate Oral Intake [ ] Excessive Energy Intake   [ ] Underweight [ ] Increased Nutrient Needs [ ] Overweight/Obesity   [ x] Altered GI Function [ ] Unintended Weight Loss [ ] Food & Nutrition Related Knowledge Deficit [ ] Malnutrition     Nutrition Diagnosis is [x ] ongoing  [ ] resolved [ ] not applicable     New Nutrition Diagnosis: [ ] not applicable       Interventions:   Recommend  [ ] Change Diet To:  [ ] Nutrition Supplement  [ ] Nutrition Support  [x ] Other: continue POC    Monitoring and Evaluation:   [x ] PO intake [ x ] Tolerance to diet prescription [ x ] weights [ x ] labs[ x ] follow up per protocol  [ ] other:

## 2021-06-13 NOTE — DISCHARGE NOTE PROVIDER - NSDCMRMEDTOKEN_GEN_ALL_CORE_FT
atorvastatin: 40 milligram(s) orally once a day  Carafate 1 g/10 mL oral suspension: 10 milliliter(s) orally every 6 hours  metFORMIN: 500 milligram(s) orally 2 times a day  metoprolol: 12.5 milligram(s) orally 2 times a day  Protonix 40 mg oral delayed release tablet: 1 tab(s) orally every 12 hours   atorvastatin: 40 milligram(s) orally once a day  Carafate 1 g/10 mL oral suspension: 10 milliliter(s) orally every 6 hours  metFORMIN: 500 milligram(s) orally 2 times a day  metoprolol succinate 25 mg oral capsule, extended release: 1 cap(s) orally once a day   Protonix 40 mg oral delayed release tablet: 1 tab(s) orally every 12 hours

## 2021-06-13 NOTE — DISCHARGE NOTE PROVIDER - PROVIDER TOKENS
PROVIDER:[TOKEN:[[id number]],FOLLOWUP:[1 week]],PROVIDER:[TOKEN:[[id number]],FOLLOWUP:[1 week]]

## 2021-06-13 NOTE — PROGRESS NOTE ADULT - SUBJECTIVE AND OBJECTIVE BOX
Neurology follow up note    AMY BREWER71yMale      Interval History:    Patient feels ok no new complaints sitting in chair was able to walk today    MEDICATIONS    atorvastatin 40 milliGRAM(s) Oral at bedtime  dextrose 40% Gel 15 Gram(s) Oral once  dextrose 5% + lactated ringers. 1000 milliLiter(s) IV Continuous <Continuous>  dextrose 5%. 1000 milliLiter(s) IV Continuous <Continuous>  dextrose 5%. 1000 milliLiter(s) IV Continuous <Continuous>  dextrose 50% Injectable 25 Gram(s) IV Push once  dextrose 50% Injectable 12.5 Gram(s) IV Push once  dextrose 50% Injectable 25 Gram(s) IV Push once  glucagon  Injectable 1 milliGRAM(s) IntraMuscular once  insulin lispro (ADMELOG) corrective regimen sliding scale   SubCutaneous Before meals and at bedtime  metoprolol tartrate 12.5 milliGRAM(s) Oral two times a day  pantoprazole Infusion 8 mG/Hr IV Continuous <Continuous>  sucralfate suspension 1 Gram(s) Oral every 6 hours  traMADol 25 milliGRAM(s) Oral every 8 hours PRN      Allergies    No Known Allergies    Intolerances            Vital Signs Last 24 Hrs  T(C): 36.7 (13 Jun 2021 08:06), Max: 37.1 (12 Jun 2021 19:24)  T(F): 98.1 (13 Jun 2021 08:06), Max: 98.8 (12 Jun 2021 19:24)  HR: 68 (13 Jun 2021 08:00) (66 - 96)  BP: 128/83 (13 Jun 2021 08:00) (122/74 - 162/80)  BP(mean): 97 (13 Jun 2021 08:00) (88 - 113)  RR: 20 (13 Jun 2021 08:00) (10 - 21)  SpO2: 99% (13 Jun 2021 08:00) (98% - 100%)      REVIEW OF SYSTEMS:  Constitutional:  The patient denies fever, chills, or night sweats.  Head:  No headache.  Eyes:  No double vision or blurry vision.  Ears:  No ringing in the ears.  Neck:  No neck pain.  Respiratory:  No shortness of breath.  Cardiovascular:  No chest pain.  Abdomen:  No nausea, vomiting, or abdominal pain.  Extremities/Neurological:  No numbness or tingling.  Musculoskeletal:  No joint pain.    PHYSICAL EXAMINATION:   HEENT:  Head:  Normocephalic, atraumatic.  Eyes:  No scleral icterus.  Ears:  Hearing bilaterally intact.  NECK:  Supple.  RESPIRATORY:  Good air entry bilaterally.  CARDIOVASCULAR:  S1 and S2 heard.  ABDOMEN:  Soft and nontender.  EXTREMITIES:  No clubbing or cyanosis was noted.      NEUROLOGIC:  The patient is awake, alert, and oriented x3.  Extraocular movements were intact.  Pupils were equal, round, and reactive bilaterally 3 mm to 2 mm.  Speech was fluent.  Smile was symmetric.  Motor:  Bilateral upper were 5/5, does have slightly decreased range of motion of the right shoulder, but has a history of injury there.  Bilateral lower extremities were 4+/5.  Sensory:  Bilateral upper and lower intact to light touch.                  LABS:  CBC Full  -  ( 13 Jun 2021 06:42 )  WBC Count : 7.88 K/uL  RBC Count : 3.09 M/uL  Hemoglobin : 9.6 g/dL  Hematocrit : 28.7 %  Platelet Count - Automated : 178 K/uL  Mean Cell Volume : 92.9 fl  Mean Cell Hemoglobin : 31.1 pg  Mean Cell Hemoglobin Concentration : 33.4 gm/dL  Auto Neutrophil # : x  Auto Lymphocyte # : x  Auto Monocyte # : x  Auto Eosinophil # : x  Auto Basophil # : x  Auto Neutrophil % : x  Auto Lymphocyte % : x  Auto Monocyte % : x  Auto Eosinophil % : x  Auto Basophil % : x      06-13    144  |  112<H>  |  8   ----------------------------<  148<H>  3.6   |  23  |  0.90    Ca    8.1<L>      13 Jun 2021 06:42  Phos  3.4     06-13  Mg     2.1     06-13      Hemoglobin A1C:       Vitamin B12         RADIOLOGY        ANALYSIS AND PLAN:  This is a 71-year-old with episodes of dizziness, ataxia, feeling weak all over.  The clinical impression is most likely presyncopal event leading to cerebral hypoperfusion secondary to underlying volume depletion with possibly GI issues, suspect less likely this was a TIA or a subclinical seizure event.  Telemetry evaluation.  Please maintain systolic blood pressure greater than 100 to help maintain cerebral perfusion.  For diabetes, strict control of blood sugars.  For history of high cholesterol, continue the patient on statin.  monitor H/H as needed     Greater than 40 minutes of time was spent with the patient, plan of care, reviewing data, speaking to the multidisciplinary healthcare team with greater than 50% of that time in counseling and care coordination.

## 2021-06-13 NOTE — PHYSICAL THERAPY INITIAL EVALUATION ADULT - ADDITIONAL COMMENTS
Lives in house with wife.  3 stairs to enter with HR; 2 + 5+ 8 stairs inside with railings.  Pt is a .

## 2021-06-13 NOTE — PROGRESS NOTE ADULT - SUBJECTIVE AND OBJECTIVE BOX
INTERVAL HPI/OVERNIGHT EVENTS:  No new overnight event.  No N/V/D.  Tolerating diet.  no bleeding    Allergies    No Known Allergies    Intolerances          General:  No wt loss, fevers, chills, night sweats, fatigue,   Eyes:  Good vision, no reported pain  ENT:  No sore throat, pain, runny nose, dysphagia  CV:  No pain, palpitations, hypo/hypertension  Resp:  No dyspnea, cough, tachypnea, wheezing  GI:  No pain, No nausea, No vomiting, No diarrhea, No constipation, No weight loss, No fever, No pruritis, No rectal bleeding, No tarry stools, No dysphagia,  :  No pain, bleeding, incontinence, nocturia  Muscle:  No pain, weakness  Neuro:  No weakness, tingling, memory problems  Psych:  No fatigue, insomnia, mood problems, depression  Endocrine:  No polyuria, polydipsia, cold/heat intolerance  Heme:  No petechiae, ecchymosis, easy bruisability  Skin:  No rash, tattoos, scars, edema      PHYSICAL EXAM:   Vital Signs:  Vital Signs Last 24 Hrs  T(C): 36.7 (2021 04:03), Max: 37.1 (2021 19:24)  T(F): 98.1 (2021 04:03), Max: 98.8 (2021 19:24)  HR: 68 (2021 08:00) (66 - 96)  BP: 128/83 (2021 08:00) (122/74 - 162/80)  BP(mean): 97 (2021 08:00) (73 - 113)  RR: 20 (2021 08:00) (10 - 21)  SpO2: 99% (2021 08:00) (98% - 100%)  Daily     Daily Weight in k.7 (2021 04:03)I&O's Summary    2021 07:01  -  2021 07:00  --------------------------------------------------------  IN: 2515 mL / OUT: 3375 mL / NET: -860 mL    2021 07:01  -  2021 08:48  --------------------------------------------------------  IN: 0 mL / OUT: 200 mL / NET: -200 mL        GENERAL:  Appears stated age, well-groomed, well-nourished, no distress  HEENT:  NC/AT,  conjunctivae clear and pink, no thyromegaly, nodules, adenopathy, no JVD, sclera -anicteric  CHEST:  Full & symmetric excursion, no increased effort, breath sounds clear  HEART:  Regular rhythm, S1, S2, no murmur/rub/S3/S4, no abdominal bruit, no edema  ABDOMEN:  Soft, non-tender, non-distended, normoactive bowel sounds,  no masses ,no hepato-splenomegaly, no signs of chronic liver disease  EXTEREMITIES:  no cyanosis,clubbing or edema  SKIN:  No rash/erythema/ecchymoses/petechiae/wounds/abscess/warm/dry  NEURO:  Alert, oriented, no asterixis, no tremor, no encephalopathy      LABS:                        9.6    7.88  )-----------( 178      ( 2021 06:42 )             28.7     06-13    144  |  112<H>  |  8   ----------------------------<  148<H>  3.6   |  23  |  0.90    Ca    8.1<L>      2021 06:42  Phos  3.4     06-13  Mg     2.1     -13          amylase   lipase  RADIOLOGY & ADDITIONAL TESTS:

## 2021-06-13 NOTE — PROGRESS NOTE ADULT - ASSESSMENT
covering for Dr Alberts -   71 year old male with a history of HL, DM, CVA, atrial fibrillation who is admitted with GI bleed, rapid atrial flutter.    s/p 4 units PRBC  on PPI  serial hgb  monitor VS and HD  cvs rx regimen and Bp control - AF management  serial CE noted  Cardio following  I and O  [facility] monitoring in progress  assist with needs and ADL  on clears PO

## 2021-06-13 NOTE — DISCHARGE NOTE PROVIDER - CARE PROVIDER_API CALL
Terence Dent (MD)  Cardiovascular Disease; Internal Medicine  [address] [facility] [address]  [place] [address]  Phone: [phone]  Fax: [phone]  Follow Up Time: 1 week    Jj Girard ()  Internal Medicine  [address] [place] [address]  Phone: [phone]  Fax: [phone]  Follow Up Time: 1 week

## 2021-06-14 ENCOUNTER — NON-APPOINTMENT (OUTPATIENT)
Age: 71
End: 2021-06-14

## 2021-06-14 ENCOUNTER — TRANSCRIPTION ENCOUNTER (OUTPATIENT)
Age: 71
End: 2021-06-14

## 2021-06-14 VITALS
RESPIRATION RATE: 18 BRPM | SYSTOLIC BLOOD PRESSURE: 112 MMHG | DIASTOLIC BLOOD PRESSURE: 58 MMHG | OXYGEN SATURATION: 95 % | HEART RATE: 91 BPM

## 2021-06-14 PROBLEM — E11.9 TYPE 2 DIABETES MELLITUS WITHOUT COMPLICATIONS: Chronic | Status: ACTIVE | Noted: 2021-06-09

## 2021-06-14 PROBLEM — E78.5 HYPERLIPIDEMIA, UNSPECIFIED: Chronic | Status: ACTIVE | Noted: 2021-06-09

## 2021-06-14 PROBLEM — I63.9 CEREBRAL INFARCTION, UNSPECIFIED: Chronic | Status: ACTIVE | Noted: 2021-06-09

## 2021-06-14 PROBLEM — M75.01 ADHESIVE CAPSULITIS OF RIGHT SHOULDER: Chronic | Status: ACTIVE | Noted: 2021-06-09

## 2021-06-14 LAB
HCT VFR BLD CALC: 26.9 % — LOW (ref 39–50)
HGB BLD-MCNC: 9 G/DL — LOW (ref 13–17)
MCHC RBC-ENTMCNC: 31.7 PG — SIGNIFICANT CHANGE UP (ref 27–34)
MCHC RBC-ENTMCNC: 33.5 GM/DL — SIGNIFICANT CHANGE UP (ref 32–36)
MCV RBC AUTO: 94.7 FL — SIGNIFICANT CHANGE UP (ref 80–100)
NRBC # BLD: 0 /100 WBCS — SIGNIFICANT CHANGE UP (ref 0–0)
PLATELET # BLD AUTO: 207 K/UL — SIGNIFICANT CHANGE UP (ref 150–400)
RBC # BLD: 2.84 M/UL — LOW (ref 4.2–5.8)
RBC # FLD: 15.9 % — HIGH (ref 10.3–14.5)
WBC # BLD: 6.7 K/UL — SIGNIFICANT CHANGE UP (ref 3.8–10.5)
WBC # FLD AUTO: 6.7 K/UL — SIGNIFICANT CHANGE UP (ref 3.8–10.5)

## 2021-06-14 PROCEDURE — 96374 THER/PROPH/DIAG INJ IV PUSH: CPT

## 2021-06-14 PROCEDURE — 99239 HOSP IP/OBS DSCHRG MGMT >30: CPT

## 2021-06-14 PROCEDURE — 85027 COMPLETE CBC AUTOMATED: CPT

## 2021-06-14 PROCEDURE — 85730 THROMBOPLASTIN TIME PARTIAL: CPT

## 2021-06-14 PROCEDURE — 36430 TRANSFUSION BLD/BLD COMPNT: CPT

## 2021-06-14 PROCEDURE — 83036 HEMOGLOBIN GLYCOSYLATED A1C: CPT

## 2021-06-14 PROCEDURE — 71045 X-RAY EXAM CHEST 1 VIEW: CPT

## 2021-06-14 PROCEDURE — 86923 COMPATIBILITY TEST ELECTRIC: CPT

## 2021-06-14 PROCEDURE — 86850 RBC ANTIBODY SCREEN: CPT

## 2021-06-14 PROCEDURE — 86900 BLOOD TYPING SEROLOGIC ABO: CPT

## 2021-06-14 PROCEDURE — 36415 COLL VENOUS BLD VENIPUNCTURE: CPT

## 2021-06-14 PROCEDURE — 99285 EMERGENCY DEPT VISIT HI MDM: CPT | Mod: 25

## 2021-06-14 PROCEDURE — P9016: CPT

## 2021-06-14 PROCEDURE — 84484 ASSAY OF TROPONIN QUANT: CPT

## 2021-06-14 PROCEDURE — 82607 VITAMIN B-12: CPT

## 2021-06-14 PROCEDURE — 87635 SARS-COV-2 COVID-19 AMP PRB: CPT

## 2021-06-14 PROCEDURE — 85025 COMPLETE CBC W/AUTO DIFF WBC: CPT

## 2021-06-14 PROCEDURE — 80048 BASIC METABOLIC PNL TOTAL CA: CPT

## 2021-06-14 PROCEDURE — 93005 ELECTROCARDIOGRAM TRACING: CPT

## 2021-06-14 PROCEDURE — 86769 SARS-COV-2 COVID-19 ANTIBODY: CPT

## 2021-06-14 PROCEDURE — 83735 ASSAY OF MAGNESIUM: CPT

## 2021-06-14 PROCEDURE — 86803 HEPATITIS C AB TEST: CPT

## 2021-06-14 PROCEDURE — 80053 COMPREHEN METABOLIC PANEL: CPT

## 2021-06-14 PROCEDURE — 85610 PROTHROMBIN TIME: CPT

## 2021-06-14 PROCEDURE — 84100 ASSAY OF PHOSPHORUS: CPT

## 2021-06-14 PROCEDURE — 97161 PT EVAL LOW COMPLEX 20 MIN: CPT

## 2021-06-14 PROCEDURE — 86901 BLOOD TYPING SEROLOGIC RH(D): CPT

## 2021-06-14 PROCEDURE — 82272 OCCULT BLD FECES 1-3 TESTS: CPT

## 2021-06-14 PROCEDURE — 82962 GLUCOSE BLOOD TEST: CPT

## 2021-06-14 RX ORDER — METOPROLOL TARTRATE 50 MG
1 TABLET ORAL
Qty: 25 | Refills: 0
Start: 2021-06-14 | End: 2021-07-08

## 2021-06-14 RX ORDER — PANTOPRAZOLE SODIUM 20 MG/1
1 TABLET, DELAYED RELEASE ORAL
Qty: 60 | Refills: 0
Start: 2021-06-14 | End: 2021-07-13

## 2021-06-14 RX ORDER — SUCRALFATE 1 G
10 TABLET ORAL
Qty: 1200 | Refills: 0
Start: 2021-06-14 | End: 2021-07-13

## 2021-06-14 RX ORDER — SUCRALFATE 1 G
1 TABLET ORAL
Qty: 120 | Refills: 0
Start: 2021-06-14 | End: 2021-07-13

## 2021-06-14 RX ADMIN — PANTOPRAZOLE SODIUM 40 MILLIGRAM(S): 20 TABLET, DELAYED RELEASE ORAL at 06:37

## 2021-06-14 RX ADMIN — Medication 12.5 MILLIGRAM(S): at 06:37

## 2021-06-14 RX ADMIN — Medication 1: at 12:08

## 2021-06-14 RX ADMIN — Medication 1 GRAM(S): at 00:40

## 2021-06-14 RX ADMIN — Medication 1 GRAM(S): at 06:37

## 2021-06-14 RX ADMIN — Medication 1 GRAM(S): at 11:33

## 2021-06-14 NOTE — PROGRESS NOTE ADULT - SUBJECTIVE AND OBJECTIVE BOX
PULMONARY/CRITICAL CARE    DOS 6/14/21  Doing well. No bleeding. Tolerated diet. Ambulated.   No bm. BP better. HR stable.      Had EGD--multiple duodenal ulcers.     Patient is a 71y old  Male who presents with a chief complaint of GI bleed/dizziness (09 Jun 2021 20:53)  71M with afib on eliquis, DM2 not on insulin, hyperlipidemia, hx of CVA with no residuals, who presents with dizziness/GI bleed.  Patient reports having dark stools starting yesterday morning and then started to have dizziness when standing.  Also complained of some light-headedness when sitting.  However no LOC or headaches.  Denies any abdominal pain.  Had an episode of nausea and one episode of non-bloody non-bilious vomiting.  No chest pain, SOB, palpitations, weakness.  No recent illnesses such as fevers, cough.  Has been having some unintentional weight loss of 10lbs in the past month.  Did not have any history of GI bleed in the past.  Had an endoscopy about 4 years ago for dysphagia but was told he had a normal EGD (no ulcers).  No hx of colonoscopy.  No known family hx of GI issues or bleeding.   Denies NSAIDS, ASA    In the ED, patient's triage vitals were /71   RR 20, 97%  T 97.5F.  Found to have a hgb of 8.5 (last known hgb was 14.4 in 10/2020) and leukocytosis of 13.7.  Patient's EKG showed afib with a HR of 137.  Patient was ordered and transfused 1u of pRBC.  ICU was consulted for anemia and tachycardia.  GI (Dr. Girard) was also consulted.  Patient feels stable and asymptomatic as long as he does not move.      Pt. still has low Hgb despite transfusion and ivf. Still in rapid AF/flutter.  BRIEF HOSPITAL COURSE: ***    Events last 24 hours: ***    PAST MEDICAL & SURGICAL HISTORY:  GERD (gastroesophageal reflux disease)    Type 2 diabetes mellitus    Hyperlipidemia    Cerebrovascular accident (CVA)    Adhesive capsulitis of right shoulder    H/O pilonidal cyst      Allergies    No Known Allergies    Intolerances      FAMILY HISTORY/ social: ; no  cigs; drinks 1 beer daily  Family hx of lung cancer (Father, Mother, Sibling)        Medications:    metoprolol tartrate Injectable 5 milliGRAM(s) IV Push every 6 hours PRN            pantoprazole Infusion 8 mG/Hr IV Continuous <Continuous>      atorvastatin 40 milliGRAM(s) Oral at bedtime  dextrose 40% Gel 15 Gram(s) Oral once  dextrose 50% Injectable 25 Gram(s) IV Push once  dextrose 50% Injectable 12.5 Gram(s) IV Push once  dextrose 50% Injectable 25 Gram(s) IV Push once  glucagon  Injectable 1 milliGRAM(s) IntraMuscular once  insulin lispro (ADMELOG) corrective regimen sliding scale   SubCutaneous three times a day before meals  insulin lispro (ADMELOG) corrective regimen sliding scale   SubCutaneous at bedtime    dextrose 5%. 1000 milliLiter(s) IV Continuous <Continuous>  dextrose 5%. 1000 milliLiter(s) IV Continuous <Continuous>                ICU Vital Signs Last 24 Hrs  T(C): 36.8 (10 Juan 2021 05:00), Max: 36.9 (10 Juan 2021 04:00)  T(F): 98.3 (10 Juan 2021 05:00), Max: 98.4 (10 Juan 2021 04:00)  HR: 122 (10 Juan 2021 06:34) (106 - 145)  BP: 65/53 (10 Juan 2021 06:34) (65/53 - 128/87)  BP(mean): 59 (10 Juan 2021 06:34) (59 - 100)  ABP: --  ABP(mean): --  RR: 19 (10 Juan 2021 06:34) (11 - 32)  SpO2: 100% (10 Juan 2021 06:34) (97% - 100%)    Vital Signs Last 24 Hrs  T(C): 36.8 (10 Juan 2021 05:00), Max: 36.9 (10 Juan 2021 04:00)  T(F): 98.3 (10 Juan 2021 05:00), Max: 98.4 (10 Juan 2021 04:00)  HR: 122 (10 Juan 2021 06:34) (106 - 145)  BP: 65/53 (10 Juan 2021 06:34) (65/53 - 128/87)  BP(mean): 59 (10 Juan 2021 06:34) (59 - 100)  RR: 19 (10 Juan 2021 06:34) (11 - 32)  SpO2: 100% (10 Juan 2021 06:34) (97% - 100%)        I&O's Detail    09 Jun 2021 07:01  -  10 Juan 2021 07:00  --------------------------------------------------------  IN:  Total IN: 0 mL    OUT:    Voided (mL): 800 mL  Total OUT: 800 mL    Total NET: -800 mL            LABS:                        7.2    10.60 )-----------( 163      ( 10 Juan 2021 04:27 )             22.1     06-10    139  |  111<H>  |  48<H>  ----------------------------<  175<H>  4.6   |  18<L>  |  0.93    Ca    7.9<L>      10 Juan 2021 04:27  Phos  2.7     06-10  Mg     1.7     06-10    TPro  4.5<L>  /  Alb  2.5<L>  /  TBili  0.5  /  DBili  x   /  AST  9<L>  /  ALT  24  /  AlkPhos  25<L>  06-10      CARDIAC MARKERS ( 10 Juan 2021 04:33 )  .035 ng/mL / x     / x     / x     / x          CAPILLARY BLOOD GLUCOSE      POCT Blood Glucose.: 226 mg/dL (09 Jun 2021 22:31)    PT/INR - ( 10 Juan 2021 04:27 )   PT: 14.2 sec;   INR: 1.18 ratio         PTT - ( 10 Juan 2021 04:27 )  PTT:28.7 sec    CULTURES:      Physical Examination:    General: No acute distress.  sitting up comfortably in bed    HEENT: Pupils equal, reactive to light.  Symmetric.    PULM: Clear to auscultation bilaterally, no wheeze rhonchi rales, good excursion no change percussion    CVS: irregular rate and rhythm, 1/6 sys murmurs, rubs, or gallops    ABD: Soft, nondistended, nontender, normoactive bowel sounds, no masses    EXT: No edema, nontender calves    SKIN: Warm and well perfused, no rashes noted.    NEURO: Alert, oriented, interactive, nonfocal    RADIOLOGY: ***    General:  · Primary Surgeon	Medrano  · Co-Surgeon	Shaji  · Assistant(s)	N/A  · Type of Anesthesia	MAC  · Anesthesiologist	Jayshree    Pre-Op Diagnosis, Post-Op Diagnosis and Procedure:   Pre-Op, Post-Op and Procedure Selector:  ·  PRE-OP DIAGNOSIS:  Melena 10-Juan-2021 12:21:58  Miladys Medrano.  ·  PROCEDURES:  UGI endoscopy 10-Juan-2021 12:22:08 see full operative report for detail  1. Active bleeding seen in the duodenal sweep with multiple duodenal ulcers, s/p APC and Bipolar cautery with good control of bleeding by the end of the procedure  2. gastritis  .  Rec:  1. NPO  2. PPI gtt  3. hold anti-plt agent  4. CBC q12 until stable  5. transfuse if Hgb <7   Miladys Medrano.      Operative Findings:  · Operative Findings	see above    Specimens/Blood Loss/IV/Output/Protocol/VTE:   Specimens/Blood Loss/IV/Output/Protocol/VTE:  · Specimens	0  · Estimated Blood Loss	0 milliLiter(s)      Electronic Signatures:  Miladys Medrano)  (Signed 10-Juan-2021 12:23)  	Authored: General, Pre-Op Diagnosis, Post-Op Diagnosis and Procedure, Operative Findings, Specimens/Blood Loss/IV/Output/Protocol/VTE      Last Updated: 10-Juan-2021 12:23 by Miladys Medrano)

## 2021-06-14 NOTE — PROGRESS NOTE ADULT - ASSESSMENT
ugib   duodenal bleed   platelet dysfunction    plan  will need outpatient f/u in office  adv diet   ppi bid and carafate 1 gr q 6   benefits of A/C vs risks of rebleeding- pt at high risk for rebleed, would favor re-look endoscopy in a few weeks to assess mucosal healing    Miladys Medrano MD  Gastroenterology  [facility]  [address] [place] [address] [phone]

## 2021-06-14 NOTE — DISCHARGE NOTE NURSING/CASE MANAGEMENT/SOCIAL WORK - NSTRANSFERBELONGINGSDISPO_GEN_A_NUR
with patient Ftsg Text: The defect edges were debeveled with a #15c scalpel blade.  Given the location of the defect, shape of the defect and the proximity to free margins a full thickness skin graft was deemed most appropriate.  Using a sterile surgical marker, the primary defect shape was transferred to the donor site. The area thus outlined was incised deep to adipose tissue with a #15c scalpel blade.  The harvested graft was then trimmed of adipose tissue until only dermis and epidermis was left.  The skin margins of the secondary defect were undermined to an appropriate distance in all directions utilizing iris scissors.  The secondary defect was closed with interrupted buried subcutaneous sutures.  The skin edges were then re-apposed with running  sutures.  The skin graft was then placed in the primary defect and oriented appropriately.

## 2021-06-14 NOTE — PROGRESS NOTE ADULT - SUBJECTIVE AND OBJECTIVE BOX
Neurology follow up note    AMY BREWER71yMale      Interval History:    Patient feels ok no new complaints.    MEDICATIONS    atorvastatin 40 milliGRAM(s) Oral at bedtime  dextrose 40% Gel 15 Gram(s) Oral once  dextrose 5%. 1000 milliLiter(s) IV Continuous <Continuous>  dextrose 5%. 1000 milliLiter(s) IV Continuous <Continuous>  dextrose 50% Injectable 25 Gram(s) IV Push once  dextrose 50% Injectable 12.5 Gram(s) IV Push once  dextrose 50% Injectable 25 Gram(s) IV Push once  glucagon  Injectable 1 milliGRAM(s) IntraMuscular once  insulin lispro (ADMELOG) corrective regimen sliding scale   SubCutaneous Before meals and at bedtime  melatonin 6 milliGRAM(s) Oral at bedtime  metoprolol tartrate 12.5 milliGRAM(s) Oral two times a day  pantoprazole    Tablet 40 milliGRAM(s) Oral every 12 hours  sucralfate suspension 1 Gram(s) Oral every 6 hours      Allergies    No Known Allergies    Intolerances            Vital Signs Last 24 Hrs  T(C): 36.7 (14 Jun 2021 04:00), Max: 36.8 (13 Jun 2021 12:02)  T(F): 98.1 (14 Jun 2021 04:00), Max: 98.3 (13 Jun 2021 12:02)  HR: 75 (14 Jun 2021 08:00) (66 - 98)  BP: 123/67 (14 Jun 2021 08:00) (113/69 - 138/69)  BP(mean): 82 (14 Jun 2021 08:00) (82 - 90)  RR: 14 (14 Jun 2021 08:00) (14 - 21)  SpO2: 96% (14 Jun 2021 08:00) (96% - 100%)    REVIEW OF SYSTEMS:  Constitutional:  The patient denies fever, chills, or night sweats.  Head:  No headache.  Eyes:  No double vision or blurry vision.  Ears:  No ringing in the ears.  Neck:  No neck pain.  Respiratory:  No shortness of breath.  Cardiovascular:  No chest pain.  Abdomen:  No nausea, vomiting, or abdominal pain.  Extremities/Neurological:  No numbness or tingling.  Musculoskeletal:  No joint pain.    PHYSICAL EXAMINATION:   HEENT:  Head:  Normocephalic, atraumatic.  Eyes:  No scleral icterus.  Ears:  Hearing bilaterally intact.  NECK:  Supple.  RESPIRATORY:  Good air entry bilaterally.  CARDIOVASCULAR:  S1 and S2 heard.  ABDOMEN:  Soft and nontender.  EXTREMITIES:  No clubbing or cyanosis was noted.      NEUROLOGIC:  The patient is awake, alert, and oriented x3.  Extraocular movements were intact.  Pupils were equal, round, and reactive bilaterally 3 mm to 2 mm.  Speech was fluent.  Smile was symmetric.  Motor:  Bilateral upper were 5/5, does have slightly decreased range of motion of the right shoulder, but has a history of injury there.  Bilateral lower extremities were 4+/5.  Sensory:  Bilateral upper and lower intact to light touch.                       LABS:  CBC Full  -  ( 13 Jun 2021 06:42 )  WBC Count : 7.88 K/uL  RBC Count : 3.09 M/uL  Hemoglobin : 9.6 g/dL  Hematocrit : 28.7 %  Platelet Count - Automated : 178 K/uL  Mean Cell Volume : 92.9 fl  Mean Cell Hemoglobin : 31.1 pg  Mean Cell Hemoglobin Concentration : 33.4 gm/dL  Auto Neutrophil # : x  Auto Lymphocyte # : x  Auto Monocyte # : x  Auto Eosinophil # : x  Auto Basophil # : x  Auto Neutrophil % : x  Auto Lymphocyte % : x  Auto Monocyte % : x  Auto Eosinophil % : x  Auto Basophil % : x      06-13    144  |  112<H>  |  8   ----------------------------<  148<H>  3.6   |  23  |  0.90    Ca    8.1<L>      13 Jun 2021 06:42  Phos  3.4     06-13  Mg     2.1     06-13      Hemoglobin A1C:       Vitamin B12         RADIOLOGY      ANALYSIS AND PLAN:  This is a 71-year-old with episodes of dizziness, ataxia, feeling weak all over.  The clinical impression is most likely presyncopal event leading to cerebral hypoperfusion secondary to underlying volume depletion with possibly GI issues, suspect less likely this was a TIA or a subclinical seizure event.  Telemetry evaluation.  Please maintain systolic blood pressure greater than 100 to help maintain cerebral perfusion.  For diabetes, strict control of blood sugars.  For history of high cholesterol, continue the patient on statin.  monitor H/H as needed   fall precautions     Greater than 40 minutes of time was spent with the patient, plan of care, reviewing data, speaking to the multidisciplinary healthcare team with greater than 50% of that time in counseling and care coordination.

## 2021-06-14 NOTE — PROGRESS NOTE ADULT - ASSESSMENT
Pt with UGI bleed likely due to Eliquis.  Multiple duodenal ulcers.   Stable Hgb.   Ambulate  GI fu. tolerated diet.   Rapid AF/Flutter.  Anemia.    D/w PA and nursing in detail  Prognosis guarded.  Pt. to be dced.  Advised fu GI.  FU Cardiologist Niranjan who will decide on resuming anticoagulation vs. Watchman.

## 2021-06-14 NOTE — PROGRESS NOTE ADULT - ASSESSMENT
The patient is a 71 year old male with a history of HL, DM, CVA, atrial fibrillation who is admitted with GI bleed, rapid atrial flutter.    Plan:  - Tachycardia improved  - Monitor hemoglobin  - Continue metoprolol tartrate 12.5 mg bid  - Hold apixaban - given prior history of CVA, will eventually need to be restarted vs. consideration of Watchman as outpatient  - GI follow-up  - Discharge planning

## 2021-06-14 NOTE — DISCHARGE NOTE NURSING/CASE MANAGEMENT/SOCIAL WORK - PATIENT PORTAL LINK FT
You can access the FollowMyHealth Patient Portal offered by [facility] by registering at the following website: [email or web address]. By joining [employer]’s FollowMyHealth portal, you will also be able to view your health information using other applications (apps) compatible with our system.

## 2021-06-14 NOTE — PROGRESS NOTE ADULT - SUBJECTIVE AND OBJECTIVE BOX
Chief Complaint:  Patient is a 71y old  Male who presents with a chief complaint of GI bleed/dizziness (14 Jun 2021 08:07)      Interval Events:   no o/n events  Hgb stable x48 hours    Hospital Medications:  atorvastatin 40 milliGRAM(s) Oral at bedtime  dextrose 40% Gel 15 Gram(s) Oral once  dextrose 5%. 1000 milliLiter(s) IV Continuous <Continuous>  dextrose 5%. 1000 milliLiter(s) IV Continuous <Continuous>  dextrose 50% Injectable 25 Gram(s) IV Push once  dextrose 50% Injectable 12.5 Gram(s) IV Push once  dextrose 50% Injectable 25 Gram(s) IV Push once  glucagon  Injectable 1 milliGRAM(s) IntraMuscular once  insulin lispro (ADMELOG) corrective regimen sliding scale   SubCutaneous Before meals and at bedtime  melatonin 6 milliGRAM(s) Oral at bedtime  metoprolol tartrate 12.5 milliGRAM(s) Oral two times a day  pantoprazole    Tablet 40 milliGRAM(s) Oral every 12 hours  sucralfate suspension 1 Gram(s) Oral every 6 hours        PHYSICAL EXAM:   Vital Signs:  Vital Signs Last 24 Hrs  T(C): 36.7 (14 Jun 2021 04:00), Max: 36.8 (13 Jun 2021 12:02)  T(F): 98.1 (14 Jun 2021 04:00), Max: 98.3 (13 Jun 2021 12:02)  HR: 75 (14 Jun 2021 08:00) (66 - 98)  BP: 123/67 (14 Jun 2021 08:00) (113/69 - 138/69)  BP(mean): 82 (14 Jun 2021 08:00) (82 - 90)  RR: 14 (14 Jun 2021 08:00) (14 - 21)  SpO2: 96% (14 Jun 2021 08:00) (96% - 100%)  Daily     Daily     GENERAL:  Appears stated age,  no distress  HEENT:   sclera -anicteric  CHEST:   no increased effort, breath sounds clear  HEART:  Regular rhythm, S1, S2,   ABDOMEN:  Soft, non-tender, non-distended, normoactive bowel sounds,    EXTREMITIES:  no cyanosis, clubbing or edema  SKIN:  No rash/no jaundice   NEURO:  Alert, oriented    LABS:                        9.6    7.88  )-----------( 178      ( 13 Jun 2021 06:42 )             28.7       06-13    144  |  112<H>  |  8   ----------------------------<  148<H>  3.6   |  23  |  0.90    Ca    8.1<L>      13 Jun 2021 06:42  Phos  3.4     06-13  Mg     2.1     06-13                                    9.6    7.88  )-----------( 178      ( 13 Jun 2021 06:42 )             28.7                         9.4    8.64  )-----------( 139      ( 11 Jun 2021 20:23 )             27.7                         7.2    7.80  )-----------( 132      ( 11 Jun 2021 13:03 )             21.4     Imaging:

## 2021-06-14 NOTE — PROGRESS NOTE ADULT - REASON FOR ADMISSION
GI bleed/dizziness

## 2021-06-14 NOTE — PROGRESS NOTE ADULT - PROVIDER SPECIALTY LIST ADULT
Cardiology
Gastroenterology
Critical Care
Critical Care
Gastroenterology
Hospitalist
Neurology
Cardiology
Cardiology
Hospitalist
Hospitalist
Cardiology
Critical Care
Critical Care
Neurology
Hospitalist
Pulmonology
Pulmonology
Critical Care

## 2021-06-14 NOTE — PROGRESS NOTE ADULT - SUBJECTIVE AND OBJECTIVE BOX
Chief Complaint: Dizziness    Interval Events: No events overnight.    Review of Systems:  General: No fevers, chills, weight loss or gain  Skin: No rashes, color changes  Cardiovascular: No chest pain, orthopnea  Respiratory: No shortness of breath, cough  Gastrointestinal: No nausea, abdominal pain  Genitourinary: No incontinence, pain with urination  Musculoskeletal: No pain, swelling, decreased range of motion  Neurological: No headache, weakness  Psychiatric: No depression, anxiety  Endocrine: No weight loss or gain, increased thirst  All other systems are comprehensively negative.    Physical Exam:  Vital Signs Last 24 Hrs  T(C): 36.7 (14 Jun 2021 04:00), Max: 36.8 (13 Jun 2021 12:02)  T(F): 98.1 (14 Jun 2021 04:00), Max: 98.3 (13 Jun 2021 12:02)  HR: 75 (14 Jun 2021 08:00) (66 - 98)  BP: 123/67 (14 Jun 2021 08:00) (113/69 - 138/69)  BP(mean): 82 (14 Jun 2021 08:00) (82 - 90)  RR: 14 (14 Jun 2021 08:00) (14 - 21)  SpO2: 96% (14 Jun 2021 08:00) (96% - 100%)  General: NAD  HEENT: MMM  Neck: No JVD, no carotid bruit  Lungs: CTAB  CV: RRR, nl S1/S2, no M/R/G  Abdomen: S/NT/ND, +BS  Extremities: No LE edema, no cyanosis  Neuro: AAOx3, non-focal  Skin: No rash    Labs:             06-13    144  |  112<H>  |  8   ----------------------------<  148<H>  3.6   |  23  |  0.90    Ca    8.1<L>      13 Jun 2021 06:42  Phos  3.4     06-13  Mg     2.1     06-13                          9.6    7.88  )-----------( 178      ( 13 Jun 2021 06:42 )             28.7       Telemetry: AFL with variable block

## 2021-06-14 NOTE — PROGRESS NOTE ADULT - NSICDXPILOT_GEN_ALL_CORE
[place]

## 2021-06-15 RX ORDER — OMEPRAZOLE 40 MG/1
40 CAPSULE, DELAYED RELEASE ORAL
Qty: 30 | Refills: 3 | Status: DISCONTINUED | COMMUNITY
Start: 2019-10-28 | End: 2021-06-15

## 2021-06-18 ENCOUNTER — APPOINTMENT (OUTPATIENT)
Dept: INTERNAL MEDICINE | Facility: CLINIC | Age: 71
End: 2021-06-18
Payer: MEDICARE

## 2021-06-18 VITALS
WEIGHT: 179 LBS | OXYGEN SATURATION: 99 % | DIASTOLIC BLOOD PRESSURE: 62 MMHG | SYSTOLIC BLOOD PRESSURE: 118 MMHG | RESPIRATION RATE: 16 BRPM | TEMPERATURE: 97.9 F | BODY MASS INDEX: 27.13 KG/M2 | HEIGHT: 68 IN | HEART RATE: 72 BPM

## 2021-06-18 DIAGNOSIS — Z09 ENCOUNTER FOR FOLLOW-UP EXAMINATION AFTER COMPLETED TREATMENT FOR CONDITIONS OTHER THAN MALIGNANT NEOPLASM: ICD-10-CM

## 2021-06-18 PROCEDURE — 99214 OFFICE O/P EST MOD 30 MIN: CPT

## 2021-06-18 PROCEDURE — 99072 ADDL SUPL MATRL&STAF TM PHE: CPT

## 2021-06-18 RX ORDER — SUCRALFATE 1 G/10ML
1 SUSPENSION ORAL 4 TIMES DAILY
Refills: 0 | Status: DISCONTINUED | COMMUNITY
Start: 2021-06-15 | End: 2021-06-18

## 2021-06-18 RX ORDER — HYDROMORPHONE HYDROCHLORIDE 4 MG/1
4 TABLET ORAL TWICE DAILY
Qty: 60 | Refills: 0 | Status: DISCONTINUED | COMMUNITY
Start: 2021-05-04 | End: 2021-06-18

## 2021-06-18 NOTE — HISTORY OF PRESENT ILLNESS
[Post-hospitalization from ___ Hospital] : Post-hospitalization from [unfilled] Hospital [FreeTextEntry2] : GIB Duodenal ulcer\par Offeliquis now

## 2021-06-22 ENCOUNTER — NON-APPOINTMENT (OUTPATIENT)
Age: 71
End: 2021-06-22

## 2021-06-26 LAB
BASOPHILS # BLD AUTO: 0.05 K/UL
BASOPHILS NFR BLD AUTO: 0.8 %
EOSINOPHIL # BLD AUTO: 0.1 K/UL
EOSINOPHIL NFR BLD AUTO: 1.5 %
FERRITIN SERPL-MCNC: 132 NG/ML
FOLATE SERPL-MCNC: 8.6 NG/ML
HCT VFR BLD CALC: 30.7 %
HGB BLD-MCNC: 10 G/DL
IMM GRANULOCYTES NFR BLD AUTO: 0 %
IRON SATN MFR SERPL: 13 %
IRON SERPL-MCNC: 45 UG/DL
LYMPHOCYTES # BLD AUTO: 2.13 K/UL
LYMPHOCYTES NFR BLD AUTO: 32.2 %
MAN DIFF?: NORMAL
MCHC RBC-ENTMCNC: 31.3 PG
MCHC RBC-ENTMCNC: 32.6 GM/DL
MCV RBC AUTO: 95.9 FL
MONOCYTES # BLD AUTO: 0.44 K/UL
MONOCYTES NFR BLD AUTO: 6.6 %
NEUTROPHILS # BLD AUTO: 3.9 K/UL
NEUTROPHILS NFR BLD AUTO: 58.9 %
PLATELET # BLD AUTO: 394 K/UL
RBC # BLD: 3.2 M/UL
RBC # FLD: 15 %
TIBC SERPL-MCNC: 358 UG/DL
UIBC SERPL-MCNC: 313 UG/DL
VIT B12 SERPL-MCNC: 698 PG/ML
WBC # FLD AUTO: 6.62 K/UL

## 2021-07-12 ENCOUNTER — APPOINTMENT (OUTPATIENT)
Dept: CARDIOLOGY | Facility: CLINIC | Age: 71
End: 2021-07-12
Payer: MEDICARE

## 2021-07-12 ENCOUNTER — NON-APPOINTMENT (OUTPATIENT)
Age: 71
End: 2021-07-12

## 2021-07-12 VITALS
DIASTOLIC BLOOD PRESSURE: 78 MMHG | WEIGHT: 176 LBS | BODY MASS INDEX: 26.67 KG/M2 | HEIGHT: 68 IN | RESPIRATION RATE: 16 BRPM | SYSTOLIC BLOOD PRESSURE: 126 MMHG | OXYGEN SATURATION: 99 % | HEART RATE: 64 BPM

## 2021-07-12 PROCEDURE — 99072 ADDL SUPL MATRL&STAF TM PHE: CPT

## 2021-07-12 PROCEDURE — 99215 OFFICE O/P EST HI 40 MIN: CPT

## 2021-07-12 PROCEDURE — 93000 ELECTROCARDIOGRAM COMPLETE: CPT

## 2021-07-12 NOTE — REASON FOR VISIT
[Arrhythmia/ECG Abnorrmalities] : arrhythmia/ECG abnormalities [Spouse] : spouse [FreeTextEntry1] : Patient seen for follow-up and discussion.  Discharge summary reviewed.  Gastroenterology note reviewed.  Discussed with wife and patient.  Patient was hospitalized in [place] with GI bleeding due to ulcer.  He needed 4 units of blood.  Oral anticoagulation has been discontinued at this time.\par \par History of prior stroke and atrial fibrillation.\par \par He feels very well has no chest pain shortness of breath palpitations.  Has modified his diet and drinking habits.

## 2021-07-12 NOTE — CARDIOLOGY SUMMARY
[de-identified] : July 12, 2021 atrial fib flutter controlled ventricular response [de-identified] : June 2017, normal nuclear stress [de-identified] : 2016 aortic valve calcification with normal opening normal LV systolic function

## 2021-07-12 NOTE — ASSESSMENT
[FreeTextEntry1] : Status post gastrointestinal bleeding upper, related to ulcer.  History of atrial fibrillation and flutter, prior stroke.  Mild aortic valve disease

## 2021-07-12 NOTE — DISCUSSION/SUMMARY
[FreeTextEntry1] : Discussed extensively with patient and wife.  I recommended that he have follow-up gastro enterology evaluation to see if it is safe for him to resume oral anticoagulation and do so expeditiously.  The risks and benefits options were discussed.  Issues related to ablation antiarrhythmic therapy and left atrial occlusion device watchman device were also discussed.  Questions were addressed and answered to the patient and wife satisfaction.  They will get in touch with the GI office later today and get back to me as soon as feasible after evaluation there.

## 2021-07-12 NOTE — PHYSICAL EXAM

## 2021-07-27 ENCOUNTER — APPOINTMENT (OUTPATIENT)
Dept: GASTROENTEROLOGY | Facility: CLINIC | Age: 71
End: 2021-07-27

## 2021-07-27 LAB
25(OH)D3 SERPL-MCNC: 42.1 NG/ML
ALBUMIN SERPL ELPH-MCNC: 4.4 G/DL
ALP BLD-CCNC: 59 U/L
ALT SERPL-CCNC: 10 U/L
ANION GAP SERPL CALC-SCNC: 12 MMOL/L
APPEARANCE: CLEAR
AST SERPL-CCNC: 16 U/L
BACTERIA: NEGATIVE
BASOPHILS # BLD AUTO: 0.03 K/UL
BASOPHILS NFR BLD AUTO: 0.5 %
BILIRUB SERPL-MCNC: 0.5 MG/DL
BILIRUBIN URINE: NEGATIVE
BLOOD URINE: NEGATIVE
BUN SERPL-MCNC: 9 MG/DL
CALCIUM SERPL-MCNC: 9.5 MG/DL
CHLORIDE SERPL-SCNC: 106 MMOL/L
CHOLEST SERPL-MCNC: 126 MG/DL
CO2 SERPL-SCNC: 23 MMOL/L
COLOR: YELLOW
CREAT SERPL-MCNC: 1.08 MG/DL
CREAT SPEC-SCNC: 325 MG/DL
CRP SERPL HS-MCNC: 0.82 MG/L
CRP SERPL-MCNC: <3 MG/L
EOSINOPHIL # BLD AUTO: 0.09 K/UL
EOSINOPHIL NFR BLD AUTO: 1.5 %
ERYTHROCYTE [SEDIMENTATION RATE] IN BLOOD BY WESTERGREN METHOD: 17 MM/HR
ESTIMATED AVERAGE GLUCOSE: 108 MG/DL
FERRITIN SERPL-MCNC: 65 NG/ML
GLUCOSE QUALITATIVE U: NEGATIVE
GLUCOSE SERPL-MCNC: 104 MG/DL
HBA1C MFR BLD HPLC: 5.4 %
HCT VFR BLD CALC: 37.7 %
HDLC SERPL-MCNC: 37 MG/DL
HGB BLD-MCNC: 11.9 G/DL
HYALINE CASTS: 0 /LPF
IMM GRANULOCYTES NFR BLD AUTO: 0.2 %
IRON SATN MFR SERPL: 17 %
IRON SERPL-MCNC: 58 UG/DL
KETONES URINE: NEGATIVE
LDLC SERPL CALC-MCNC: 66 MG/DL
LEUKOCYTE ESTERASE URINE: ABNORMAL
LYMPHOCYTES # BLD AUTO: 2.64 K/UL
LYMPHOCYTES NFR BLD AUTO: 43 %
MAN DIFF?: NORMAL
MCHC RBC-ENTMCNC: 29.8 PG
MCHC RBC-ENTMCNC: 31.6 GM/DL
MCV RBC AUTO: 94.3 FL
MICROALBUMIN 24H UR DL<=1MG/L-MCNC: 1.8 MG/DL
MICROALBUMIN/CREAT 24H UR-RTO: 6 MG/G
MICROSCOPIC-UA: NORMAL
MONOCYTES # BLD AUTO: 0.46 K/UL
MONOCYTES NFR BLD AUTO: 7.5 %
NEUTROPHILS # BLD AUTO: 2.91 K/UL
NEUTROPHILS NFR BLD AUTO: 47.3 %
NITRITE URINE: NEGATIVE
NONHDLC SERPL-MCNC: 89 MG/DL
PH URINE: 6.5
PLATELET # BLD AUTO: 270 K/UL
POTASSIUM SERPL-SCNC: 4.4 MMOL/L
PROT SERPL-MCNC: 6.7 G/DL
PROTEIN URINE: ABNORMAL
PSA SERPL-MCNC: 1.32 NG/ML
RBC # BLD: 4 M/UL
RBC # BLD: 4 M/UL
RBC # FLD: 14.3 %
RED BLOOD CELLS URINE: 4 /HPF
RETICS # AUTO: 1.2 %
RETICS AGGREG/RBC NFR: 46.4 K/UL
SODIUM SERPL-SCNC: 141 MMOL/L
SPECIFIC GRAVITY URINE: 1.03
SQUAMOUS EPITHELIAL CELLS: 4 /HPF
TIBC SERPL-MCNC: 339 UG/DL
TRIGL SERPL-MCNC: 115 MG/DL
TSH SERPL-ACNC: 4.68 UIU/ML
UIBC SERPL-MCNC: 281 UG/DL
UROBILINOGEN URINE: ABNORMAL
WBC # FLD AUTO: 6.14 K/UL
WHITE BLOOD CELLS URINE: 23 /HPF

## 2021-07-29 ENCOUNTER — APPOINTMENT (OUTPATIENT)
Dept: INTERNAL MEDICINE | Facility: CLINIC | Age: 71
End: 2021-07-29
Payer: MEDICARE

## 2021-07-29 VITALS
BODY MASS INDEX: 26.67 KG/M2 | HEART RATE: 64 BPM | SYSTOLIC BLOOD PRESSURE: 120 MMHG | OXYGEN SATURATION: 99 % | WEIGHT: 176 LBS | HEIGHT: 68 IN | DIASTOLIC BLOOD PRESSURE: 70 MMHG | RESPIRATION RATE: 16 BRPM | TEMPERATURE: 97.5 F

## 2021-07-29 DIAGNOSIS — M75.02 ADHESIVE CAPSULITIS OF LEFT SHOULDER: ICD-10-CM

## 2021-07-29 DIAGNOSIS — D62 ACUTE POSTHEMORRHAGIC ANEMIA: ICD-10-CM

## 2021-07-29 PROCEDURE — 99072 ADDL SUPL MATRL&STAF TM PHE: CPT

## 2021-07-29 PROCEDURE — 99214 OFFICE O/P EST MOD 30 MIN: CPT

## 2021-07-29 RX ORDER — SUCRALFATE 1 G/1
1 TABLET ORAL
Qty: 120 | Refills: 1 | Status: DISCONTINUED | COMMUNITY
Start: 2021-06-29 | End: 2021-07-29

## 2021-07-29 RX ORDER — METOPROLOL SUCCINATE 25 MG/1
25 TABLET, EXTENDED RELEASE ORAL
Qty: 30 | Refills: 2 | Status: DISCONTINUED | COMMUNITY
Start: 2021-06-15 | End: 2021-07-29

## 2021-08-16 ENCOUNTER — APPOINTMENT (OUTPATIENT)
Dept: ORTHOPEDIC SURGERY | Facility: CLINIC | Age: 71
End: 2021-08-16
Payer: MEDICARE

## 2021-08-16 VITALS — HEIGHT: 68 IN | BODY MASS INDEX: 25.46 KG/M2 | WEIGHT: 168 LBS

## 2021-08-16 DIAGNOSIS — M75.01 ADHESIVE CAPSULITIS OF RIGHT SHOULDER: ICD-10-CM

## 2021-08-16 PROCEDURE — 20610 DRAIN/INJ JOINT/BURSA W/O US: CPT | Mod: RT

## 2021-08-16 PROCEDURE — 99214 OFFICE O/P EST MOD 30 MIN: CPT | Mod: 25

## 2021-08-16 NOTE — DISCUSSION/SUMMARY
[de-identified] : The underlying pathophysiology was reviewed in great detail with the patient as well as the various treatment options, including ice, analgesics, NSAIDs, Physical therapy, steroid injections \par \par MRI of the right shoulder was reviewed and discussed again in great detail today. \par \par Activity modifications and restrictions were discussed. I advised avoiding overhead lifting. I advised the patient to work on good posture.\par \par A home exercise sheet was given and discussed with the patient to follow.A Thera-Band was provided for exercise program. \par \par FU 6 weeks. \par \par All questions were answered, all alternatives discussed and the patient is in complete agreement with that plan. Follow-up appointment as instructed. Any issues and the patient will call or come in sooner.

## 2021-08-16 NOTE — PHYSICAL EXAM
[de-identified] : Right Upper Extremity\par o Shoulder :\par ¦ Inspection/Palpation : tenderness over the greater tuberosity and proximal biceps tendon and biceps musculature, mild acromioclavicular joint tenderness, no  tenderness anterior and posterior glenohumeral joint,no swelling, no deformities\par ¦ Range of Motion : ACTIVE FORWARD ELEVATION: Measured at 100 degrees, ACTIVE EXTERNAL ROTATION: Measured at 30 degrees, ACTIVE INTERNAL ROTATION: Measured at PSIS\par ¦ Strength : external rotation 5/5, internal rotation 5/5, supraspinatus 5/5 \par ¦ Stability : no joint instability on provocative testing\par ¦ Tests/Signs : Neer (+), Hanson (+), Speed’s Test (+) Yergason’s Test (+)\par o Upper Arm : no tenderness, no swelling, no deformities\par o Muscle Bulk : no atrophy\par o Sensation : sensation intact to light touch\par o Skin : no skin rash or discoloration\par o Vascular Exam : no edema, no cyanosis, radial and ulnar pulses normal\par \par Left Upper Extremity\par o Shoulder :\par ¦ Inspection/Palpation:  no tenderness over the greater tuberosity, no acromioclavicular joint tenderness, no tenderness anterior and posterior glenohumeral joint,no swelling, no deformities\par ¦ Range of Motion : ACTIVE FORWARD ELEVATION: Measured at 120 degrees, ACTIVE EXTERNAL ROTATION: Measured at 50 degrees, ACTIVE INTERNAL ROTATION: Measured at PSIS\par ¦ Strength : external rotation 5/5, internal rotation 5/5, supraspinatus 5/5\par ¦ Stability : no joint instability on provocative testing\par ¦ Tests/Signs : Neer (-), Hanson (-)\par o Upper Arm : no tenderness, no swelling, no deformities\par o Muscle Bulk : no atrophy\par o Sensation : sensation intact to light touch\par o Skin : no skin rash or discoloration\par o Vascular Exam : no edema, no cyanosis, radial and ulnar pulses normal [de-identified] : ----------------------------------------------------------------------------------------------------------------------------------------------------------------------------------- \par Patient comes to today's visit with outside imaging already performed. I reviewed the images in detail with the patient and discussed the findings as highlighted below.\par \par o MRI of the right shoulder performed on 05/11/2021 at [facility]: impression: \par ¦ MRI findings suggestive of adhesive capsulitis.\par ¦ Mild supraspinatus and subscapularis tendinosis with mild undersurface partial tearing of the supraspinatus and mild undersurface fraying of the infraspinatus.\par ¦ Moderate to severe AC joint arthrosis with mild associated subacromial subdeltoid bursitis.

## 2021-08-16 NOTE — PROCEDURE
[de-identified] :  At this point I recommended a therapeutic injection and under sterile precautions an injection of 0.5 cc Kenalog, 0.5 cc Dexamethasone and 4 cc 1% lidocaine were injected into the right glenohumeral joint without complication.\par

## 2021-08-16 NOTE — HISTORY OF PRESENT ILLNESS
[de-identified] : AMY BREWER is a 71 year old RHD male presenting to the office complaining of right shoulder pain.  Patient reports pain since November 2020. He reports reaching back while laying in bed to pet his dog noting a loud, audible pop in his shoulder associated with pain. Patient notes continued pain ever since. \par At his last visit on 05/20/2021 patient received a corticosteroid injection of the glenohumeral joint noting minimal relief in symptoms. He notes an exacerbation of symptoms after lifitng a heavy object last week as well. The patient describes the pain as a dull aching, and occasionally sharp pain localized to the anterior aspect of his right shoulder that is intermittent in nature. His  symptoms are exacerbated  with any movement of the shoulder. Patient reports the pain is waking him up at night.  Patient reports associated weakness. Denies numbness and tingling in the upper extremity. Patient notes a history of left shoulder adhesive capsulitis. patient notes he was seen by Dr. Soriano and was given a corticosteroid injection and his symptoms resolved in approximately one year. Patient is taking Hydromorphone, and oxycodone as prescribed by his PCP for pain relief with moderate nighttime relief in symptoms. Of note patient is on Eliquis. Patient denies any other complaints at this time. Patient is here today for MRI review of the right shoulder

## 2021-09-26 ENCOUNTER — TRANSCRIPTION ENCOUNTER (OUTPATIENT)
Age: 71
End: 2021-09-26

## 2021-09-26 LAB
APPEARANCE: CLEAR
BASOPHILS # BLD AUTO: 0.04 K/UL
BASOPHILS NFR BLD AUTO: 0.5 %
BILIRUBIN URINE: NEGATIVE
BLOOD URINE: NEGATIVE
COLOR: NORMAL
EOSINOPHIL # BLD AUTO: 0.07 K/UL
EOSINOPHIL NFR BLD AUTO: 0.9 %
GLUCOSE QUALITATIVE U: NEGATIVE
HCT VFR BLD CALC: 41.4 %
HGB BLD-MCNC: 13.4 G/DL
IMM GRANULOCYTES NFR BLD AUTO: 0.1 %
KETONES URINE: NEGATIVE
LEUKOCYTE ESTERASE URINE: NEGATIVE
LYMPHOCYTES # BLD AUTO: 2.39 K/UL
LYMPHOCYTES NFR BLD AUTO: 31.5 %
MAN DIFF?: NORMAL
MCHC RBC-ENTMCNC: 30.8 PG
MCHC RBC-ENTMCNC: 32.4 GM/DL
MCV RBC AUTO: 95.2 FL
MONOCYTES # BLD AUTO: 0.49 K/UL
MONOCYTES NFR BLD AUTO: 6.5 %
NEUTROPHILS # BLD AUTO: 4.59 K/UL
NEUTROPHILS NFR BLD AUTO: 60.5 %
NITRITE URINE: NEGATIVE
PH URINE: 7
PLATELET # BLD AUTO: 262 K/UL
PROTEIN URINE: NEGATIVE
RBC # BLD: 4.35 M/UL
RBC # FLD: 14.9 %
SPECIFIC GRAVITY URINE: 1.01
TSH SERPL-ACNC: 3.15 UIU/ML
UROBILINOGEN URINE: NORMAL
WBC # FLD AUTO: 7.59 K/UL

## 2021-10-01 ENCOUNTER — APPOINTMENT (OUTPATIENT)
Dept: INTERNAL MEDICINE | Facility: CLINIC | Age: 71
End: 2021-10-01
Payer: MEDICARE

## 2021-10-01 VITALS
WEIGHT: 176 LBS | BODY MASS INDEX: 26.67 KG/M2 | HEIGHT: 68 IN | DIASTOLIC BLOOD PRESSURE: 80 MMHG | RESPIRATION RATE: 16 BRPM | SYSTOLIC BLOOD PRESSURE: 132 MMHG | OXYGEN SATURATION: 99 % | HEART RATE: 65 BPM | TEMPERATURE: 97.2 F

## 2021-10-01 DIAGNOSIS — R21 RASH AND OTHER NONSPECIFIC SKIN ERUPTION: ICD-10-CM

## 2021-10-01 DIAGNOSIS — M25.511 PAIN IN RIGHT SHOULDER: ICD-10-CM

## 2021-10-01 DIAGNOSIS — G89.29 PAIN IN RIGHT SHOULDER: ICD-10-CM

## 2021-10-01 DIAGNOSIS — D64.9 ANEMIA, UNSPECIFIED: ICD-10-CM

## 2021-10-01 PROCEDURE — 99214 OFFICE O/P EST MOD 30 MIN: CPT

## 2021-10-01 RX ORDER — TRIAMCINOLONE ACETONIDE 1 MG/G
0.1 CREAM TOPICAL TWICE DAILY
Qty: 1 | Refills: 5 | Status: ACTIVE | COMMUNITY
Start: 2021-10-01 | End: 1900-01-01

## 2021-10-01 RX ORDER — IRON/IRON ASP GLY/FA/MV-MIN 38 125-25-1MG
TABLET ORAL
Refills: 0 | Status: DISCONTINUED | COMMUNITY

## 2021-10-01 NOTE — HISTORY OF PRESENT ILLNESS
[FreeTextEntry1] : anemia, HLD, GIB  [de-identified] : No further bleeding\par Saw GI\par \par \par \par

## 2021-12-30 NOTE — PROGRESS NOTE ADULT - SUBJECTIVE AND OBJECTIVE BOX
[place] Date/Time Patient Seen:  		  Referring MD:   Data Reviewed	       Patient is a 71y old  Male who presents with a chief complaint of GI bleed/dizziness (12 Jun 2021 20:11)      Subjective/HPI     PAST MEDICAL & SURGICAL HISTORY:  GERD (gastroesophageal reflux disease)    Type 2 diabetes mellitus    Hyperlipidemia    Cerebrovascular accident (CVA)    Adhesive capsulitis of right shoulder    H/O pilonidal cyst          Medication list         MEDICATIONS  (STANDING):  atorvastatin 40 milliGRAM(s) Oral at bedtime  dextrose 40% Gel 15 Gram(s) Oral once  dextrose 5% + lactated ringers. 1000 milliLiter(s) (50 mL/Hr) IV Continuous <Continuous>  dextrose 5%. 1000 milliLiter(s) (50 mL/Hr) IV Continuous <Continuous>  dextrose 5%. 1000 milliLiter(s) (100 mL/Hr) IV Continuous <Continuous>  dextrose 50% Injectable 25 Gram(s) IV Push once  dextrose 50% Injectable 12.5 Gram(s) IV Push once  dextrose 50% Injectable 25 Gram(s) IV Push once  glucagon  Injectable 1 milliGRAM(s) IntraMuscular once  insulin lispro (ADMELOG) corrective regimen sliding scale   SubCutaneous Before meals and at bedtime  metoprolol tartrate 12.5 milliGRAM(s) Oral two times a day  pantoprazole Infusion 8 mG/Hr (10 mL/Hr) IV Continuous <Continuous>  sucralfate suspension 1 Gram(s) Oral every 6 hours    MEDICATIONS  (PRN):  traMADol 25 milliGRAM(s) Oral every 8 hours PRN Moderate Pain (4 - 6)         Vitals log        ICU Vital Signs Last 24 Hrs  T(C): 36.7 (13 Jun 2021 04:03), Max: 37.1 (12 Jun 2021 19:24)  T(F): 98.1 (13 Jun 2021 04:03), Max: 98.8 (12 Jun 2021 19:24)  HR: 82 (13 Jun 2021 06:00) (66 - 96)  BP: 123/71 (13 Jun 2021 06:00) (107/73 - 162/80)  BP(mean): 88 (13 Jun 2021 06:00) (73 - 113)  ABP: --  ABP(mean): --  RR: 21 (13 Jun 2021 06:00) (10 - 21)  SpO2: 98% (13 Jun 2021 06:00) (98% - 100%)           Input and Output:  I&O's Detail    11 Jun 2021 07:01  -  12 Jun 2021 07:00  --------------------------------------------------------  IN:    dextrose 5% + lactated ringers: 1425 mL    Lactated Ringers: 375 mL    Pantoprazole: 240 mL    PRBCs (Packed Red Blood Cells): 300 mL  Total IN: 2340 mL    OUT:    Estimated Blood Loss (mL): 0 mL    Voided (mL): 890 mL  Total OUT: 890 mL    Total NET: 1450 mL      12 Jun 2021 07:01  -  13 Jun 2021 06:49  --------------------------------------------------------  IN:    dextrose 5% + lactated ringers: 1325 mL    IV PiggyBack: 400 mL    Oral Fluid: 540 mL    Pantoprazole: 250 mL  Total IN: 2515 mL    OUT:    Voided (mL): 3375 mL  Total OUT: 3375 mL    Total NET: -860 mL          Lab Data                        9.6    7.88  )-----------( 178      ( 13 Jun 2021 06:42 )             28.7     06-12    142  |  112<H>  |  17  ----------------------------<  154<H>  3.4<L>   |  22  |  0.96    Ca    7.7<L>      12 Jun 2021 06:40  Phos  3.1     06-12  Mg     2.1     06-12              Review of Systems	      Objective     Physical Examination    heart s1s2  lung dec BS  abd soft  head nc      Pertinent Lab findings & Imaging      Gege:  NO   Adequate UO     I&O's Detail    11 Jun 2021 07:01  -  12 Jun 2021 07:00  --------------------------------------------------------  IN:    dextrose 5% + lactated ringers: 1425 mL    Lactated Ringers: 375 mL    Pantoprazole: 240 mL    PRBCs (Packed Red Blood Cells): 300 mL  Total IN: 2340 mL    OUT:    Estimated Blood Loss (mL): 0 mL    Voided (mL): 890 mL  Total OUT: 890 mL    Total NET: 1450 mL      12 Jun 2021 07:01  -  13 Jun 2021 06:49  --------------------------------------------------------  IN:    dextrose 5% + lactated ringers: 1325 mL    IV PiggyBack: 400 mL    Oral Fluid: 540 mL    Pantoprazole: 250 mL  Total IN: 2515 mL    OUT:    Voided (mL): 3375 mL  Total OUT: 3375 mL    Total NET: -860 mL               Discussed with:     Cultures:	        Radiology

## 2022-02-01 ENCOUNTER — APPOINTMENT (OUTPATIENT)
Dept: INTERNAL MEDICINE | Facility: CLINIC | Age: 72
End: 2022-02-01
Payer: MEDICARE

## 2022-02-01 VITALS
RESPIRATION RATE: 16 BRPM | HEIGHT: 68 IN | OXYGEN SATURATION: 99 % | TEMPERATURE: 97.6 F | WEIGHT: 186 LBS | BODY MASS INDEX: 28.19 KG/M2 | SYSTOLIC BLOOD PRESSURE: 130 MMHG | DIASTOLIC BLOOD PRESSURE: 90 MMHG | HEART RATE: 63 BPM

## 2022-02-01 DIAGNOSIS — E55.9 VITAMIN D DEFICIENCY, UNSPECIFIED: ICD-10-CM

## 2022-02-01 PROCEDURE — G0439: CPT

## 2022-02-01 PROCEDURE — G0442 ANNUAL ALCOHOL SCREEN 15 MIN: CPT

## 2022-02-01 PROCEDURE — G0444 DEPRESSION SCREEN ANNUAL: CPT | Mod: 59

## 2022-02-01 NOTE — HEALTH RISK ASSESSMENT
[Good] : ~his/her~  mood as  good [Never] : Never [Yes] : Yes [2 - 3 times a week (3 pts)] : 2 - 3  times a week (3 points) [1 or 2 (0 pts)] : 1 or 2 (0 points) [Never (0 pts)] : Never (0 points) [No falls in past year] : Patient reported no falls in the past year [1] : 1) Little interest or pleasure doing things for several days (1) [0] : 2) Feeling down, depressed, or hopeless: Not at all (0) [PHQ-2 Positive] : PHQ-2 Positive [Patient reported colonoscopy was normal] : Patient reported colonoscopy was normal [HIV test declined] : HIV test declined [Hepatitis C test declined] : Hepatitis C test declined [None] : None [With Significant Other] : lives with significant other [] :  [Feels Safe at Home] : Feels safe at home [Fully functional (bathing, dressing, toileting, transferring, walking, feeding)] : Fully functional (bathing, dressing, toileting, transferring, walking, feeding) [Fully functional (using the telephone, shopping, preparing meals, housekeeping, doing laundry, using] : Fully functional and needs no help or supervision to perform IADLs (using the telephone, shopping, preparing meals, housekeeping, doing laundry, using transportation, managing medications and managing finances) [FreeTextEntry1] : doesn't have any "get up and go" [de-identified] : cardio [Audit-CScore] : 3 [de-identified] : physical activity [de-identified] : good [[id number]] : 1 [Change in mental status noted] : No change in mental status noted [Language] : denies difficulty with language [Behavior] : denies difficulty with behavior [Learning/Retaining New Information] : denies difficulty learning/retaining new information [Handling Complex Tasks] : denies difficulty handling complex tasks [Reasoning] : denies difficulty with reasoning [Spatial Ability and Orientation] : denies difficulty with spatial ability and orientation [Sexually Active] : not sexually active [Reports changes in hearing] : Reports no changes in hearing [Reports changes in vision] : Reports no changes in vision [Reports changes in dental health] : Reports no changes in dental health [ColonoscopyDate] : 2021 [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

## 2022-02-04 LAB
25(OH)D3 SERPL-MCNC: 29.8 NG/ML
ALBUMIN SERPL ELPH-MCNC: 4.6 G/DL
ALP BLD-CCNC: 50 U/L
ALT SERPL-CCNC: 13 U/L
ANION GAP SERPL CALC-SCNC: 11 MMOL/L
APPEARANCE: CLEAR
AST SERPL-CCNC: 16 U/L
BASOPHILS # BLD AUTO: 0.03 K/UL
BASOPHILS NFR BLD AUTO: 0.4 %
BILIRUB SERPL-MCNC: 0.7 MG/DL
BILIRUBIN URINE: NEGATIVE
BLOOD URINE: NEGATIVE
BUN SERPL-MCNC: 11 MG/DL
CALCIUM SERPL-MCNC: 9.4 MG/DL
CHLORIDE SERPL-SCNC: 105 MMOL/L
CHOLEST SERPL-MCNC: 139 MG/DL
CO2 SERPL-SCNC: 27 MMOL/L
COLOR: NORMAL
CREAT SERPL-MCNC: 1.05 MG/DL
EOSINOPHIL # BLD AUTO: 0.08 K/UL
EOSINOPHIL NFR BLD AUTO: 1 %
ESTIMATED AVERAGE GLUCOSE: 140 MG/DL
GLUCOSE QUALITATIVE U: NEGATIVE
GLUCOSE SERPL-MCNC: 116 MG/DL
HBA1C MFR BLD HPLC: 6.5 %
HCT VFR BLD CALC: 40.8 %
HDLC SERPL-MCNC: 45 MG/DL
HGB BLD-MCNC: 13.4 G/DL
IMM GRANULOCYTES NFR BLD AUTO: 0.1 %
KETONES URINE: NEGATIVE
LDLC SERPL CALC-MCNC: 74 MG/DL
LEUKOCYTE ESTERASE URINE: NEGATIVE
LYMPHOCYTES # BLD AUTO: 2.38 K/UL
LYMPHOCYTES NFR BLD AUTO: 30.2 %
MAN DIFF?: NORMAL
MCHC RBC-ENTMCNC: 32.4 PG
MCHC RBC-ENTMCNC: 32.8 GM/DL
MCV RBC AUTO: 98.8 FL
MONOCYTES # BLD AUTO: 0.66 K/UL
MONOCYTES NFR BLD AUTO: 8.4 %
NEUTROPHILS # BLD AUTO: 4.71 K/UL
NEUTROPHILS NFR BLD AUTO: 59.9 %
NITRITE URINE: NEGATIVE
NONHDLC SERPL-MCNC: 94 MG/DL
PH URINE: 6.5
PLATELET # BLD AUTO: 230 K/UL
POTASSIUM SERPL-SCNC: 5 MMOL/L
PROT SERPL-MCNC: 6.9 G/DL
PROTEIN URINE: NEGATIVE
PSA FREE FLD-MCNC: 65 %
PSA FREE SERPL-MCNC: 0.79 NG/ML
PSA SERPL-MCNC: 1.23 NG/ML
RBC # BLD: 4.13 M/UL
RBC # FLD: 12.8 %
SODIUM SERPL-SCNC: 144 MMOL/L
SPECIFIC GRAVITY URINE: 1.01
T4 FREE SERPL-MCNC: 1.1 NG/DL
TRIGL SERPL-MCNC: 100 MG/DL
TSH SERPL-ACNC: 4.22 UIU/ML
UROBILINOGEN URINE: NORMAL
VIT B12 SERPL-MCNC: 498 PG/ML
WBC # FLD AUTO: 7.87 K/UL

## 2022-08-12 ENCOUNTER — RX RENEWAL (OUTPATIENT)
Age: 72
End: 2022-08-12

## 2022-09-13 ENCOUNTER — APPOINTMENT (OUTPATIENT)
Dept: INTERNAL MEDICINE | Facility: CLINIC | Age: 72
End: 2022-09-13

## 2022-09-13 VITALS
DIASTOLIC BLOOD PRESSURE: 64 MMHG | HEIGHT: 68 IN | BODY MASS INDEX: 28.04 KG/M2 | OXYGEN SATURATION: 97 % | TEMPERATURE: 99.1 F | RESPIRATION RATE: 16 BRPM | SYSTOLIC BLOOD PRESSURE: 120 MMHG | HEART RATE: 79 BPM | WEIGHT: 185 LBS

## 2022-09-13 DIAGNOSIS — E78.2 MIXED HYPERLIPIDEMIA: ICD-10-CM

## 2022-09-13 DIAGNOSIS — E11.65 TYPE 2 DIABETES MELLITUS WITH OTHER CIRCULATORY COMPLICATIONS: ICD-10-CM

## 2022-09-13 DIAGNOSIS — R79.89 OTHER SPECIFIED ABNORMAL FINDINGS OF BLOOD CHEMISTRY: ICD-10-CM

## 2022-09-13 DIAGNOSIS — Z87.19 PERSONAL HISTORY OF OTHER DISEASES OF THE DIGESTIVE SYSTEM: ICD-10-CM

## 2022-09-13 DIAGNOSIS — E03.9 HYPOTHYROIDISM, UNSPECIFIED: ICD-10-CM

## 2022-09-13 DIAGNOSIS — M75.00 ADHESIVE CAPSULITIS OF UNSPECIFIED SHOULDER: ICD-10-CM

## 2022-09-13 DIAGNOSIS — K21.9 GASTRO-ESOPHAGEAL REFLUX DISEASE W/OUT ESOPHAGITIS: ICD-10-CM

## 2022-09-13 DIAGNOSIS — E11.59 TYPE 2 DIABETES MELLITUS WITH OTHER CIRCULATORY COMPLICATIONS: ICD-10-CM

## 2022-09-13 PROCEDURE — 99215 OFFICE O/P EST HI 40 MIN: CPT

## 2022-09-13 RX ORDER — DAPAGLIFLOZIN 5 MG/1
5 TABLET, FILM COATED ORAL DAILY
Qty: 30 | Refills: 3 | Status: DISCONTINUED | COMMUNITY
Start: 2022-02-01 | End: 2022-09-13

## 2022-09-15 LAB
25(OH)D3 SERPL-MCNC: 69.3 NG/ML
ALBUMIN SERPL ELPH-MCNC: 4.7 G/DL
ALP BLD-CCNC: 56 U/L
ALT SERPL-CCNC: 15 U/L
ANION GAP SERPL CALC-SCNC: 13 MMOL/L
APPEARANCE: CLEAR
AST SERPL-CCNC: 19 U/L
BASOPHILS # BLD AUTO: 0.04 K/UL
BASOPHILS NFR BLD AUTO: 0.5 %
BILIRUB SERPL-MCNC: 0.9 MG/DL
BILIRUBIN URINE: NEGATIVE
BLOOD URINE: NEGATIVE
BUN SERPL-MCNC: 10 MG/DL
CALCIUM SERPL-MCNC: 9.8 MG/DL
CHLORIDE SERPL-SCNC: 106 MMOL/L
CHOLEST SERPL-MCNC: 132 MG/DL
CO2 SERPL-SCNC: 25 MMOL/L
COLOR: NORMAL
CREAT SERPL-MCNC: 1.1 MG/DL
EGFR: 72 ML/MIN/1.73M2
EOSINOPHIL # BLD AUTO: 0.11 K/UL
EOSINOPHIL NFR BLD AUTO: 1.5 %
ESTIMATED AVERAGE GLUCOSE: 148 MG/DL
GLUCOSE QUALITATIVE U: NEGATIVE
GLUCOSE SERPL-MCNC: 130 MG/DL
HBA1C MFR BLD HPLC: 6.8 %
HCT VFR BLD CALC: 39.1 %
HDLC SERPL-MCNC: 41 MG/DL
HGB BLD-MCNC: 13.4 G/DL
IMM GRANULOCYTES NFR BLD AUTO: 0.3 %
KETONES URINE: NEGATIVE
LDLC SERPL CALC-MCNC: 61 MG/DL
LEUKOCYTE ESTERASE URINE: NEGATIVE
LYMPHOCYTES # BLD AUTO: 2.44 K/UL
LYMPHOCYTES NFR BLD AUTO: 32.5 %
MAN DIFF?: NORMAL
MCHC RBC-ENTMCNC: 32.4 PG
MCHC RBC-ENTMCNC: 34.3 GM/DL
MCV RBC AUTO: 94.4 FL
MONOCYTES # BLD AUTO: 0.58 K/UL
MONOCYTES NFR BLD AUTO: 7.7 %
NEUTROPHILS # BLD AUTO: 4.31 K/UL
NEUTROPHILS NFR BLD AUTO: 57.5 %
NITRITE URINE: NEGATIVE
NONHDLC SERPL-MCNC: 91 MG/DL
PH URINE: 6.5
PLATELET # BLD AUTO: 261 K/UL
POTASSIUM SERPL-SCNC: 4.7 MMOL/L
PROT SERPL-MCNC: 6.9 G/DL
PROTEIN URINE: NEGATIVE
PSA FREE FLD-MCNC: 54 %
PSA FREE SERPL-MCNC: 0.76 NG/ML
PSA SERPL-MCNC: 1.41 NG/ML
RBC # BLD: 4.14 M/UL
RBC # FLD: 12.6 %
SODIUM SERPL-SCNC: 144 MMOL/L
SPECIFIC GRAVITY URINE: 1.01
TRIGL SERPL-MCNC: 155 MG/DL
TSH SERPL-ACNC: 1.63 UIU/ML
UROBILINOGEN URINE: NORMAL
WBC # FLD AUTO: 7.5 K/UL

## 2022-09-15 NOTE — HISTORY OF PRESENT ILLNESS
[FreeTextEntry1] : follow up multiple issues and general review of labs meds etc [de-identified] : Right frozen shoulder persists\par No further GI bleeding\par \par Not great w/ diet

## 2022-11-30 ENCOUNTER — RX RENEWAL (OUTPATIENT)
Age: 72
End: 2022-11-30

## 2022-12-15 RX ORDER — ERGOCALCIFEROL 1.25 MG/1
1.25 MG CAPSULE, LIQUID FILLED ORAL
Qty: 12 | Refills: 3 | Status: ACTIVE | COMMUNITY
Start: 2022-02-01 | End: 1900-01-01

## 2023-01-24 NOTE — ED ADULT NURSE NOTE - DRUG PRE-SCREENING (DAST -1)
Bactrim Pregnancy And Lactation Text: This medication is Pregnancy Category D and is known to cause fetal risk. It is also excreted in breast milk. Dapsone Pregnancy And Lactation Text: This medication is Pregnancy Category C and is not considered safe during pregnancy or breast feeding. Klisyri Counseling:  I discussed with the patient the risks of Missy Solano including but not limited to erythema, scaling, itching, weeping, crusting, and pain. Erivedge Counseling- I discussed with the patient the risks of Erivedge including but not limited to nausea, vomiting, diarrhea, constipation, weight loss, changes in the sense of taste, decreased appetite, muscle spasms, and hair loss. The patient verbalized understanding of the proper use and possible adverse effects of Erivedge. All of the patient's questions and concerns were addressed. Xelcaneloz Pregnancy And Lactation Text: This medication is Pregnancy Category D and is not considered safe during pregnancy. The risk during breast feeding is also uncertain. Metronidazole Counseling:  I discussed with the patient the risks of metronidazole including but not limited to seizures, nausea/vomiting, a metallic taste in the mouth, nausea/vomiting and severe allergy. Picato Pregnancy And Lactation Text: This medication is Pregnancy Category C. It is unknown if this medication is excreted in breast milk. Cibinqo Counseling: I discussed with the patient the risks of Cibinqo therapy including but not limited to common cold, nausea, headache, cold sores, increased blood CPK levels, dizziness, UTIs, fatigue, acne, and vomitting. Live vaccines should be avoided. This medication has been linked to serious infections; higher rate of mortality; malignancy and lymphoproliferative disorders; major adverse cardiovascular events; thrombosis; thrombocytopenia and lymphopenia; lipid elevations; and retinal detachment. Topical Retinoid counseling:  Patient advised to apply a pea-sized amount only at bedtime and wait 30 minutes after washing their face before applying. If too drying, patient may add a non-comedogenic moisturizer. The patient verbalized understanding of the proper use and possible adverse effects of retinoids. All of the patient's questions and concerns were addressed. Opioid Counseling: I discussed with the patient the potential side effects of opioids including but not limited to addiction, altered mental status, and depression. I stressed avoiding alcohol, benzodiazepines, muscle relaxants and sleep aids unless specifically okayed by a physician. The patient verbalized understanding of the proper use and possible adverse effects of opioids. All of the patient's questions and concerns were addressed. They were instructed to flush the remaining pills down the toilet if they did not need them for pain. Otezla Pregnancy And Lactation Text: This medication is Pregnancy Category C and it isn't known if it is safe during pregnancy. It is unknown if it is excreted in breast milk. Skyrizi Pregnancy And Lactation Text: The risk during pregnancy and breastfeeding is uncertain with this medication. Niacinamide Counseling: I recommended taking niacin or niacinamide, also know as vitamin B3, twice daily. Recent evidence suggests that taking vitamin B3 (500 mg twice daily) can reduce the risk of actinic keratoses and non-melanoma skin cancers. Side effects of vitamin B3 include flushing and headache. Sarecycline Pregnancy And Lactation Text: This medication is Pregnancy Category D and not consider safe during pregnancy. It is also excreted in breast milk. Infliximab Counseling:  I discussed with the patient the risks of infliximab including but not limited to myelosuppression, immunosuppression, autoimmune hepatitis, demyelinating diseases, lymphoma, and serious infections. The patient understands that monitoring is required including a PPD at baseline and must alert us or the primary physician if symptoms of infection or other concerning signs are noted. Stelara Counseling:  I discussed with the patient the risks of ustekinumab including but not limited to immunosuppression, malignancy, posterior leukoencephalopathy syndrome, and serious infections. The patient understands that monitoring is required including a PPD at baseline and must alert us or the primary physician if symptoms of infection or other concerning signs are noted. High Dose Vitamin A Counseling: Side effects reviewed, pt to contact office should one occur. Cyclophosphamide Counseling:  I discussed with the patient the risks of cyclophosphamide including but not limited to hair loss, hormonal abnormalities, decreased fertility, abdominal pain, diarrhea, nausea and vomiting, bone marrow suppression and infection. The patient understands that monitoring is required while taking this medication. Carac Counseling:  I discussed with the patient the risks of Carac including but not limited to erythema, scaling, itching, weeping, crusting, and pain. Infliximab Pregnancy And Lactation Text: This medication is Pregnancy Category B and is considered safe during pregnancy. It is unknown if this medication is excreted in breast milk. Tranexamic Acid Pregnancy And Lactation Text: It is unknown if this medication is safe during pregnancy or breast feeding. Opioid Pregnancy And Lactation Text: These medications can lead to premature delivery and should be avoided during pregnancy. These medications are also present in breast milk in small amounts. Hydroxyzine Pregnancy And Lactation Text: This medication is not safe during pregnancy and should not be taken. It is also excreted in breast milk and breast feeding isn't recommended. Cimzia Pregnancy And Lactation Text: This medication crosses the placenta but can be considered safe in certain situations. Cimzia may be excreted in breast milk. Finasteride Pregnancy And Lactation Text: This medication is absolutely contraindicated during pregnancy. It is unknown if it is excreted in breast milk. Ketoconazole Counseling:   Patient counseled regarding improving absorption with orange juice. Adverse effects include but are not limited to breast enlargement, headache, diarrhea, nausea, upset stomach, liver function test abnormalities, taste disturbance, and stomach pain. There is a rare possibility of liver failure that can occur when taking ketoconazole. The patient understands that monitoring of LFTs may be required, especially at baseline. The patient verbalized understanding of the proper use and possible adverse effects of ketoconazole. All of the patient's questions and concerns were addressed. Drysol Pregnancy And Lactation Text: This medication is considered safe during pregnancy and breast feeding. Ketoconazole Pregnancy And Lactation Text: This medication is Pregnancy Category C and it isn't know if it is safe during pregnancy. It is also excreted in breast milk and breast feeding isn't recommended. Elidel Counseling: Patient may experience a mild burning sensation during topical application. Elidel is not approved in children less than 3years of age. There have been case reports of hematologic and skin malignancies in patients using topical calcineurin inhibitors although causality is questionable. Cosentyx Counseling:  I discussed with the patient the risks of Cosentyx including but not limited to worsening of Crohn's disease, immunosuppression, allergic reactions and infections. The patient understands that monitoring is required including a PPD at baseline and must alert us or the primary physician if symptoms of infection or other concerning signs are noted. Erivedge Pregnancy And Lactation Text: This medication is Pregnancy Category X and is absolutely contraindicated during pregnancy. It is unknown if it is excreted in breast milk. Cephalexin Counseling: I counseled the patient regarding use of cephalexin as an antibiotic for prophylactic and/or therapeutic purposes. Cephalexin (commonly prescribed under brand name Keflex) is a cephalosporin antibiotic which is active against numerous classes of bacteria, including most skin bacteria. Side effects may include nausea, diarrhea, gastrointestinal upset, rash, hives, yeast infections, and in rare cases, hepatitis, kidney disease, seizures, fever, confusion, neurologic symptoms, and others. Patients with severe allergies to penicillin medications are cautioned that there is about a 10% incidence of cross-reactivity with cephalosporins. When possible, patients with penicillin allergies should use alternatives to cephalosporins for antibiotic therapy. Acitretin Counseling:  I discussed with the patient the risks of acitretin including but not limited to hair loss, dry lips/skin/eyes, liver damage, hyperlipidemia, depression/suicidal ideation, photosensitivity. Serious rare side effects can include but are not limited to pancreatitis, pseudotumor cerebri, bony changes, clot formation/stroke/heart attack. Patient understands that alcohol is contraindicated since it can result in liver toxicity and significantly prolong the elimination of the drug by many years. Metronidazole Pregnancy And Lactation Text: This medication is Pregnancy Category B and considered safe during pregnancy. It is also excreted in breast milk. Klisyri Pregnancy And Lactation Text: It is unknown if this medication can harm a developing fetus or if it is excreted in breast milk. Protopic Counseling: Patient may experience a mild burning sensation during topical application. Protopic is not approved in children less than 3years of age. There have been case reports of hematologic and skin malignancies in patients using topical calcineurin inhibitors although causality is questionable. Gabapentin Counseling: I discussed with the patient the risks of gabapentin including but not limited to dizziness, somnolence, fatigue and ataxia. Tetracycline Counseling: Patient counseled regarding possible photosensitivity and increased risk for sunburn. Patient instructed to avoid sunlight, if possible. When exposed to sunlight, patients should wear protective clothing, sunglasses, and sunscreen. The patient was instructed to call the office immediately if the following severe adverse effects occur:  hearing changes, easy bruising/bleeding, severe headache, or vision changes. The patient verbalized understanding of the proper use and possible adverse effects of tetracycline. All of the patient's questions and concerns were addressed. Patient understands to avoid pregnancy while on therapy due to potential birth defects. Oxybutynin Counseling:  I discussed with the patient the risks of oxybutynin including but not limited to skin rash, drowsiness, dry mouth, difficulty urinating, and blurred vision. Cibinqo Pregnancy And Lactation Text: It is unknown if this medication will adversely affect pregnancy or breast feeding. You should not take this medication if you are currently pregnant or planning a pregnancy or while breastfeeding. Wartpeel Counseling:  I discussed with the patient the risks of Wartpeel including but not limited to erythema, scaling, itching, weeping, crusting, and pain. Niacinamide Pregnancy And Lactation Text: These medications are considered safe during pregnancy. Cyclophosphamide Pregnancy And Lactation Text: This medication is Pregnancy Category D and it isn't considered safe during pregnancy. This medication is excreted in breast milk. Tazorac Counseling:  Patient advised that medication is irritating and drying. Patient may need to apply sparingly and wash off after an hour before eventually leaving it on overnight. The patient verbalized understanding of the proper use and possible adverse effects of tazorac. All of the patient's questions and concerns were addressed. High Dose Vitamin A Pregnancy And Lactation Text: High dose vitamin A therapy is contraindicated during pregnancy and breast feeding. Wartpeel Pregnancy And Lactation Text: This medication is Pregnancy Category X and contraindicated in pregnancy and in women who may become pregnant. It is unknown if this medication is excreted in breast milk. Oxybutynin Pregnancy And Lactation Text: This medication is Pregnancy Category B and is considered safe during pregnancy. It is unknown if it is excreted in breast milk. Birth Control Pills Counseling: Birth Control Pill Counseling: I discussed with the patient the potential side effects of OCPs including but not limited to increased risk of stroke, heart attack, thrombophlebitis, deep venous thrombosis, hepatic adenomas, breast changes, GI upset, headaches, and depression. The patient verbalized understanding of the proper use and possible adverse effects of OCPs. All of the patient's questions and concerns were addressed. Valtrex Counseling: I discussed with the patient the risks of valacyclovir including but not limited to kidney damage, nausea, vomiting and severe allergy. The patient understands that if the infection seems to be worsening or is not improving, they are to call. VTAMA Counseling: I discussed with the patient that Lizbeth Tate is not for use in the eyes, mouth or mouth. They should call the office if they develop any signs of allergic reactions to [place] Tate. The patient verbalized understanding of the proper use and possible adverse effects of VTAMA. All of the patient's questions and concerns were addressed. Arava Counseling:  Patient counseled regarding adverse effects of Arava including but not limited to nausea, vomiting, abnormalities in liver function tests. Patients may develop mouth sores, rash, diarrhea, and abnormalities in blood counts. The patient understands that monitoring is required including LFTs and blood counts. There is a rare possibility of scarring of the liver and lung problems that can occur when taking methotrexate. Persistent nausea, loss of appetite, pale stools, dark urine, cough, and shortness of breath should be reported immediately. Patient advised to discontinue Arava treatment and consult with a physician prior to attempting conception. The patient will have to undergo a treatment to eliminate Arava from the body prior to conception. Cephalexin Pregnancy And Lactation Text: This medication is Pregnancy Category B and considered safe during pregnancy. It is also excreted in breast milk but can be used safely for shorter doses. Minoxidil Counseling: Minoxidil is a topical medication which can increase blood flow where it is applied. It is uncertain how this medication increases hair growth. Side effects are uncommon and include stinging and allergic reactions. Birth Control Pills Pregnancy And Lactation Text: This medication should be avoided if pregnant and for the first 30 days post-partum. Protopic Pregnancy And Lactation Text: This medication is Pregnancy Category C. It is unknown if this medication is excreted in breast milk when applied topically. Libtayo Counseling- I discussed with the patient the risks of Libtayo including but not limited to nausea, vomiting, diarrhea, and bone or muscle pain. The patient verbalized understanding of the proper use and possible adverse effects of Libtayo. All of the patient's questions and concerns were addressed. Olumiant Counseling: I discussed with the patient the risks of Olumiant therapy including but not limited to upper respiratory tract infections, shingles, cold sores, and nausea. Live vaccines should be avoided. This medication has been linked to serious infections; higher rate of mortality; malignancy and lymphoproliferative disorders; major adverse cardiovascular events; thrombosis; gastrointestinal perforations; neutropenia; lymphopenia; anemia; liver enzyme elevations; and lipid elevations. Rituxan Counseling:  I discussed with the patient the risks of Rituxan infusions. Side effects can include infusion reactions, severe drug rashes including mucocutaneous reactions, reactivation of latent hepatitis and other infections and rarely progressive multifocal leukoencephalopathy. All of the patient's questions and concerns were addressed. Nsaids Counseling: NSAID Counseling: I discussed with the patient that NSAIDs should be taken with food. Prolonged use of NSAIDs can result in the development of stomach ulcers. Patient advised to stop taking NSAIDs if abdominal pain occurs. The patient verbalized understanding of the proper use and possible adverse effects of NSAIDs. All of the patient's questions and concerns were addressed. Statement Selected Terbinafine Counseling: Patient counseling regarding adverse effects of terbinafine including but not limited to headache, diarrhea, rash, upset stomach, liver function test abnormalities, itching, taste/smell disturbance, nausea, abdominal pain, and flatulence. There is a rare possibility of liver failure that can occur when taking terbinafine. The patient understands that a baseline LFT and kidney function test may be required. The patient verbalized understanding of the proper use and possible adverse effects of terbinafine. All of the patient's questions and concerns were addressed. Detail Level: Detailed Gabapentin Pregnancy And Lactation Text: This medication is Pregnancy Category C and isn't considered safe during pregnancy. It is excreted in breast milk. Albendazole Counseling:  I discussed with the patient the risks of albendazole including but not limited to cytopenia, kidney damage, nausea/vomiting and severe allergy. The patient understands that this medication is being used in an off-label manner. Minocycline Counseling: Patient advised regarding possible photosensitivity and discoloration of the teeth, skin, lips, tongue and gums. Patient instructed to avoid sunlight, if possible. When exposed to sunlight, patients should wear protective clothing, sunglasses, and sunscreen. The patient was instructed to call the office immediately if the following severe adverse effects occur:  hearing changes, easy bruising/bleeding, severe headache, or vision changes. The patient verbalized understanding of the proper use and possible adverse effects of minocycline. All of the patient's questions and concerns were addressed. Albendazole Pregnancy And Lactation Text: This medication is Pregnancy Category C and it isn't known if it is safe during pregnancy. It is also excreted in breast milk. Rituxan Pregnancy And Lactation Text: This medication is Pregnancy Category C and it isn't know if it is safe during pregnancy. It is unknown if this medication is excreted in breast milk but similar antibodies are known to be excreted. Qbrexza Counseling:  I discussed with the patient the risks of Cj Nishanthillary including but not limited to headache, mydriasis, blurred vision, dry eyes, nasal dryness, dry mouth, dry throat, dry skin, urinary hesitation, and constipation. Local skin reactions including erythema, burning, stinging, and itching can also occur. Libtayo Pregnancy And Lactation Text: This medication is contraindicated in pregnancy and when breast feeding. Olumiant Pregnancy And Lactation Text: Based on animal studies, Mardell Chancy may cause embryo-fetal harm when administered to pregnant women. The medication should not be used in pregnancy. Breastfeeding is not recommended during treatment. Nsaids Pregnancy And Lactation Text: These medications are considered safe up to 30 weeks gestation. It is excreted in breast milk. Tazorac Pregnancy And Lactation Text: This medication is not safe during pregnancy. It is unknown if this medication is excreted in breast milk. Winlevi Counseling:  I discussed with the patient the risks of topical clascoterone including but not limited to erythema, scaling, itching, and stinging. Patient voiced their understanding. Valtrex Pregnancy And Lactation Text: this medication is Pregnancy Category B and is considered safe during pregnancy. This medication is not directly found in breast milk but it's metabolite acyclovir is present. Propranolol Counseling:  I discussed with the patient the risks of propranolol including but not limited to low heart rate, low blood pressure, low blood sugar, restlessness and increased cold sensitivity. They should call the office if they experience any of these side effects. Taltz Counseling: I discussed with the patient the risks of ixekizumab including but not limited to immunosuppression, serious infections, worsening of inflammatory bowel disease and drug reactions. The patient understands that monitoring is required including a PPD at baseline and must alert us or the primary physician if symptoms of infection or other concerning signs are noted. Cyclosporine Counseling:  I discussed with the patient the risks of cyclosporine including but not limited to hypertension, gingival hyperplasia,myelosuppression, immunosuppression, liver damage, kidney damage, neurotoxicity, lymphoma, and serious infections. The patient understands that monitoring is required including baseline blood pressure, CBC, CMP, lipid panel and uric acid, and then 1-2 times monthly CMP and blood pressure. Calcipotriene Counseling:  I discussed with the patient the risks of calcipotriene including but not limited to erythema, scaling, itching, and irritation. Vtama Pregnancy And Lactation Text: It is unknown if this medication can cause problems during pregnancy and breastfeeding. Calcipotriene Pregnancy And Lactation Text: This medication has not been proven safe during pregnancy. It is unknown if this medication is excreted in breast milk. Winlevi Pregnancy And Lactation Text: This medication is considered safe during pregnancy and breastfeeding. Include Pregnancy/Lactation Warning?: No Cyclosporine Pregnancy And Lactation Text: This medication is Pregnancy Category C and it isn't know if it is safe during pregnancy. This medication is excreted in breast milk. Eucrisa Counseling: Patient may experience a mild burning sensation during topical application. Tom Holbrook is not approved in children less than 3years of age. Dupixent Counseling: I discussed with the patient the risks of dupilumab including but not limited to eye infection and irritation, cold sores, injection site reactions, worsening of asthma, allergic reactions and increased risk of parasitic infection. Live vaccines should be avoided while taking dupilumab. Dupilumab will also interact with certain medications such as warfarin and cyclosporine. The patient understands that monitoring is required and they must alert us or the primary physician if symptoms of infection or other concerning signs are noted. Minoxidil Pregnancy And Lactation Text: This medication has not been assigned a Pregnancy Risk Category but animal studies failed to show danger with the topical medication. It is unknown if the medication is excreted in breast milk. Fluconazole Counseling:  Patient counseled regarding adverse effects of fluconazole including but not limited to headache, diarrhea, nausea, upset stomach, liver function test abnormalities, taste disturbance, and stomach pain. There is a rare possibility of liver failure that can occur when taking fluconazole. The patient understands that monitoring of LFTs and kidney function test may be required, especially at baseline. The patient verbalized understanding of the proper use and possible adverse effects of fluconazole. All of the patient's questions and concerns were addressed. Enbrel Counseling:  I discussed with the patient the risks of etanercept including but not limited to myelosuppression, immunosuppression, autoimmune hepatitis, demyelinating diseases, lymphoma, and infections. The patient understands that monitoring is required including a PPD at baseline and must alert us or the primary physician if symptoms of infection or other concerning signs are noted. Glycopyrrolate Counseling:  I discussed with the patient the risks of glycopyrrolate including but not limited to skin rash, drowsiness, dry mouth, difficulty urinating, and blurred vision. Terbinafine Pregnancy And Lactation Text: This medication is Pregnancy Category B and is considered safe during pregnancy. It is also excreted in breast milk and breast feeding isn't recommended. Spironolactone Counseling: Patient advised regarding risks of diarrhea, abdominal pain, hyperkalemia, birth defects (for female patients), liver toxicity and renal toxicity. The patient may need blood work to monitor liver and kidney function and potassium levels while on therapy. The patient verbalized understanding of the proper use and possible adverse effects of spironolactone. All of the patient's questions and concerns were addressed. Clindamycin Counseling: I counseled the patient regarding use of clindamycin as an antibiotic for prophylactic and/or therapeutic purposes. Clindamycin is active against numerous classes of bacteria, including skin bacteria. Side effects may include nausea, diarrhea, gastrointestinal upset, rash, hives, yeast infections, and in rare cases, colitis. Ivermectin Counseling:  Patient instructed to take medication on an empty stomach with a full glass of water. Patient informed of potential adverse effects including but not limited to nausea, diarrhea, dizziness, itching, and swelling of the extremities or lymph nodes. The patient verbalized understanding of the proper use and possible adverse effects of ivermectin. All of the patient's questions and concerns were addressed. Glycopyrrolate Pregnancy And Lactation Text: This medication is Pregnancy Category B and is considered safe during pregnancy. It is unknown if it is excreted breast milk. Clindamycin Pregnancy And Lactation Text: This medication can be used in pregnancy if certain situations. Clindamycin is also present in breast milk. Odomzo Counseling- I discussed with the patient the risks of Odomzo including but not limited to nausea, vomiting, diarrhea, constipation, weight loss, changes in the sense of taste, decreased appetite, muscle spasms, and hair loss. The patient verbalized understanding of the proper use and possible adverse effects of Odomzo. All of the patient's questions and concerns were addressed. Mirvaso Counseling: Cruzito Ocean is a topical medication which can decrease superficial blood flow where applied. Side effects are uncommon and include stinging, redness and allergic reactions. Quinolones Counseling:  I discussed with the patient the risks of fluoroquinolones including but not limited to GI upset, allergic reaction, drug rash, diarrhea, dizziness, photosensitivity, yeast infections, liver function test abnormalities, tendonitis/tendon rupture. Qbrexza Pregnancy And Lactation Text: There is no available data on Qbrexza use in pregnant women. There is no available data on Qbrexza use in lactation. Rinvoq Counseling: I discussed with the patient the risks of Rinvoq therapy including but not limited to upper respiratory tract infections, shingles, cold sores, bronchitis, nausea, cough, fever, acne, and headache. Live vaccines should be avoided. This medication has been linked to serious infections; higher rate of mortality; malignancy and lymphoproliferative disorders; major adverse cardiovascular events; thrombosis; thrombocytopenia, anemia, and neutropenia; lipid elevations; liver enzyme elevations; and gastrointestinal perforations. Topical Clindamycin Counseling: Patient counseled that this medication may cause skin irritation or allergic reactions. In the event of skin irritation, the patient was advised to reduce the amount of the drug applied or use it less frequently. The patient verbalized understanding of the proper use and possible adverse effects of clindamycin. All of the patient's questions and concerns were addressed. Propranolol Pregnancy And Lactation Text: This medication is Pregnancy Category C and it isn't known if it is safe during pregnancy. It is excreted in breast milk. Olanzapine Counseling- I discussed with the patient the common side effects of olanzapine including but are not limited to: lack of energy, dry mouth, increased appetite, sleepiness, tremor, constipation, dizziness, changes in behavior, or restlessness. Explained that teenagers are more likely to experience headaches, abdominal pain, pain in the arms or legs, tiredness, and sleepiness. Serious side effects include but are not limited: increased risk of death in elderly patients who are confused, have memory loss, or dementia-related psychosis; hyperglycemia; increased cholesterol and triglycerides; and weight gain. Siliq Counseling:  I discussed with the patient the risks of Siliq including but not limited to new or worsening depression, suicidal thoughts and behavior, immunosuppression, malignancy, posterior leukoencephalopathy syndrome, and serious infections. The patient understands that monitoring is required including a PPD at baseline and must alert us or the primary physician if symptoms of infection or other concerning signs are noted. There is also a special program designed to monitor depression which is required with Siliq. Aklief counseling:  Patient advised to apply a pea-sized amount only at bedtime and wait 30 minutes after washing their face before applying. If too drying, patient may add a non-comedogenic moisturizer. The most commonly reported side effects including irritation, redness, scaling, dryness, stinging, burning, itching, and increased risk of sunburn. The patient verbalized understanding of the proper use and possible adverse effects of retinoids. All of the patient's questions and concerns were addressed. Tremfya Counseling: I discussed with the patient the risks of guselkumab including but not limited to immunosuppression, serious infections, worsening of inflammatory bowel disease and drug reactions. The patient understands that monitoring is required including a PPD at baseline and must alert us or the primary physician if symptoms of infection or other concerning signs are noted. Aklief Pregnancy And Lactation Text: It is unknown if this medication is safe to use during pregnancy. It is unknown if this medication is excreted in breast milk. Breastfeeding women should use the topical cream on the smallest area of the skin for the shortest time needed while breastfeeding. Do not apply to nipple and areola. Cimetidine Counseling:  I discussed with the patient the risks of Cimetidine including but not limited to gynecomastia, headache, diarrhea, nausea, drowsiness, arrhythmias, pancreatitis, skin rashes, psychosis, bone marrow suppression and kidney toxicity. Methotrexate Counseling:  Patient counseled regarding adverse effects of methotrexate including but not limited to nausea, vomiting, abnormalities in liver function tests. Patients may develop mouth sores, rash, diarrhea, and abnormalities in blood counts. The patient understands that monitoring is required including LFT's and blood counts. There is a rare possibility of scarring of the liver and lung problems that can occur when taking methotrexate. Persistent nausea, loss of appetite, pale stools, dark urine, cough, and shortness of breath should be reported immediately. Patient advised to discontinue methotrexate treatment at least three months before attempting to become pregnant. I discussed the need for folate supplements while taking methotrexate. These supplements can decrease side effects during methotrexate treatment. The patient verbalized understanding of the proper use and possible adverse effects of methotrexate. All of the patient's questions and concerns were addressed. Acitretin Pregnancy And Lactation Text: This medication is Pregnancy Category X and should not be given to women who are pregnant or may become pregnant in the future. This medication is excreted in breast milk. Dupixent Pregnancy And Lactation Text: This medication likely crosses the placenta but the risk for the fetus is uncertain. This medication is excreted in breast milk. Spironolactone Pregnancy And Lactation Text: This medication can cause feminization of the male fetus and should be avoided during pregnancy. The active metabolite is also found in breast milk. Clofazimine Counseling:  I discussed with the patient the risks of clofazimine including but not limited to skin and eye pigmentation, liver damage, nausea/vomiting, gastrointestinal bleeding and allergy. Fluconazole Pregnancy And Lactation Text: This medication is Pregnancy Category C and it isn't know if it is safe during pregnancy. It is also excreted in breast milk. Zoryve Counseling:  I discussed with the patient that Almer Chiquito is not for use in the eyes, mouth or vagina. The most commonly reported side effects include diarrhea, headache, insomnia, application site pain, upper respiratory tract infections, and urinary tract infections. All of the patient's questions and concerns were addressed. Hydroquinone Counseling:  Patient advised that medication may result in skin irritation, lightening (hypopigmentation), dryness, and burning. In the event of skin irritation, the patient was advised to reduce the amount of the drug applied or use it less frequently. Rarely, spots that are treated with hydroquinone can become darker (pseudoochronosis). Should this occur, patient instructed to stop medication and call the office. The patient verbalized understanding of the proper use and possible adverse effects of hydroquinone. All of the patient's questions and concerns were addressed. Doxycycline Counseling:  Patient counseled regarding possible photosensitivity and increased risk for sunburn. Patient instructed to avoid sunlight, if possible. When exposed to sunlight, patients should wear protective clothing, sunglasses, and sunscreen. The patient was instructed to call the office immediately if the following severe adverse effects occur:  hearing changes, easy bruising/bleeding, severe headache, or vision changes. The patient verbalized understanding of the proper use and possible adverse effects of doxycycline. All of the patient's questions and concerns were addressed. Mirvaso Pregnancy And Lactation Text: This medication has not been assigned a Pregnancy Risk Category. It is unknown if the medication is excreted in breast milk. Rhofade Counseling: Rhofade is a topical medication which can decrease superficial blood flow where applied. Side effects are uncommon and include stinging, redness and allergic reactions. SSKI Counseling:  I discussed with the patient the risks of SSKI including but not limited to thyroid abnormalities, metallic taste, GI upset, fever, headache, acne, arthralgias, paraesthesias, lymphadenopathy, easy bleeding, arrhythmias, and allergic reaction. Rinvoq Pregnancy And Lactation Text: Based on animal studies, Rinvoq may cause embryo-fetal harm when administered to pregnant women. The medication should not be used in pregnancy. Breastfeeding is not recommended during treatment and for 6 days after the last dose. Olanzapine Pregnancy And Lactation Text: This medication is pregnancy category C. There are no adequate and well controlled trials with olanzapine in pregnant females. Olanzapine should be used during pregnancy only if the potential benefit justifies the potential risk to the fetus. In a study in lactating healthy women, olanzapine was excreted in breast milk. It is recommended that women taking olanzapine should not breast feed. Hydroxychloroquine Counseling:  I discussed with the patient that a baseline ophthalmologic exam is needed at the start of therapy and every year thereafter while on therapy. A CBC may also be warranted for monitoring. The side effects of this medication were discussed with the patient, including but not limited to agranulocytosis, aplastic anemia, seizures, rashes, retinopathy, and liver toxicity. Patient instructed to call the office should any adverse effect occur. The patient verbalized understanding of the proper use and possible adverse effects of Plaquenil. All the patient's questions and concerns were addressed. Humira Counseling:  I discussed with the patient the risks of adalimumab including but not limited to myelosuppression, immunosuppression, autoimmune hepatitis, demyelinating diseases, lymphoma, and serious infections. The patient understands that monitoring is required including a PPD at baseline and must alert us or the primary physician if symptoms of infection or other concerning signs are noted. Methotrexate Pregnancy And Lactation Text: This medication is Pregnancy Category X and is known to cause fetal harm. This medication is excreted in breast milk. Azathioprine Counseling:  I discussed with the patient the risks of azathioprine including but not limited to myelosuppression, immunosuppression, hepatotoxicity, lymphoma, and infections. The patient understands that monitoring is required including baseline LFTs, Creatinine, possible TPMP genotyping and weekly CBCs for the first month and then every 2 weeks thereafter. The patient verbalized understanding of the proper use and possible adverse effects of azathioprine. All of the patient's questions and concerns were addressed. Azelaic Acid Counseling: Patient counseled that medicine may cause skin irritation and to avoid applying near the eyes. In the event of skin irritation, the patient was advised to reduce the amount of the drug applied or use it less frequently. The patient verbalized understanding of the proper use and possible adverse effects of azelaic acid. All of the patient's questions and concerns were addressed. Sski Pregnancy And Lactation Text: This medication is Pregnancy Category D and isn't considered safe during pregnancy. It is excreted in breast milk. Bexarotene Counseling:  I discussed with the patient the risks of bexarotene including but not limited to hair loss, dry lips/skin/eyes, liver abnormalities, hyperlipidemia, pancreatitis, depression/suicidal ideation, photosensitivity, drug rash/allergic reactions, hypothyroidism, anemia, leukopenia, infection, cataracts, and teratogenicity. Patient understands that they will need regular blood tests to check lipid profile, liver function tests, white blood cell count, thyroid function tests and pregnancy test if applicable. Griseofulvin Counseling:  I discussed with the patient the risks of griseofulvin including but not limited to photosensitivity, cytopenia, liver damage, nausea/vomiting and severe allergy. The patient understands that this medication is best absorbed when taken with a fatty meal (e.g., ice cream or french fries). Cantharidin Pregnancy And Lactation Text: The use of this medication during pregnancy or lactation is not recommended as there is insufficient data. Griseofulvin Pregnancy And Lactation Text: This medication is Pregnancy Category X and is known to cause serious birth defects. It is unknown if this medication is excreted in breast milk but breast feeding should be avoided. Zyclara Counseling:  I discussed with the patient the risks of imiquimod including but not limited to erythema, scaling, itching, weeping, crusting, and pain. Patient understands that the inflammatory response to imiquimod is variable from person to person and was educated regarded proper titration schedule. If flu-like symptoms develop, patient knows to discontinue the medication and contact us. Colchicine Counseling:  Patient counseled regarding adverse effects including but not limited to stomach upset (nausea, vomiting, stomach pain, or diarrhea). Patient instructed to limit alcohol consumption while taking this medication. Colchicine may reduce blood counts especially with prolonged use. The patient understands that monitoring of kidney function and blood counts may be required, especially at baseline. The patient verbalized understanding of the proper use and possible adverse effects of colchicine. All of the patient's questions and concerns were addressed. 5-Fu Counseling: 5-Fluorouracil Counseling:  I discussed with the patient the risks of 5-fluorouracil including but not limited to erythema, scaling, itching, weeping, crusting, and pain. Azithromycin Counseling:  I discussed with the patient the risks of azithromycin including but not limited to GI upset, allergic reaction, drug rash, diarrhea, and yeast infections. Adbry Counseling: I discussed with the patient the risks of tralokinumab including but not limited to eye infection and irritation, cold sores, injection site reactions, worsening of asthma, allergic reactions and increased risk of parasitic infection. Live vaccines should be avoided while taking tralokinumab. The patient understands that monitoring is required and they must alert us or the primary physician if symptoms of infection or other concerning signs are noted. Opzelura Counseling:  I discussed with the patient the risks of Taj Ache including but not limited to nasopharngitis, bronchitis, ear infection, eosinophila, hives, diarrhea, folliculitis, tonsillitis, and rhinorrhea. Taken orally, this medication has been linked to serious infections; higher rate of mortality; malignancy and lymphoproliferative disorders; major adverse cardiovascular events; thrombosis; thrombocytopenia, anemia, and neutropenia; and lipid elevations. Rifampin Counseling: I discussed with the patient the risks of rifampin including but not limited to liver damage, kidney damage, red-orange body fluids, nausea/vomiting and severe allergy. Sotyktu Counseling:  I discussed the most common side effects of Sotyktu including: common cold, sore throat, sinus infections, cold sores, canker sores, folliculitis, and acne. Â  I also discussed more serious side effects of Sotyktu including but not limited to: serious allergic reactions; increased risk for infections such as TB; cancers such as lymphomas; rhabdomyolysis and elevated CPK; and elevated triglycerides and liver enzymes. Â  Simponi Counseling:  I discussed with the patient the risks of golimumab including but not limited to myelosuppression, immunosuppression, autoimmune hepatitis, demyelinating diseases, lymphoma, and serious infections. The patient understands that monitoring is required including a PPD at baseline and must alert us or the primary physician if symptoms of infection or other concerning signs are noted. Oral Minoxidil Counseling- I discussed with the patient the risks of oral minoxidil including but not limited to shortness of breath, swelling of the feet or ankles, dizziness, lightheadedness, unwanted hair growth and allergic reaction. The patient verbalized understanding of the proper use and possible adverse effects of oral minoxidil. All of the patient's questions and concerns were addressed. Hydroxychloroquine Pregnancy And Lactation Text: This medication has been shown to cause fetal harm but it isn't assigned a Pregnancy Risk Category. There are small amounts excreted in breast milk. Topical Ketoconazole Counseling: Patient counseled that this medication may cause skin irritation or allergic reactions. In the event of skin irritation, the patient was advised to reduce the amount of the drug applied or use it less frequently. The patient verbalized understanding of the proper use and possible adverse effects of ketoconazole. All of the patient's questions and concerns were addressed. Doxycycline Pregnancy And Lactation Text: This medication is Pregnancy Category D and not consider safe during pregnancy. It is also excreted in breast milk but is considered safe for shorter treatment courses. Rifampin Pregnancy And Lactation Text: This medication is Pregnancy Category C and it isn't know if it is safe during pregnancy. It is also excreted in breast milk and should not be used if you are breast feeding. Solaraze Counseling:  I discussed with the patient the risks of Solaraze including but not limited to erythema, scaling, itching, weeping, crusting, and pain. Azathioprine Pregnancy And Lactation Text: This medication is Pregnancy Category D and isn't considered safe during pregnancy. It is unknown if this medication is excreted in breast milk. Sotyktu Pregnancy And Lactation Text: There is insufficient data to evaluate whether or not Sotyktu is safe to use during pregnancy. Â  Â It is not known if Sotyktu passes into breast milk and whether or not it is safe to use when breastfeeding. Â Â  Oral Minoxidil Pregnancy And Lactation Text: This medication should only be used when clearly needed if you are pregnant, attempting to become pregnant or breast feeding. Doxepin Counseling:  Patient advised that the medication is sedating and not to drive a car after taking this medication. Patient informed of potential adverse effects including but not limited to dry mouth, urinary retention, and blurry vision. The patient verbalized understanding of the proper use and possible adverse effects of doxepin. All of the patient's questions and concerns were addressed. Dutasteride Counseling: Dustasteride Counseling:  I discussed with the patient the risks of use of dutasteride including but not limited to decreased libido, decreased ejaculate volume, and gynecomastia. Women who can become pregnant should not handle medication. All of the patient's questions and concerns were addressed. Bexarotene Pregnancy And Lactation Text: This medication is Pregnancy Category X and should not be given to women who are pregnant or may become pregnant. This medication should not be used if you are breast feeding. Thalidomide Counseling: I discussed with the patient the risks of thalidomide including but not limited to birth defects, anxiety, weakness, chest pain, dizziness, cough and severe allergy. Xolair Counseling:  Patient informed of potential adverse effects including but not limited to fever, muscle aches, rash and allergic reactions. The patient verbalized understanding of the proper use and possible adverse effects of Xolair. All of the patient's questions and concerns were addressed. Prednisone Counseling:  I discussed with the patient the risks of prolonged use of prednisone including but not limited to weight gain, insomnia, osteoporosis, mood changes, diabetes, susceptibility to infection, glaucoma and high blood pressure. In cases where prednisone use is prolonged, patients should be monitored with blood pressure checks, serum glucose levels and an eye exam.  Additionally, the patient may need to be placed on GI prophylaxis, PCP prophylaxis, and calcium and vitamin D supplementation and/or a bisphosphonate. The patient verbalized understanding of the proper use and the possible adverse effects of prednisone. All of the patient's questions and concerns were addressed. Dutasteride Pregnancy And Lactation Text: This medication is absolutely contraindicated in women, especially during pregnancy and breast feeding. Feminization of male fetuses is possible if taking while pregnant. Doxepin Pregnancy And Lactation Text: This medication is Pregnancy Category C and it isn't known if it is safe during pregnancy. It is also excreted in breast milk and breast feeding isn't recommended. Azithromycin Pregnancy And Lactation Text: This medication is considered safe during pregnancy and is also secreted in breast milk. Imiquimod Counseling:  I discussed with the patient the risks of imiquimod including but not limited to erythema, scaling, itching, weeping, crusting, and pain. Patient understands that the inflammatory response to imiquimod is variable from person to person and was educated regarded proper titration schedule. If flu-like symptoms develop, patient knows to discontinue the medication and contact us. Adbry Pregnancy And Lactation Text: It is unknown if this medication will adversely affect pregnancy or breast feeding. Opzelura Pregnancy And Lactation Text: There is insufficient data to evaluate drug-associated risk for major birth defects, miscarriage, or other adverse maternal or fetal outcomes. There is a pregnancy registry that monitors pregnancy outcomes in pregnant persons exposed to the medication during pregnancy. It is unknown if this medication is excreted in breast milk. Do not breastfeed during treatment and for about 4 weeks after the last dose. Itraconazole Counseling:  I discussed with the patient the risks of itraconazole including but not limited to liver damage, nausea/vomiting, neuropathy, and severe allergy. The patient understands that this medication is best absorbed when taken with acidic beverages such as non-diet cola or ginger ale. The patient understands that monitoring is required including baseline LFTs and repeat LFTs at intervals. The patient understands that they are to contact us or the primary physician if concerning signs are noted. Ilumya Counseling: I discussed with the patient the risks of tildrakizumab including but not limited to immunosuppression, malignancy, posterior leukoencephalopathy syndrome, and serious infections. The patient understands that monitoring is required including a PPD at baseline and must alert us or the primary physician if symptoms of infection or other concerning signs are noted. Low Dose Naltrexone Counseling- I discussed with the patient the potential risks and side effects of low dose naltrexone including but not limited to: more vivid dreams, headaches, nausea, vomiting, abdominal pain, fatigue, dizziness, and anxiety. Erythromycin Counseling:  I discussed with the patient the risks of erythromycin including but not limited to GI upset, allergic reaction, drug rash, diarrhea, increase in liver enzymes, and yeast infections. Low Dose Naltrexone Pregnancy And Lactation Text: Naltrexone is pregnancy category C. There have been no adequate and well-controlled studies in pregnant women. It should be used in pregnancy only if the potential benefit justifies the potential risk to the fetus. Limited data indicates that naltrexone is minimally excreted into breastmilk. Erythromycin Pregnancy And Lactation Text: This medication is Pregnancy Category B and is considered safe during pregnancy. It is also excreted in breast milk. Picato Counseling:  I discussed with the patient the risks of Picato including but not limited to erythema, scaling, itching, weeping, crusting, and pain. Sarecycline Counseling: Patient advised regarding possible photosensitivity and discoloration of the teeth, skin, lips, tongue and gums. Patient instructed to avoid sunlight, if possible. When exposed to sunlight, patients should wear protective clothing, sunglasses, and sunscreen. The patient was instructed to call the office immediately if the following severe adverse effects occur:  hearing changes, easy bruising/bleeding, severe headache, or vision changes. The patient verbalized understanding of the proper use and possible adverse effects of sarecycline. All of the patient's questions and concerns were addressed. Solaraze Pregnancy And Lactation Text: This medication is Pregnancy Category B and is considered safe. There is some data to suggest avoiding during the third trimester. It is unknown if this medication is excreted in breast milk. Topical Sulfur Applications Counseling: Topical Sulfur Counseling: Patient counseled that this medication may cause skin irritation or allergic reactions. In the event of skin irritation, the patient was advised to reduce the amount of the drug applied or use it less frequently. The patient verbalized understanding of the proper use and possible adverse effects of topical sulfur application. All of the patient's questions and concerns were addressed. Isotretinoin Counseling: Patient should get monthly blood tests, not donate blood, not drive at night if vision affected, not share medication, and not undergo elective surgery for 6 months after tx completed. Side effects reviewed, pt to contact office should one occur. Xolair Pregnancy And Lactation Text: This medication is Pregnancy Category B and is considered safe during pregnancy. This medication is excreted in breast milk. Xeljanz Counseling: Walt Abide Counseling: I discussed with the patient the risks of Lauree Abide therapy including increased risk of infection, liver issues, headache, diarrhea, or cold symptoms. Live vaccines should be avoided. They were instructed to call if they have any problems. Otezla Counseling: Tapan Nocatee Counseling: The side effects of Tapan Nocatee were discussed with the patient, including but not limited to worsening or new depression, weight loss, diarrhea, nausea, upper respiratory tract infection, and headache. Patient instructed to call the office should any adverse effect occur. The patient verbalized understanding of the proper use and possible adverse effects of Otezla. All the patient's questions and concerns were addressed. Skyrizi Counseling: I discussed with the patient the risks of risankizumab-rzaa including but not limited to immunosuppression, and serious infections. The patient understands that monitoring is required including a PPD at baseline and must alert us or the primary physician if symptoms of infection or other concerning signs are noted. Cellcept Counseling:  I discussed with the patient the risks of mycophenolate mofetil including but not limited to infection/immunosuppression, GI upset, hypokalemia, hypercholesterolemia, bone marrow suppression, lymphoproliferative disorders, malignancy, GI ulceration/bleed/perforation, colitis, interstitial lung disease, kidney failure, progressive multifocal leukoencephalopathy, and birth defects. The patient understands that monitoring is required including a baseline creatinine and regular CBC testing. In addition, patient must alert us immediately if symptoms of infection or other concerning signs are noted. Benzoyl Peroxide Counseling: Patient counseled that medicine may cause skin irritation and bleach clothing. In the event of skin irritation, the patient was advised to reduce the amount of the drug applied or use it less frequently. The patient verbalized understanding of the proper use and possible adverse effects of benzoyl peroxide. All of the patient's questions and concerns were addressed. Benzoyl Peroxide Pregnancy And Lactation Text: This medication is Pregnancy Category C. It is unknown if benzoyl peroxide is excreted in breast milk. Hydroxyzine Counseling: Patient advised that the medication is sedating and not to drive a car after taking this medication. Patient informed of potential adverse effects including but not limited to dry mouth, urinary retention, and blurry vision. The patient verbalized understanding of the proper use and possible adverse effects of hydroxyzine. All of the patient's questions and concerns were addressed. Topical Sulfur Applications Pregnancy And Lactation Text: This medication is Pregnancy Category C and has an unknown safety profile during pregnancy. It is unknown if this topical medication is excreted in breast milk. Drysol Counseling:  I discussed with the patient the risks of drysol/aluminum chloride including but not limited to skin rash, itching, irritation, burning. Isotretinoin Pregnancy And Lactation Text: This medication is Pregnancy Category X and is considered extremely dangerous during pregnancy. It is unknown if it is excreted in breast milk. Tranexamic Acid Counseling:  Patient advised of the small risk of bleeding problems with tranexamic acid. They were also instructed to call if they developed any nausea, vomiting or diarrhea. All of the patient's questions and concerns were addressed. Cimzia Counseling:  I discussed with the patient the risks of Cimzia including but not limited to immunosuppression, allergic reactions and infections. The patient understands that monitoring is required including a PPD at baseline and must alert us or the primary physician if symptoms of infection or other concerning signs are noted. Dapsone Counseling: I discussed with the patient the risks of dapsone including but not limited to hemolytic anemia, agranulocytosis, rashes, methemoglobinemia, kidney failure, peripheral neuropathy, headaches, GI upset, and liver toxicity. Patients who start dapsone require monitoring including baseline LFTs and weekly CBCs for the first month, then every month thereafter. The patient verbalized understanding of the proper use and possible adverse effects of dapsone. All of the patient's questions and concerns were addressed. Finasteride Counseling:  I discussed with the patient the risks of use of finasteride including but not limited to decreased libido, decreased ejaculate volume, gynecomastia, and depression. Women should not handle medication. All of the patient's questions and concerns were addressed. Bactrim Counseling:  I discussed with the patient the risks of sulfa antibiotics including but not limited to GI upset, allergic reaction, drug rash, diarrhea, dizziness, photosensitivity, and yeast infections. Rarely, more serious reactions can occur including but not limited to aplastic anemia, agranulocytosis, methemoglobinemia, blood dyscrasias, liver or kidney failure, lung infiltrates or desquamative/blistering drug rashes.

## 2023-03-03 ENCOUNTER — APPOINTMENT (OUTPATIENT)
Dept: INTERNAL MEDICINE | Facility: CLINIC | Age: 73
End: 2023-03-03
Payer: MEDICARE

## 2023-03-03 ENCOUNTER — NON-APPOINTMENT (OUTPATIENT)
Age: 73
End: 2023-03-03

## 2023-03-03 VITALS
HEART RATE: 74 BPM | BODY MASS INDEX: 29.4 KG/M2 | TEMPERATURE: 97.5 F | WEIGHT: 194 LBS | OXYGEN SATURATION: 99 % | SYSTOLIC BLOOD PRESSURE: 130 MMHG | RESPIRATION RATE: 16 BRPM | HEIGHT: 68 IN | DIASTOLIC BLOOD PRESSURE: 70 MMHG

## 2023-03-03 DIAGNOSIS — Z00.00 ENCOUNTER FOR GENERAL ADULT MEDICAL EXAMINATION W/OUT ABNORMAL FINDINGS: ICD-10-CM

## 2023-03-03 LAB
25(OH)D3 SERPL-MCNC: 79.7 NG/ML
ALBUMIN SERPL ELPH-MCNC: 4.5 G/DL
ALP BLD-CCNC: 60 U/L
ALT SERPL-CCNC: 15 U/L
ANION GAP SERPL CALC-SCNC: 17 MMOL/L
APPEARANCE: CLEAR
AST SERPL-CCNC: 20 U/L
BACTERIA: NEGATIVE
BASOPHILS # BLD AUTO: 0.04 K/UL
BASOPHILS NFR BLD AUTO: 0.5 %
BILIRUB SERPL-MCNC: 0.7 MG/DL
BILIRUBIN URINE: NEGATIVE
BLOOD URINE: NEGATIVE
BUN SERPL-MCNC: 12 MG/DL
CALCIUM SERPL-MCNC: 9.6 MG/DL
CHLORIDE SERPL-SCNC: 109 MMOL/L
CHOLEST SERPL-MCNC: 134 MG/DL
CO2 SERPL-SCNC: 21 MMOL/L
COLOR: NORMAL
CREAT SERPL-MCNC: 1.15 MG/DL
EGFR: 68 ML/MIN/1.73M2
EOSINOPHIL # BLD AUTO: 0.1 K/UL
EOSINOPHIL NFR BLD AUTO: 1.2 %
ESTIMATED AVERAGE GLUCOSE: 157 MG/DL
GLUCOSE QUALITATIVE U: NEGATIVE
GLUCOSE SERPL-MCNC: 135 MG/DL
HBA1C MFR BLD HPLC: 7.1 %
HCT VFR BLD CALC: 40.3 %
HDLC SERPL-MCNC: 35 MG/DL
HGB BLD-MCNC: 13.3 G/DL
HYALINE CASTS: 0 /LPF
IMM GRANULOCYTES NFR BLD AUTO: 0.2 %
KETONES URINE: NEGATIVE
LDLC SERPL CALC-MCNC: 74 MG/DL
LEUKOCYTE ESTERASE URINE: NEGATIVE
LYMPHOCYTES # BLD AUTO: 3.07 K/UL
LYMPHOCYTES NFR BLD AUTO: 35.4 %
MAN DIFF?: NORMAL
MCHC RBC-ENTMCNC: 32 PG
MCHC RBC-ENTMCNC: 33 GM/DL
MCV RBC AUTO: 97.1 FL
MICROSCOPIC-UA: NORMAL
MONOCYTES # BLD AUTO: 0.65 K/UL
MONOCYTES NFR BLD AUTO: 7.5 %
NEUTROPHILS # BLD AUTO: 4.79 K/UL
NEUTROPHILS NFR BLD AUTO: 55.2 %
NITRITE URINE: NEGATIVE
NONHDLC SERPL-MCNC: 99 MG/DL
PH URINE: 7.5
PLATELET # BLD AUTO: 267 K/UL
POTASSIUM SERPL-SCNC: 4.8 MMOL/L
PROT SERPL-MCNC: 7 G/DL
PROTEIN URINE: NEGATIVE
PSA FREE FLD-MCNC: 63 %
PSA FREE SERPL-MCNC: 0.81 NG/ML
PSA SERPL-MCNC: 1.3 NG/ML
RBC # BLD: 4.15 M/UL
RBC # FLD: 12.9 %
RED BLOOD CELLS URINE: 5 /HPF
SODIUM SERPL-SCNC: 147 MMOL/L
SPECIFIC GRAVITY URINE: 1.01
SQUAMOUS EPITHELIAL CELLS: 2 /HPF
TRIGL SERPL-MCNC: 127 MG/DL
TSH SERPL-ACNC: 0.13 UIU/ML
UROBILINOGEN URINE: NORMAL
WBC # FLD AUTO: 8.67 K/UL
WHITE BLOOD CELLS URINE: 3 /HPF

## 2023-03-03 PROCEDURE — 93000 ELECTROCARDIOGRAM COMPLETE: CPT | Mod: 59

## 2023-03-03 PROCEDURE — G0439: CPT

## 2023-03-03 RX ORDER — LEVOTHYROXINE SODIUM 75 UG/1
75 TABLET ORAL
Qty: 90 | Refills: 3 | Status: ACTIVE | COMMUNITY
Start: 2022-02-01 | End: 1900-01-01

## 2023-03-03 NOTE — PHYSICAL EXAM
ambulated to bathroom [No Acute Distress] : no acute distress [Well Nourished] : well nourished [Well Developed] : well developed [Well-Appearing] : well-appearing [Normal Sclera/Conjunctiva] : normal sclera/conjunctiva [PERRL] : pupils equal round and reactive to light [EOMI] : extraocular movements intact [Normal Outer Ear/Nose] : the outer ears and nose were normal in appearance [Normal Oropharynx] : the oropharynx was normal [No JVD] : no jugular venous distention [No Lymphadenopathy] : no lymphadenopathy [Supple] : supple [Thyroid Normal, No Nodules] : the thyroid was normal and there were no nodules present [No Respiratory Distress] : no respiratory distress  [No Accessory Muscle Use] : no accessory muscle use [Clear to Auscultation] : lungs were clear to auscultation bilaterally [Normal Rate] : normal rate  [Regular Rhythm] : with a regular rhythm [Normal S1, S2] : normal S1 and S2 [No Murmur] : no murmur heard [No Carotid Bruits] : no carotid bruits [No Abdominal Bruit] : a ~M bruit was not heard ~T in the abdomen [No Varicosities] : no varicosities [Pedal Pulses Present] : the pedal pulses are present [No Edema] : there was no peripheral edema [No Palpable Aorta] : no palpable aorta [No Extremity Clubbing/Cyanosis] : no extremity clubbing/cyanosis [Soft] : abdomen soft [Non Tender] : non-tender [Non-distended] : non-distended [No Masses] : no abdominal mass palpated [No HSM] : no HSM [Normal Bowel Sounds] : normal bowel sounds [No CVA Tenderness] : no CVA  tenderness [No Spinal Tenderness] : no spinal tenderness [No Joint Swelling] : no joint swelling [Grossly Normal Strength/Tone] : grossly normal strength/tone [No Rash] : no rash [Coordination Grossly Intact] : coordination grossly intact [No Focal Deficits] : no focal deficits [Normal Gait] : normal gait [Deep Tendon Reflexes (DTR)] : deep tendon reflexes were 2+ and symmetric [Normal Affect] : the affect was normal [Normal Insight/Judgement] : insight and judgment were intact

## 2023-03-03 NOTE — HEALTH RISK ASSESSMENT
[Good] : ~his/her~  mood as  good [FreeTextEntry1] : doesn't have any "get up and go" [Yes] : Yes [2 - 3 times a week (3 pts)] : 2 - 3  times a week (3 points) [1 or 2 (0 pts)] : 1 or 2 (0 points) [Never (0 pts)] : Never (0 points) [No falls in past year] : Patient reported no falls in the past year [1] : 1) Little interest or pleasure doing things for several days (1) [0] : 2) Feeling down, depressed, or hopeless: Not at all (0) [PHQ-2 Positive] : PHQ-2 Positive [de-identified] : cardio [Audit-CScore] : 3 [de-identified] : physical activity [de-identified] : good [[id number]] : 1 [Patient reported colonoscopy was normal] : Patient reported colonoscopy was normal [HIV test declined] : HIV test declined [Hepatitis C test declined] : Hepatitis C test declined [Change in mental status noted] : No change in mental status noted [Language] : denies difficulty with language [Behavior] : denies difficulty with behavior [Learning/Retaining New Information] : denies difficulty learning/retaining new information [Handling Complex Tasks] : denies difficulty handling complex tasks [Reasoning] : denies difficulty with reasoning [Spatial Ability and Orientation] : denies difficulty with spatial ability and orientation [None] : None [With Significant Other] : lives with significant other [] :  [Sexually Active] : not sexually active [Feels Safe at Home] : Feels safe at home [Fully functional (bathing, dressing, toileting, transferring, walking, feeding)] : Fully functional (bathing, dressing, toileting, transferring, walking, feeding) [Fully functional (using the telephone, shopping, preparing meals, housekeeping, doing laundry, using] : Fully functional and needs no help or supervision to perform IADLs (using the telephone, shopping, preparing meals, housekeeping, doing laundry, using transportation, managing medications and managing finances) [Reports changes in hearing] : Reports no changes in hearing [Reports changes in vision] : Reports no changes in vision [Reports changes in dental health] : Reports no changes in dental health [ColonoscopyDate] : 2021 [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

## 2023-04-26 RX ORDER — TRAMADOL HYDROCHLORIDE 50 MG/1
50 TABLET, COATED ORAL
Qty: 90 | Refills: 0 | Status: ACTIVE | COMMUNITY
Start: 2021-05-10 | End: 1900-01-01

## 2024-01-16 RX ORDER — OMEPRAZOLE 20 MG/1
20 CAPSULE, DELAYED RELEASE ORAL DAILY
Qty: 90 | Refills: 3 | Status: ACTIVE | COMMUNITY
Start: 2021-06-29 | End: 1900-01-01

## 2024-01-21 NOTE — DISCHARGE NOTE PROVIDER - REASON FOR ADMISSION
Called and LVM for [facility] to call the office back. GI bleed/dizziness Resume tamoxifen 10mg qd post cath

## 2024-01-22 ENCOUNTER — NON-APPOINTMENT (OUTPATIENT)
Age: 74
End: 2024-01-22

## 2024-01-25 ENCOUNTER — NON-APPOINTMENT (OUTPATIENT)
Age: 74
End: 2024-01-25

## 2024-01-26 ENCOUNTER — APPOINTMENT (OUTPATIENT)
Dept: INTERNAL MEDICINE | Facility: CLINIC | Age: 74
End: 2024-01-26
Payer: MEDICARE

## 2024-01-26 VITALS
HEIGHT: 68 IN | HEART RATE: 64 BPM | WEIGHT: 200 LBS | SYSTOLIC BLOOD PRESSURE: 129 MMHG | OXYGEN SATURATION: 98 % | BODY MASS INDEX: 30.31 KG/M2 | RESPIRATION RATE: 14 BRPM | TEMPERATURE: 98 F | DIASTOLIC BLOOD PRESSURE: 80 MMHG

## 2024-01-26 DIAGNOSIS — K12.1 OTHER FORMS OF STOMATITIS: ICD-10-CM

## 2024-01-26 DIAGNOSIS — Z23 ENCOUNTER FOR IMMUNIZATION: ICD-10-CM

## 2024-01-26 PROCEDURE — G2211 COMPLEX E/M VISIT ADD ON: CPT

## 2024-01-26 PROCEDURE — 99214 OFFICE O/P EST MOD 30 MIN: CPT

## 2024-01-26 RX ORDER — VALACYCLOVIR 1 G/1
1 TABLET, FILM COATED ORAL 3 TIMES DAILY
Qty: 30 | Refills: 5 | Status: ACTIVE | COMMUNITY
Start: 2024-01-26 | End: 1900-01-01

## 2024-01-26 NOTE — PHYSICAL EXAM
[No Acute Distress] : no acute distress [Well Nourished] : well nourished [Well Developed] : well developed [Well-Appearing] : well-appearing [Normal Sclera/Conjunctiva] : normal sclera/conjunctiva [PERRL] : pupils equal round and reactive to light [EOMI] : extraocular movements intact [Normal Outer Ear/Nose] : the outer ears and nose were normal in appearance [No JVD] : no jugular venous distention [Normal Oropharynx] : the oropharynx was normal [No Lymphadenopathy] : no lymphadenopathy [Supple] : supple [Thyroid Normal, No Nodules] : the thyroid was normal and there were no nodules present [No Accessory Muscle Use] : no accessory muscle use [No Respiratory Distress] : no respiratory distress  [Clear to Auscultation] : lungs were clear to auscultation bilaterally [Normal Rate] : normal rate  [Normal S1, S2] : normal S1 and S2 [Regular Rhythm] : with a regular rhythm [No Murmur] : no murmur heard [No Carotid Bruits] : no carotid bruits [No Abdominal Bruit] : a ~M bruit was not heard ~T in the abdomen [No Edema] : there was no peripheral edema [No Varicosities] : no varicosities [Pedal Pulses Present] : the pedal pulses are present [No Extremity Clubbing/Cyanosis] : no extremity clubbing/cyanosis [No Palpable Aorta] : no palpable aorta [Non Tender] : non-tender [Non-distended] : non-distended [Soft] : abdomen soft [No HSM] : no HSM [No Masses] : no abdominal mass palpated [Normal Bowel Sounds] : normal bowel sounds [Normal Posterior Cervical Nodes] : no posterior cervical lymphadenopathy [Normal Anterior Cervical Nodes] : no anterior cervical lymphadenopathy [No CVA Tenderness] : no CVA  tenderness [No Spinal Tenderness] : no spinal tenderness [Grossly Normal Strength/Tone] : grossly normal strength/tone [No Joint Swelling] : no joint swelling [No Rash] : no rash [Coordination Grossly Intact] : coordination grossly intact [No Focal Deficits] : no focal deficits [Deep Tendon Reflexes (DTR)] : deep tendon reflexes were 2+ and symmetric [Normal Gait] : normal gait [Normal Insight/Judgement] : insight and judgment were intact [Normal Affect] : the affect was normal [de-identified] : left tongue posterior ulcer

## 2024-01-29 PROBLEM — Z23 ENCOUNTER FOR IMMUNIZATION: Status: ACTIVE | Noted: 2021-05-04

## 2024-02-02 ENCOUNTER — RX RENEWAL (OUTPATIENT)
Age: 74
End: 2024-02-02

## 2024-02-02 RX ORDER — LEVOTHYROXINE SODIUM 0.07 MG/1
75 TABLET ORAL DAILY
Qty: 90 | Refills: 3 | Status: ACTIVE | COMMUNITY
Start: 2020-11-03 | End: 1900-01-01

## 2024-02-12 ENCOUNTER — RX RENEWAL (OUTPATIENT)
Age: 74
End: 2024-02-12

## 2024-03-12 LAB
25(OH)D3 SERPL-MCNC: 38.8 NG/ML
ALBUMIN SERPL ELPH-MCNC: 4.4 G/DL
ALP BLD-CCNC: 60 U/L
ALT SERPL-CCNC: 20 U/L
ANION GAP SERPL CALC-SCNC: 12 MMOL/L
APPEARANCE: CLEAR
AST SERPL-CCNC: 20 U/L
BACTERIA: NEGATIVE /HPF
BASOPHILS # BLD AUTO: 0.07 K/UL
BASOPHILS NFR BLD AUTO: 0.8 %
BILIRUB SERPL-MCNC: 0.6 MG/DL
BILIRUBIN URINE: NEGATIVE
BLOOD URINE: NEGATIVE
BUN SERPL-MCNC: 10 MG/DL
CALCIUM SERPL-MCNC: 9.6 MG/DL
CAST: 0 /LPF
CHLORIDE SERPL-SCNC: 107 MMOL/L
CHOLEST SERPL-MCNC: 141 MG/DL
CO2 SERPL-SCNC: 24 MMOL/L
COLOR: NORMAL
CREAT SERPL-MCNC: 1.11 MG/DL
CRP SERPL HS-MCNC: 1.13 MG/L
EGFR: 71 ML/MIN/1.73M2
EOSINOPHIL # BLD AUTO: 0.12 K/UL
EOSINOPHIL NFR BLD AUTO: 1.3 %
EPITHELIAL CELLS: 3 /HPF
ESTIMATED AVERAGE GLUCOSE: 189 MG/DL
GLUCOSE QUALITATIVE U: 250 MG/DL
GLUCOSE SERPL-MCNC: 191 MG/DL
HBA1C MFR BLD HPLC: 8.2 %
HCT VFR BLD CALC: 40.6 %
HDLC SERPL-MCNC: 42 MG/DL
HGB BLD-MCNC: 13.6 G/DL
IMM GRANULOCYTES NFR BLD AUTO: 0.3 %
KETONES URINE: NEGATIVE MG/DL
LDLC SERPL CALC-MCNC: 77 MG/DL
LEUKOCYTE ESTERASE URINE: NEGATIVE
LYMPHOCYTES # BLD AUTO: 3.05 K/UL
LYMPHOCYTES NFR BLD AUTO: 33.4 %
MAN DIFF?: NORMAL
MCHC RBC-ENTMCNC: 31.8 PG
MCHC RBC-ENTMCNC: 33.5 GM/DL
MCV RBC AUTO: 94.9 FL
MICROSCOPIC-UA: NORMAL
MONOCYTES # BLD AUTO: 0.71 K/UL
MONOCYTES NFR BLD AUTO: 7.8 %
NEUTROPHILS # BLD AUTO: 5.15 K/UL
NEUTROPHILS NFR BLD AUTO: 56.4 %
NITRITE URINE: NEGATIVE
NONHDLC SERPL-MCNC: 99 MG/DL
PH URINE: 5.5
PLATELET # BLD AUTO: 254 K/UL
POTASSIUM SERPL-SCNC: 5.2 MMOL/L
PROT SERPL-MCNC: 7 G/DL
PROTEIN URINE: NORMAL MG/DL
PSA SERPL-MCNC: 1.43 NG/ML
RBC # BLD: 4.28 M/UL
RBC # FLD: 13.8 %
RED BLOOD CELLS URINE: 2 /HPF
SODIUM SERPL-SCNC: 143 MMOL/L
SPECIFIC GRAVITY URINE: 1.03
TRIGL SERPL-MCNC: 128 MG/DL
TSH SERPL-ACNC: 1.36 UIU/ML
UROBILINOGEN URINE: 1 MG/DL
WBC # FLD AUTO: 9.13 K/UL
WHITE BLOOD CELLS URINE: 6 /HPF

## 2024-03-15 ENCOUNTER — NON-APPOINTMENT (OUTPATIENT)
Age: 74
End: 2024-03-15

## 2024-03-15 ENCOUNTER — APPOINTMENT (OUTPATIENT)
Dept: INTERNAL MEDICINE | Facility: CLINIC | Age: 74
End: 2024-03-15
Payer: MEDICARE

## 2024-03-15 VITALS
DIASTOLIC BLOOD PRESSURE: 68 MMHG | TEMPERATURE: 97.7 F | BODY MASS INDEX: 29.86 KG/M2 | HEIGHT: 68 IN | SYSTOLIC BLOOD PRESSURE: 118 MMHG | RESPIRATION RATE: 14 BRPM | OXYGEN SATURATION: 97 % | WEIGHT: 197 LBS | HEART RATE: 88 BPM

## 2024-03-15 DIAGNOSIS — I25.10 ATHEROSCLEROTIC HEART DISEASE OF NATIVE CORONARY ARTERY W/OUT ANGINA PECTORIS: ICD-10-CM

## 2024-03-15 DIAGNOSIS — E11.9 TYPE 2 DIABETES MELLITUS W/OUT COMPLICATIONS: ICD-10-CM

## 2024-03-15 DIAGNOSIS — Z00.00 ENCOUNTER FOR GENERAL ADULT MEDICAL EXAMINATION W/OUT ABNORMAL FINDINGS: ICD-10-CM

## 2024-03-15 DIAGNOSIS — I48.91 UNSPECIFIED ATRIAL FIBRILLATION: ICD-10-CM

## 2024-03-15 PROCEDURE — 93000 ELECTROCARDIOGRAM COMPLETE: CPT | Mod: 59

## 2024-03-15 PROCEDURE — G0439: CPT

## 2024-03-15 PROCEDURE — G0444 DEPRESSION SCREEN ANNUAL: CPT

## 2024-03-15 RX ORDER — METFORMIN HYDROCHLORIDE 1000 MG/1
1000 TABLET, COATED ORAL
Qty: 180 | Refills: 3 | Status: ACTIVE | COMMUNITY
Start: 2020-11-03 | End: 1900-01-01

## 2024-03-15 NOTE — PHYSICAL EXAM
[No Acute Distress] : no acute distress [Well Nourished] : well nourished [Well Developed] : well developed [Normal Sclera/Conjunctiva] : normal sclera/conjunctiva [Well-Appearing] : well-appearing [PERRL] : pupils equal round and reactive to light [EOMI] : extraocular movements intact [Normal Outer Ear/Nose] : the outer ears and nose were normal in appearance [Normal Oropharynx] : the oropharynx was normal [No JVD] : no jugular venous distention [No Lymphadenopathy] : no lymphadenopathy [Supple] : supple [Thyroid Normal, No Nodules] : the thyroid was normal and there were no nodules present [No Respiratory Distress] : no respiratory distress  [No Accessory Muscle Use] : no accessory muscle use [Clear to Auscultation] : lungs were clear to auscultation bilaterally [Normal Rate] : normal rate  [Regular Rhythm] : with a regular rhythm [Normal S1, S2] : normal S1 and S2 [No Murmur] : no murmur heard [No Carotid Bruits] : no carotid bruits [No Abdominal Bruit] : a ~M bruit was not heard ~T in the abdomen [No Varicosities] : no varicosities [Pedal Pulses Present] : the pedal pulses are present [No Edema] : there was no peripheral edema [No Palpable Aorta] : no palpable aorta [Soft] : abdomen soft [No Extremity Clubbing/Cyanosis] : no extremity clubbing/cyanosis [Non Tender] : non-tender [Non-distended] : non-distended [No Masses] : no abdominal mass palpated [No HSM] : no HSM [Normal Bowel Sounds] : normal bowel sounds [No Spinal Tenderness] : no spinal tenderness [No CVA Tenderness] : no CVA  tenderness [No Joint Swelling] : no joint swelling [No Rash] : no rash [Grossly Normal Strength/Tone] : grossly normal strength/tone [Coordination Grossly Intact] : coordination grossly intact [No Focal Deficits] : no focal deficits [Normal Gait] : normal gait [Normal Insight/Judgement] : insight and judgment were intact [Deep Tendon Reflexes (DTR)] : deep tendon reflexes were 2+ and symmetric [Normal Affect] : the affect was normal

## 2024-03-15 NOTE — HEALTH RISK ASSESSMENT
[Good] : ~his/her~  mood as  good [2 - 3 times a week (3 pts)] : 2 - 3  times a week (3 points) [Yes] : Yes [1 or 2 (0 pts)] : 1 or 2 (0 points) [Never (0 pts)] : Never (0 points) [No falls in past year] : Patient reported no falls in the past year [1] : 1) Little interest or pleasure doing things for several days (1) [PHQ-2 Positive] : PHQ-2 Positive [0] : 2) Feeling down, depressed, or hopeless: Not at all (0) [Patient reported colonoscopy was normal] : Patient reported colonoscopy was normal [HIV test declined] : HIV test declined [Hepatitis C test declined] : Hepatitis C test declined [None] : None [With Significant Other] : lives with significant other [] :  [Feels Safe at Home] : Feels safe at home [Fully functional (bathing, dressing, toileting, transferring, walking, feeding)] : Fully functional (bathing, dressing, toileting, transferring, walking, feeding) [Fully functional (using the telephone, shopping, preparing meals, housekeeping, doing laundry, using] : Fully functional and needs no help or supervision to perform IADLs (using the telephone, shopping, preparing meals, housekeeping, doing laundry, using transportation, managing medications and managing finances) [FreeTextEntry1] : doesn't have any "get up and go" [de-identified] : cardio [Audit-CScore] : 3 [de-identified] : physical activity [de-identified] : good [[id number]] : 1 [Change in mental status noted] : No change in mental status noted [Language] : denies difficulty with language [Behavior] : denies difficulty with behavior [Learning/Retaining New Information] : denies difficulty learning/retaining new information [Handling Complex Tasks] : denies difficulty handling complex tasks [Reasoning] : denies difficulty with reasoning [Retired] : retired [Spatial Ability and Orientation] : denies difficulty with spatial ability and orientation [Sexually Active] : not sexually active [Reports changes in hearing] : Reports no changes in hearing [Reports changes in vision] : Reports no changes in vision [Reports changes in dental health] : Reports no changes in dental health [ColonoscopyDate] : 2021 [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

## 2024-03-15 NOTE — HISTORY OF PRESENT ILLNESS
[FreeTextEntry1] : Here for annual  [de-identified] : had bout of AFib per cardio, "It's showing up "

## 2024-11-06 NOTE — ED ADULT NURSE NOTE - IN THE PAST 12 MONTHS HAVE YOU USED DRUGS OTHER THAN THOSE REQUIRED FOR MEDICAL REASON?
Is This A New Presentation, Or A Follow-Up?: Rash
Additional History: Full body rash that comes and goes \\nStarted in July \\nWas treated as psoriasis with clobetasol 0.05% that helped immediately but rash still comes after awhile \\nCurrently experiencing a flare that started on Sunday
No

## 2024-12-06 ENCOUNTER — RX RENEWAL (OUTPATIENT)
Age: 74
End: 2024-12-06

## 2024-12-23 ENCOUNTER — INPATIENT (INPATIENT)
Facility: HOSPITAL | Age: 74
LOS: 1 days | Discharge: HOME | DRG: 65 | End: 2024-12-25
Attending: STUDENT IN AN ORGANIZED HEALTH CARE EDUCATION/TRAINING PROGRAM | Admitting: HOSPITALIST
Payer: MEDICARE

## 2024-12-23 VITALS
DIASTOLIC BLOOD PRESSURE: 98 MMHG | TEMPERATURE: 97 F | WEIGHT: 184.09 LBS | HEIGHT: 68 IN | SYSTOLIC BLOOD PRESSURE: 174 MMHG | HEART RATE: 105 BPM | RESPIRATION RATE: 17 BRPM

## 2024-12-23 DIAGNOSIS — R53.1 WEAKNESS: ICD-10-CM

## 2024-12-23 DIAGNOSIS — Z87.2 PERSONAL HISTORY OF DISEASES OF THE SKIN AND SUBCUTANEOUS TISSUE: Chronic | ICD-10-CM

## 2024-12-23 LAB
ALBUMIN SERPL ELPH-MCNC: 3.8 G/DL — SIGNIFICANT CHANGE UP (ref 3.3–5)
ALP SERPL-CCNC: 54 U/L — SIGNIFICANT CHANGE UP (ref 40–120)
ALT FLD-CCNC: 35 U/L — SIGNIFICANT CHANGE UP (ref 10–45)
ANION GAP SERPL CALC-SCNC: 10 MMOL/L — SIGNIFICANT CHANGE UP (ref 5–17)
APTT BLD: 32.6 SEC — SIGNIFICANT CHANGE UP (ref 24.5–35.6)
AST SERPL-CCNC: 35 U/L — SIGNIFICANT CHANGE UP (ref 10–40)
BASOPHILS # BLD AUTO: 0.04 K/UL — SIGNIFICANT CHANGE UP (ref 0–0.2)
BASOPHILS NFR BLD AUTO: 0.5 % — SIGNIFICANT CHANGE UP (ref 0–2)
BILIRUB SERPL-MCNC: 0.6 MG/DL — SIGNIFICANT CHANGE UP (ref 0.2–1.2)
BUN SERPL-MCNC: 8 MG/DL — SIGNIFICANT CHANGE UP (ref 7–23)
CALCIUM SERPL-MCNC: 9.5 MG/DL — SIGNIFICANT CHANGE UP (ref 8.4–10.5)
CHLORIDE SERPL-SCNC: 104 MMOL/L — SIGNIFICANT CHANGE UP (ref 96–108)
CO2 SERPL-SCNC: 25 MMOL/L — SIGNIFICANT CHANGE UP (ref 22–31)
CREAT SERPL-MCNC: 1.1 MG/DL — SIGNIFICANT CHANGE UP (ref 0.5–1.3)
EGFR: 71 ML/MIN/1.73M2 — SIGNIFICANT CHANGE UP
EOSINOPHIL # BLD AUTO: 0.07 K/UL — SIGNIFICANT CHANGE UP (ref 0–0.5)
EOSINOPHIL NFR BLD AUTO: 0.9 % — SIGNIFICANT CHANGE UP (ref 0–6)
GLUCOSE BLDC GLUCOMTR-MCNC: 149 MG/DL — HIGH (ref 70–99)
GLUCOSE BLDC GLUCOMTR-MCNC: 195 MG/DL — HIGH (ref 70–99)
GLUCOSE BLDC GLUCOMTR-MCNC: 196 MG/DL — HIGH (ref 70–99)
GLUCOSE SERPL-MCNC: 217 MG/DL — HIGH (ref 70–99)
HCT VFR BLD CALC: 38 % — LOW (ref 39–50)
HGB BLD-MCNC: 13 G/DL — SIGNIFICANT CHANGE UP (ref 13–17)
IMM GRANULOCYTES NFR BLD AUTO: 0.4 % — SIGNIFICANT CHANGE UP (ref 0–0.9)
INR BLD: 1.26 RATIO — HIGH (ref 0.85–1.16)
LYMPHOCYTES # BLD AUTO: 2.13 K/UL — SIGNIFICANT CHANGE UP (ref 1–3.3)
LYMPHOCYTES # BLD AUTO: 26.9 % — SIGNIFICANT CHANGE UP (ref 13–44)
MCHC RBC-ENTMCNC: 31.5 PG — SIGNIFICANT CHANGE UP (ref 27–34)
MCHC RBC-ENTMCNC: 34.2 G/DL — SIGNIFICANT CHANGE UP (ref 32–36)
MCV RBC AUTO: 92 FL — SIGNIFICANT CHANGE UP (ref 80–100)
MONOCYTES # BLD AUTO: 0.58 K/UL — SIGNIFICANT CHANGE UP (ref 0–0.9)
MONOCYTES NFR BLD AUTO: 7.3 % — SIGNIFICANT CHANGE UP (ref 2–14)
NEUTROPHILS # BLD AUTO: 5.07 K/UL — SIGNIFICANT CHANGE UP (ref 1.8–7.4)
NEUTROPHILS NFR BLD AUTO: 64 % — SIGNIFICANT CHANGE UP (ref 43–77)
NRBC # BLD: 0 /100 WBCS — SIGNIFICANT CHANGE UP (ref 0–0)
PLATELET # BLD AUTO: 249 K/UL — SIGNIFICANT CHANGE UP (ref 150–400)
POTASSIUM SERPL-MCNC: 4.3 MMOL/L — SIGNIFICANT CHANGE UP (ref 3.5–5.3)
POTASSIUM SERPL-SCNC: 4.3 MMOL/L — SIGNIFICANT CHANGE UP (ref 3.5–5.3)
PROT SERPL-MCNC: 7.5 G/DL — SIGNIFICANT CHANGE UP (ref 6–8.3)
PROTHROM AB SERPL-ACNC: 14.8 SEC — HIGH (ref 9.9–13.4)
RBC # BLD: 4.13 M/UL — LOW (ref 4.2–5.8)
RBC # FLD: 12.3 % — SIGNIFICANT CHANGE UP (ref 10.3–14.5)
SODIUM SERPL-SCNC: 139 MMOL/L — SIGNIFICANT CHANGE UP (ref 135–145)
TROPONIN I, HIGH SENSITIVITY RESULT: 8.9 NG/L — SIGNIFICANT CHANGE UP
WBC # BLD: 7.92 K/UL — SIGNIFICANT CHANGE UP (ref 3.8–10.5)
WBC # FLD AUTO: 7.92 K/UL — SIGNIFICANT CHANGE UP (ref 3.8–10.5)

## 2024-12-23 PROCEDURE — 70498 CT ANGIOGRAPHY NECK: CPT | Mod: 26,MC

## 2024-12-23 PROCEDURE — 93880 EXTRACRANIAL BILAT STUDY: CPT | Mod: 26

## 2024-12-23 PROCEDURE — 70496 CT ANGIOGRAPHY HEAD: CPT | Mod: 26,MC

## 2024-12-23 PROCEDURE — 70450 CT HEAD/BRAIN W/O DYE: CPT | Mod: 26,XU,MC

## 2024-12-23 PROCEDURE — 99223 1ST HOSP IP/OBS HIGH 75: CPT

## 2024-12-23 PROCEDURE — 93010 ELECTROCARDIOGRAM REPORT: CPT

## 2024-12-23 PROCEDURE — 71045 X-RAY EXAM CHEST 1 VIEW: CPT | Mod: 26

## 2024-12-23 PROCEDURE — 99285 EMERGENCY DEPT VISIT HI MDM: CPT

## 2024-12-23 RX ORDER — DEXTROSE MONOHYDRATE 25 G/50ML
15 INJECTION, SOLUTION INTRAVENOUS ONCE
Refills: 0 | Status: DISCONTINUED | OUTPATIENT
Start: 2024-12-23 | End: 2024-12-25

## 2024-12-23 RX ORDER — DEXTROSE MONOHYDRATE 25 G/50ML
25 INJECTION, SOLUTION INTRAVENOUS ONCE
Refills: 0 | Status: DISCONTINUED | OUTPATIENT
Start: 2024-12-23 | End: 2024-12-25

## 2024-12-23 RX ORDER — METFORMIN 850 MG/1
1 TABLET ORAL
Refills: 0 | DISCHARGE

## 2024-12-23 RX ORDER — ASPIRIN 81 MG
81 TABLET, DELAYED RELEASE (ENTERIC COATED) ORAL DAILY
Refills: 0 | Status: DISCONTINUED | OUTPATIENT
Start: 2024-12-23 | End: 2024-12-24

## 2024-12-23 RX ORDER — LEVOTHYROXINE SODIUM 175 UG/1
75 TABLET ORAL DAILY
Refills: 0 | Status: DISCONTINUED | OUTPATIENT
Start: 2024-12-23 | End: 2024-12-25

## 2024-12-23 RX ORDER — OMEPRAZOLE MAGNESIUM 20 MG/1
1 CAPSULE, DELAYED RELEASE ORAL
Refills: 0 | DISCHARGE

## 2024-12-23 RX ORDER — GLUCAGON INJECTION, SOLUTION 0.5 MG/.1ML
1 INJECTION, SOLUTION SUBCUTANEOUS ONCE
Refills: 0 | Status: DISCONTINUED | OUTPATIENT
Start: 2024-12-23 | End: 2024-12-25

## 2024-12-23 RX ORDER — LEVOTHYROXINE SODIUM 175 UG/1
1 TABLET ORAL
Refills: 0 | DISCHARGE

## 2024-12-23 RX ORDER — INSULIN LISPRO 100/ML
VIAL (ML) SUBCUTANEOUS
Refills: 0 | Status: DISCONTINUED | OUTPATIENT
Start: 2024-12-23 | End: 2024-12-25

## 2024-12-23 RX ORDER — PANTOPRAZOLE 40 MG/1
40 TABLET, DELAYED RELEASE ORAL
Refills: 0 | Status: DISCONTINUED | OUTPATIENT
Start: 2024-12-23 | End: 2024-12-25

## 2024-12-23 RX ORDER — ATORVASTATIN CALCIUM 40 MG/1
1 TABLET, FILM COATED ORAL
Refills: 0 | DISCHARGE

## 2024-12-23 RX ORDER — APIXABAN 5 MG/1
1 TABLET, FILM COATED ORAL
Refills: 0 | DISCHARGE

## 2024-12-23 RX ORDER — SODIUM CHLORIDE 9 MG/ML
500 INJECTION, SOLUTION INTRAMUSCULAR; INTRAVENOUS; SUBCUTANEOUS ONCE
Refills: 0 | Status: COMPLETED | OUTPATIENT
Start: 2024-12-23 | End: 2024-12-23

## 2024-12-23 RX ORDER — ONDANSETRON 4 MG/1
4 TABLET ORAL EVERY 8 HOURS
Refills: 0 | Status: DISCONTINUED | OUTPATIENT
Start: 2024-12-23 | End: 2024-12-25

## 2024-12-23 RX ORDER — B COMPLEX, C NO.20/FOLIC ACID 1 MG
1 CAPSULE ORAL
Refills: 0 | DISCHARGE

## 2024-12-23 RX ORDER — SODIUM CHLORIDE 9 MG/ML
1000 INJECTION, SOLUTION INTRAVENOUS
Refills: 0 | Status: DISCONTINUED | OUTPATIENT
Start: 2024-12-23 | End: 2024-12-25

## 2024-12-23 RX ORDER — MAG HYDROX/ALUMINUM HYD/SIMETH 200-200-20
30 SUSPENSION, ORAL (FINAL DOSE FORM) ORAL EVERY 4 HOURS
Refills: 0 | Status: DISCONTINUED | OUTPATIENT
Start: 2024-12-23 | End: 2024-12-25

## 2024-12-23 RX ORDER — GINKGO BILOBA 40 MG
3 CAPSULE ORAL AT BEDTIME
Refills: 0 | Status: DISCONTINUED | OUTPATIENT
Start: 2024-12-23 | End: 2024-12-25

## 2024-12-23 RX ORDER — APIXABAN 5 MG/1
5 TABLET, FILM COATED ORAL EVERY 12 HOURS
Refills: 0 | Status: DISCONTINUED | OUTPATIENT
Start: 2024-12-23 | End: 2024-12-25

## 2024-12-23 RX ORDER — ACETAMINOPHEN 80 MG/.8ML
650 SOLUTION/ DROPS ORAL EVERY 6 HOURS
Refills: 0 | Status: DISCONTINUED | OUTPATIENT
Start: 2024-12-23 | End: 2024-12-25

## 2024-12-23 RX ORDER — ATORVASTATIN CALCIUM 40 MG/1
80 TABLET, FILM COATED ORAL AT BEDTIME
Refills: 0 | Status: DISCONTINUED | OUTPATIENT
Start: 2024-12-23 | End: 2024-12-25

## 2024-12-23 RX ORDER — DIMENHYDRINATE 50 MG
1 TABLET ORAL
Refills: 0 | DISCHARGE

## 2024-12-23 RX ORDER — B COMPLEX, C NO.20/FOLIC ACID 1 MG
1 CAPSULE ORAL DAILY
Refills: 0 | Status: DISCONTINUED | OUTPATIENT
Start: 2024-12-23 | End: 2024-12-25

## 2024-12-23 RX ORDER — INSULIN LISPRO 100/ML
VIAL (ML) SUBCUTANEOUS AT BEDTIME
Refills: 0 | Status: DISCONTINUED | OUTPATIENT
Start: 2024-12-23 | End: 2024-12-25

## 2024-12-23 RX ORDER — DEXTROSE MONOHYDRATE 25 G/50ML
12.5 INJECTION, SOLUTION INTRAVENOUS ONCE
Refills: 0 | Status: DISCONTINUED | OUTPATIENT
Start: 2024-12-23 | End: 2024-12-25

## 2024-12-23 RX ADMIN — APIXABAN 5 MILLIGRAM(S): 5 TABLET, FILM COATED ORAL at 20:39

## 2024-12-23 RX ADMIN — SODIUM CHLORIDE 1000 MILLILITER(S): 9 INJECTION, SOLUTION INTRAMUSCULAR; INTRAVENOUS; SUBCUTANEOUS at 11:52

## 2024-12-23 RX ADMIN — ATORVASTATIN CALCIUM 80 MILLIGRAM(S): 40 TABLET, FILM COATED ORAL at 21:39

## 2024-12-23 NOTE — ED PROVIDER NOTE - PHYSICAL EXAMINATION
Vitals: I have reviewed the patients vital signs  General: nontoxic appearing  HEENT: Atraumatic, normocephalic, airway patent  Eyes: EOMI, tracking appropriately  Neck: no tracheal deviation  Chest/Lungs: no trauma, symmetric chest rise, speaking in complete sentences,  no resp distress  Heart: skin and extremities well perfused, regular rate and rhythm  Neuro: A+Ox3, ambulating without difficulty, appears non focal but it is notable that patient is putting in more effort with dysmetria testing with the LLE and LUE, although able. CN 3-12 intact. No pronator drift.   MSK: strength at baseline in all extremities, no muscle wasting or atrophy  Skin: no cyanosis, no jaundice n

## 2024-12-23 NOTE — H&P ADULT - NS ATTEND AMEND GEN_ALL_CORE FT
Patient seen and examined, chart reviewed  Patient has strength 5/5 in all extremities  No pronator drift  No significant dysmetria but slow to perform tasks.  patient denies dizziness but feels like he lost his balance and keeps running into objects despite seeing them    Plan as above    Discussed with patient and he said that he will update the wife.

## 2024-12-23 NOTE — PATIENT PROFILE ADULT - NSPROPTRIGHTNOTIFY_GEN_A_NUR
[FreeTextEntry1] : Hypertension.\par \par History of multiple syncopal episodes. \par      Event monitor recordings did not demonstrated an arrhythmia that would explain the events. \par      History most c/w vasovagal mechanism.\par \par History of hyperlipidemia, treated previously with medication.\par \par Diabetes, controlled by diet.
yes

## 2024-12-23 NOTE — H&P ADULT - HISTORY OF PRESENT ILLNESS
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 Patient presents to [facility] for lower leg weakness. Patient reports on Friday he felt a cramp in the left calf that prompt him to move out of the bed after that he is having trouble with his balance running into things and felt lightheaded.  Since then patient has feeling weak and having trouble to getting out of bed, standing up from seating position and unable to get dress. Patient called PCP Dr. Reyes how told him to go to the ED. Patient reports he experienced a similar episode in June this year and had a GI Ulcer.  He denies dizziness, blurry vision, fever, chills, headaches, chest pain, palpitations, N/D/V/C, Hematochezia, Melena, Urinary retention  or loss of sphincter control,  In the ED Vitals sign were /74, HR: 91, RR, 21, SpO2: 99%. He received NS 500cc Bolus, Non pertinent labs, Imaging Stroke protocol CTH, CT Head Angio , CT Neck:  No evidence of acute intracranial hemorrhage or midline shift. Chronic ischemic changes as discussed above. further evaluation. 1 to 2 mm vascular outpouching in the right ICA terminus, likely small vascular infundibulum versus small saccular aneurysm.  Atherosclerotic calcification and plaque in the bilateral carotid bulbs, resulting in moderate to severe luminal narrowing on the right. 0.7 cm nodule in the left lung apex. Admitted to the floors for further work up.

## 2024-12-23 NOTE — PATIENT PROFILE ADULT - HISTORY OF COVID-19 VACCINATION
Plan of care ongoing, fall risk management ongoing.
Thais SALAS notified.
Contacted Provider.
Patient needs assistance to ambulate, fall risk management and o2 as needed.
Monitor oxygen level and maintain fall precautions
Yes

## 2024-12-23 NOTE — ED PROVIDER NOTE - OBJECTIVE STATEMENT
74-year-old male presents to the emergency department accompanied by wife for unsteady gait.  Onset on Friday the 20th.  Wife states that she feels that is getting worse.  Patient had been stubborn and did not want to come in.  They contacted the primary care doctor who urged him to come in.  Patient has some weakness at baseline however never with unsteady gait where he is running into things.  No head injury or fall to the ground however patient states that he keeps running into the wall.  This is not like him.  He states he still does have 1 Guinness beer each day.  That is his baseline.  No vomiting.  No blurry vision.  No numbness or tingling.  Patient has history of CVA in 2016 with minor deficits although notable to the patient not notable to an outside physical exam  particularly.  History of A-fib on Eliquis.  Compliant with medications.  No history of hypertension.  History of afib, hypothyroidism, CVA, hypertension.

## 2024-12-23 NOTE — H&P ADULT - NSHPPHYSICALEXAM_GEN_ALL_CORE
Physical Exam: PHYSICAL EXAM:  GENERAL: Obese, NAD, Cooperative Examination   HEAD:  Atraumatic, Normocephalic  EYES: EOMI, PERRLA, conjunctiva and sclera clear,  ENT: Moist mucous membranes  NECK: Supple, No JVD  CHEST/LUNG: Clear to auscultation bilaterally. Unlabored respirations  HEART: Tachycardic. No murmurs, rubs, or gallops  ABDOMEN: Bowel sounds present; Soft, Nontender, Nondistended.  EXTREMITIES: No clubbing, edema or cyanosis.  NERVOUS SYSTEM:  Alert & Oriented x3 to person.   MSK: FROM all 4 extremities, full and equal strength  SKIN: No rashes or lesions

## 2024-12-23 NOTE — ED ADULT NURSE NOTE - NSFALLHARMRISKINTERV_ED_ALL_ED

## 2024-12-23 NOTE — ED PROVIDER NOTE - CLINICAL SUMMARY MEDICAL DECISION MAKING FREE TEXT BOX
74-year-old male presents to the emergency department accompanied by wife for unsteady gait.  Onset on Friday the 20th.  Wife states that she feels that is getting worse.  Patient had been stubborn and did not want to come in.  They contacted the primary care doctor who urged him to come in.  Patient has some weakness at baseline however never with unsteady gait where he is running into things.  No head injury or fall to the ground however patient states that he keeps running into the wall.  This is not like him.  He states he still does have 1 Guinness beer each day.  That is his baseline.  No vomiting.  No blurry vision.  No numbness or tingling.  Patient has history of CVA in 2016 with minor deficits although notable to the patient not notable to an outside physical exam  particularly.  History of A-fib on Eliquis.  Compliant with medications.  No history of hypertension.  History of afib, hypothyroidism, CVA, hypertension.  Exam as stated.   Plan for labs with CT/CTangio if BUN/Cr acceptable. 74-year-old male presents to the emergency department accompanied by wife for unsteady gait.  Onset on Friday the 20th.  Wife states that she feels that is getting worse.  Patient had been stubborn and did not want to come in.  They contacted the primary care doctor who urged him to come in.  Patient has some weakness at baseline however never with unsteady gait where he is running into things.  No head injury or fall to the ground however patient states that he keeps running into the wall.  This is not like him.  He states he still does have 1 Guinness beer each day.  That is his baseline.  No vomiting.  No blurry vision.  No numbness or tingling.  Patient has history of CVA in 2016 with minor deficits although notable to the patient not notable to an outside physical exam  particularly.  History of A-fib on Eliquis.  Compliant with medications.  No history of hypertension.  History of afib, hypothyroidism, CVA, hypertension.  Exam as stated.   Plan for labs with CT/CTangio if BUN/Cr acceptable.     Admit ; labs and CT reviewed. D/W HOspitalist for admission. Pt w unsteady gait. 74-year-old male presents to the emergency department accompanied by wife for unsteady gait.  Onset on Friday the 20th.  Wife states that she feels that is getting worse.  Patient had been stubborn and did not want to come in.  They contacted the primary care doctor who urged him to come in.  Patient has some weakness at baseline however never with unsteady gait where he is running into things.  No head injury or fall to the ground however patient states that he keeps running into the wall.  This is not like him.  He states he still does have 1 Guinness beer each day.  That is his baseline.  No vomiting.  No blurry vision.  No numbness or tingling.  Patient has history of CVA in 2016 with minor deficits although notable to the patient not notable to an outside physical exam  particularly.  History of A-fib on Eliquis.  Compliant with medications.  No history of hypertension.  History of afib, hypothyroidism, CVA, hypertension.  Exam as stated.   Plan for labs with CT/CTangio if BUN/Cr acceptable. Pt last normal was 12/20. Not a candidate for Tenecteplase due to this timeline.     Admit ; labs and CT reviewed. D/W HOspitalist for admission. Pt w unsteady gait.

## 2024-12-23 NOTE — PATIENT PROFILE ADULT - NSPROHMDIABETMGMTSTRAT_GEN_A_NUR
Patient states that he doesn't monitor his BS. He goes to the doctor or fire department for BS check up/none

## 2024-12-23 NOTE — ED ADULT NURSE NOTE - CHIEF COMPLAINT QUOTE
Pt c/o unsteady gait and generalized weakness since he woke up on 12/20.. Hx Afib on Eliqius and CVA in 2016

## 2024-12-23 NOTE — PATIENT PROFILE ADULT - FALL HARM RISK - HARM RISK INTERVENTIONS
Assistance with ambulation/Assistance OOB with selected safe patient handling equipment/Communicate Risk of Fall with Harm to all staff/Monitor for mental status changes/Monitor gait and stability/Reinforce activity limits and safety measures with patient and family/Tailored Fall Risk Interventions/Toileting schedule using arm’s reach rule for commode and bathroom/Use of alarms - bed, chair and/or voice tab/Visual Cue: Yellow wristband and red socks/Bed in lowest position, wheels locked, appropriate side rails in place/Call bell, personal items and telephone in reach/Instruct patient to call for assistance before getting out of bed or chair/Non-slip footwear when patient is out of bed/[place] to call system/Physically safe environment - no spills, clutter or unnecessary equipment/Purposeful Proactive Rounding/Room/bathroom lighting operational, light cord in reach

## 2024-12-23 NOTE — H&P ADULT - NSHPREVIEWOFSYSTEMS_GEN_ALL_CORE
REVIEW OF SYSTEMS:    CONSTITUTIONAL: b/l lower leg weakness, fevers or chills  EYES/ENT: No visual changes;  No vertigo or throat pain   NECK: No pain or stiffness  RESPIRATORY: No cough, wheezing, hemoptysis; No shortness of breath  CARDIOVASCULAR: No chest pain or palpitations  GASTROINTESTINAL: No abdominal or epigastric pain. No nausea, vomiting; No diarrhea or constipation.   GENITOURINARY: No dysuria, frequency or hematuria  NEUROLOGICAL: Weakness   SKIN: No itching, rashes

## 2024-12-23 NOTE — ED ADULT NURSE NOTE - OBJECTIVE STATEMENT
Pt. to ED stating on 12/19/24 he went to bed fine but when he woke up on 12/20/24 he had a pain to his right calf and since then he has been "off".  Pt. states he has been unsteady and has been falling into walls.  Pt. states that he always feels slightly weak but now is struggling to just get into bed.  Pt. states it is hard to hold his head up.  Pt. denies any fever or chills.  Pt. is afebrile.  Pt. in a fib which is chronic.  Worsening weakness noted to left side.

## 2024-12-23 NOTE — H&P ADULT - ASSESSMENT
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 Patient presents for B/L lower leg weakness and steady gait. Admitted for further workup.    #Weakness  #Unsteady gait  -Patient had a CVA in 2016  -Weakness since Friday  -CT Stroke Protocol Negative   -Fall precautions  -Neuro q4h  -Lipid panel   -Labs in the AM   -Aspirin 81mg daily  -Atorvastatin 80mg  -c/w Home Eliquis  -TTE ordered   -Carotid dopplers ordered   -MRI Head ordered  -Neuro consult    #Accidental finding on Imaging   - 0.7 cm Nodule in the left lung apex.  -F/u out patient     #GERD  #Hx of Bleeding Ulcer  -c/w home Pantoprazole 20mg     #Alcohol use  -Pt has beer daily  -Monitor for Withdrawals symptoms   - Will hold CIWA protocol for now     #Chronic A-fib  -Continuous Telemetry  -c/w Home Eliquis    #Type 2 Diabetes Mellitus  -hold home Metformin   -Diet: Consistent Carbohydrate Diet  -ISS  -A1c in the Morning     #Hypothyroidism  -c/w Home Levothyroxine    #HLD  -At home Atorvastatin 20mg increased to 80mg for now    #DVT ppx  -c/w Home Eliquis    #GOC  -Full code    Case d/w Dr. Ray          75 y/o male with past medical hx of A-fib on Eliquis, Type 2 DM, HLD, Hypothyroidism, HDL and CVA (2016) with minor residual symptoms. Patient presents for B/L lower leg weakness and steady gait. Admitted for further workup.    #Weakness  #Unsteady gait  -Patient had a CVA in 2016  -Weakness since Friday  -CT Stroke Protocol Negative   -Fall precautions  -Neuro q4h  -Lipid panel   -Labs in the AM   -Aspirin 81mg daily  -Atorvastatin 80mg  -c/w Home Eliquis  -TTE ordered   -Carotid dopplers ordered   -MRI Head ordered  -Neuro consult    #Accidental finding on Imaging   - 0.7 cm Nodule in the left lung apex.  -F/u out patient     #GERD  #Hx of Bleeding Ulcer  -c/w home Pantoprazole 20mg     #Alcohol use  -Pt has 1 beer daily  -Monitor for Withdrawals symptoms   - pt denies any prior symptoms of withdrawal    #Chronic A-fib  -Continuous Telemetry  -c/w Home Eliquis    #Type 2 Diabetes Mellitus  -hold home Metformin   -Diet: Consistent Carbohydrate Diet  -ISS  -A1c in the Morning     #Hypothyroidism  -c/w Home Levothyroxine    #HLD  -At home Atorvastatin 20mg increased to 80mg for now    #DVT ppx  -c/w Home Eliquis    #GOC  -Full code    Case d/w Dr. Ray

## 2024-12-23 NOTE — ED ADULT TRIAGE NOTE - NS ED TRIAGE AVPU SCALE
Alert-The patient is alert, awake and responds to voice. The patient is oriented to time, place, and person. The triage nurse is able to obtain subjective information. Bilateral Rotation Flap Text: The defect edges were debeveled with a #15 scalpel blade. Given the location of the defect, shape of the defect and the proximity to free margins a bilateral rotation flap was deemed most appropriate. Using a sterile surgical marker, an appropriate rotation flap was drawn incorporating the defect and placing the expected incisions within the relaxed skin tension lines where possible. The area thus outlined was incised deep to adipose tissue with a #15 scalpel blade. The skin margins were undermined to an appropriate distance in all directions utilizing iris scissors. Following this, the designed flap was carried over into the primary defect and sutured into place.

## 2024-12-24 ENCOUNTER — TRANSCRIPTION ENCOUNTER (OUTPATIENT)
Age: 74
End: 2024-12-24

## 2024-12-24 ENCOUNTER — RESULT REVIEW (OUTPATIENT)
Age: 74
End: 2024-12-24

## 2024-12-24 LAB
A1C WITH ESTIMATED AVERAGE GLUCOSE RESULT: 8.6 % — HIGH (ref 4–5.6)
ALBUMIN SERPL ELPH-MCNC: 3.8 G/DL — SIGNIFICANT CHANGE UP (ref 3.3–5)
ALP SERPL-CCNC: 59 U/L — SIGNIFICANT CHANGE UP (ref 40–120)
ALT FLD-CCNC: 41 U/L — SIGNIFICANT CHANGE UP (ref 10–45)
ANION GAP SERPL CALC-SCNC: 11 MMOL/L — SIGNIFICANT CHANGE UP (ref 5–17)
ANION GAP SERPL CALC-SCNC: 8 MMOL/L — SIGNIFICANT CHANGE UP (ref 5–17)
AST SERPL-CCNC: 35 U/L — SIGNIFICANT CHANGE UP (ref 10–40)
BILIRUB SERPL-MCNC: 1 MG/DL — SIGNIFICANT CHANGE UP (ref 0.2–1.2)
BUN SERPL-MCNC: 14 MG/DL — SIGNIFICANT CHANGE UP (ref 7–23)
BUN SERPL-MCNC: 7 MG/DL — SIGNIFICANT CHANGE UP (ref 7–23)
CALCIUM SERPL-MCNC: 9.2 MG/DL — SIGNIFICANT CHANGE UP (ref 8.4–10.5)
CALCIUM SERPL-MCNC: 9.3 MG/DL — SIGNIFICANT CHANGE UP (ref 8.4–10.5)
CHLORIDE SERPL-SCNC: 103 MMOL/L — SIGNIFICANT CHANGE UP (ref 96–108)
CHLORIDE SERPL-SCNC: 105 MMOL/L — SIGNIFICANT CHANGE UP (ref 96–108)
CHOLEST SERPL-MCNC: 152 MG/DL — SIGNIFICANT CHANGE UP
CO2 SERPL-SCNC: 23 MMOL/L — SIGNIFICANT CHANGE UP (ref 22–31)
CO2 SERPL-SCNC: 26 MMOL/L — SIGNIFICANT CHANGE UP (ref 22–31)
CREAT SERPL-MCNC: 1.06 MG/DL — SIGNIFICANT CHANGE UP (ref 0.5–1.3)
CREAT SERPL-MCNC: 1.24 MG/DL — SIGNIFICANT CHANGE UP (ref 0.5–1.3)
EGFR: 62 ML/MIN/1.73M2 — SIGNIFICANT CHANGE UP
EGFR: 74 ML/MIN/1.73M2 — SIGNIFICANT CHANGE UP
ESTIMATED AVERAGE GLUCOSE: 200 MG/DL — HIGH (ref 68–114)
GLUCOSE BLDC GLUCOMTR-MCNC: 142 MG/DL — HIGH (ref 70–99)
GLUCOSE BLDC GLUCOMTR-MCNC: 160 MG/DL — HIGH (ref 70–99)
GLUCOSE BLDC GLUCOMTR-MCNC: 175 MG/DL — HIGH (ref 70–99)
GLUCOSE BLDC GLUCOMTR-MCNC: 186 MG/DL — HIGH (ref 70–99)
GLUCOSE SERPL-MCNC: 162 MG/DL — HIGH (ref 70–99)
GLUCOSE SERPL-MCNC: 233 MG/DL — HIGH (ref 70–99)
HCT VFR BLD CALC: 38.7 % — LOW (ref 39–50)
HDLC SERPL-MCNC: 33 MG/DL — LOW
HGB BLD-MCNC: 13.3 G/DL — SIGNIFICANT CHANGE UP (ref 13–17)
LIPID PNL WITH DIRECT LDL SERPL: 93 MG/DL — SIGNIFICANT CHANGE UP
MCHC RBC-ENTMCNC: 31.5 PG — SIGNIFICANT CHANGE UP (ref 27–34)
MCHC RBC-ENTMCNC: 34.4 G/DL — SIGNIFICANT CHANGE UP (ref 32–36)
MCV RBC AUTO: 91.7 FL — SIGNIFICANT CHANGE UP (ref 80–100)
NON HDL CHOLESTEROL: 119 MG/DL — SIGNIFICANT CHANGE UP
NRBC # BLD: 0 /100 WBCS — SIGNIFICANT CHANGE UP (ref 0–0)
PLATELET # BLD AUTO: 246 K/UL — SIGNIFICANT CHANGE UP (ref 150–400)
POTASSIUM SERPL-MCNC: 3.9 MMOL/L — SIGNIFICANT CHANGE UP (ref 3.5–5.3)
POTASSIUM SERPL-MCNC: 3.9 MMOL/L — SIGNIFICANT CHANGE UP (ref 3.5–5.3)
POTASSIUM SERPL-SCNC: 3.9 MMOL/L — SIGNIFICANT CHANGE UP (ref 3.5–5.3)
POTASSIUM SERPL-SCNC: 3.9 MMOL/L — SIGNIFICANT CHANGE UP (ref 3.5–5.3)
PROT SERPL-MCNC: 7.5 G/DL — SIGNIFICANT CHANGE UP (ref 6–8.3)
RBC # BLD: 4.22 M/UL — SIGNIFICANT CHANGE UP (ref 4.2–5.8)
RBC # FLD: 12.5 % — SIGNIFICANT CHANGE UP (ref 10.3–14.5)
SODIUM SERPL-SCNC: 137 MMOL/L — SIGNIFICANT CHANGE UP (ref 135–145)
SODIUM SERPL-SCNC: 139 MMOL/L — SIGNIFICANT CHANGE UP (ref 135–145)
TRIGL SERPL-MCNC: 149 MG/DL — SIGNIFICANT CHANGE UP
WBC # BLD: 7.91 K/UL — SIGNIFICANT CHANGE UP (ref 3.8–10.5)
WBC # FLD AUTO: 7.91 K/UL — SIGNIFICANT CHANGE UP (ref 3.8–10.5)

## 2024-12-24 PROCEDURE — 93306 TTE W/DOPPLER COMPLETE: CPT | Mod: 26

## 2024-12-24 PROCEDURE — 99233 SBSQ HOSP IP/OBS HIGH 50: CPT | Mod: GC

## 2024-12-24 PROCEDURE — 99222 1ST HOSP IP/OBS MODERATE 55: CPT

## 2024-12-24 PROCEDURE — 70551 MRI BRAIN STEM W/O DYE: CPT | Mod: 26

## 2024-12-24 PROCEDURE — 99223 1ST HOSP IP/OBS HIGH 75: CPT | Mod: GC

## 2024-12-24 RX ADMIN — APIXABAN 5 MILLIGRAM(S): 5 TABLET, FILM COATED ORAL at 17:25

## 2024-12-24 RX ADMIN — ATORVASTATIN CALCIUM 80 MILLIGRAM(S): 40 TABLET, FILM COATED ORAL at 22:03

## 2024-12-24 RX ADMIN — APIXABAN 5 MILLIGRAM(S): 5 TABLET, FILM COATED ORAL at 06:48

## 2024-12-24 RX ADMIN — Medication 1: at 12:20

## 2024-12-24 RX ADMIN — PANTOPRAZOLE 40 MILLIGRAM(S): 40 TABLET, DELAYED RELEASE ORAL at 06:39

## 2024-12-24 RX ADMIN — LEVOTHYROXINE SODIUM 75 MICROGRAM(S): 175 TABLET ORAL at 06:40

## 2024-12-24 RX ADMIN — Medication 1: at 08:25

## 2024-12-24 RX ADMIN — Medication 81 MILLIGRAM(S): at 12:21

## 2024-12-24 RX ADMIN — Medication 1: at 17:25

## 2024-12-24 RX ADMIN — Medication 1 TABLET(S): at 12:21

## 2024-12-24 NOTE — PROGRESS NOTE ADULT - ATTENDING COMMENTS
75 y/o male with past medical hx of A-fib on Eliquis, Type 2 DM, HLD, Hypothyroidism, CVA (2016) admitted with unsteady gate and leg weakness.    At the time of evaluation, patient stated that weakness has improved. No other complaints.  T(C): 36.4 (12-24-24 @ 05:31), Max: 37.4 (12-23-24 @ 20:19)  T(F): 97.5 (12-24-24 @ 05:31), Max: 99.4 (12-23-24 @ 20:19)  HR: 90 (12-24-24 @ 05:31) (72 - 120)  BP: 165/83 (12-24-24 @ 05:31) (148/74 - 171/86)  ABP: --  ABP(mean): --  RR: 18 (12-24-24 @ 05:31) (18 - 21)  SpO2: 98% (12-24-24 @ 05:31) (95% - 99%)    on exam aaox3, no apparent distress, both lungs clear, s1, s2, regular, abdomen- soft, no pedal edema, strength appears equal in all extremities, no drift                          13.3   7.91  )-----------( 246      ( 24 Dec 2024 06:00 )             38.7   12-24    139  |  105  |  7   ----------------------------<  162[H]  3.9   |  26  |  1.06    Ca    9.2      24 Dec 2024 06:00    TPro  7.5  /  Alb  3.8  /  TBili  1.0  /  DBili  x   /  AST  35  /  ALT  41  /  AlkPhos  59  12-24   MRI reviewed showed multiple right cerebral infarcts, two left frontal infarcts    a/p:  # Acute stroke- Not a candidate for TNK, last known well time not clear. continue eliquis, strict control of vascular risk factors. PT therapy and eval. left carotid artery with significant stenosis. will get vascular consult. check echo, lipid panel, hba1c. ?? failure of apixaban.  # Chronic atrial fibrillation- continue apixaban, ? not on rate control. cardio consult pending.   # DM2 with hyperglycemia- insulin and finger stick monitoring. check hba1c  # HLD- statin  # Hypothyroidism- levothyroxine  - rest as per resident note  disch dispo- medically active 48-72 hrs.

## 2024-12-24 NOTE — DISCHARGE NOTE PROVIDER - HOSPITAL COURSE
HPI:  75 y/o male with past medical hx of A-fib on Eliquis, Type 2 DM, HLD, Hypothyroidism, HDL and CVA (2016) with minor residual symptoms. Patient presents to [facility] for lower leg weakness. Patient reports on Friday he felt a cramp in the left calf that prompt him to move out of the bed after that he is having trouble with his balance running into things and felt lightheaded.  Since then patient has feeling weak and having trouble to getting out of bed, standing up from seating position and unable to get dress. Patient called PCP Dr. Reyes how told him to go to the ED. Patient reports he experienced a similar episode in June this year and had a GI Ulcer.  He denies dizziness, blurry vision, fever, chills, headaches, chest pain, palpitations, N/D/V/C, Hematochezia, Melena, Urinary retention  or loss of sphincter control,  In the ED Vitals sign were /74, HR: 91, RR, 21, SpO2: 99%. He received NS 500cc Bolus, Non pertinent labs, Imaging Stroke protocol CTH, CT Head Angio , CT Neck:  No evidence of acute intracranial hemorrhage or midline shift. Chronic ischemic changes as discussed above. further evaluation. 1 to 2 mm vascular outpouching in the right ICA terminus, likely small vascular infundibulum versus small saccular aneurysm.  Atherosclerotic calcification and plaque in the bilateral carotid bulbs, resulting in moderate to severe luminal narrowing on the right. 0.7 cm nodule in the left lung apex. Admitted to the floors for further work up.         Hospital Course Summary: Patient was admitted for further work up to rule stroke. MRI head was ordered and found to haven Numerous acute right cerebral hemisphere infarctions. 2 tiny left frontal lobe infarcts. Neurologist was consulted and recommended outpatient extensive cardiac work up, concern about embolic disease secondary to atrial fibrillation. Cardiologist was consulted and recommended _____  Physiatry was consulted and recommend in patient rehabilitation.             ---  VITALS:   Vital Signs Last 24 Hrs  T(F): 97.5 (24 Dec 2024 12:58), Max: 99.4 (23 Dec 2024 20:19)  HR: 90 (24 Dec 2024 12:58) (90 - 120)  BP: 160/80 (24 Dec 2024 12:58) (158/91 - 165/83)  RR: 18 (24 Dec 2024 12:58) (18 - 18)  SpO2: 98% (24 Dec 2024 12:58) (95% - 98%)  ---  PHYSICAL EXAM:   General: Awake and alert, cooperative with exam. No acute distress.   Cardiology: Normal S1, S2. No murmurs. Regular rate and rhythm.   Respiratory: Lungs clear to ascultation bilaterally. No wheezes, rales, or rhonchi.   Gastrointestinal: Positive bowel sounds. Soft. Non-tender. Non-distended. No guarding, rigidity, or rebound tenderness.  Extremities: No peripheral edema bilaterally.  Neurological: A+Ox3. CN 2-12 intact. No focal neurological deficits. Normal speech. No facial droop.  ---        Discharging Provider:  , MD   Contact Info: [phone]            HPI:  73 y/o male with past medical hx of A-fib on Eliquis, Type 2 DM, HLD, Hypothyroidism, HDL and CVA (2016) with minor residual symptoms. Patient presents to [facility] for lower leg weakness. Patient reports on Friday he felt a cramp in the left calf that prompt him to move out of the bed after that he is having trouble with his balance running into things and felt lightheaded.  Since then patient has feeling weak and having trouble to getting out of bed, standing up from seating position and unable to get dress. Patient called PCP Dr. Reyes how told him to go to the ED. Patient reports he experienced a similar episode in June this year and had a GI Ulcer.  He denies dizziness, blurry vision, fever, chills, headaches, chest pain, palpitations, N/D/V/C, Hematochezia, Melena, Urinary retention  or loss of sphincter control,  In the ED Vitals sign were /74, HR: 91, RR, 21, SpO2: 99%. He received NS 500cc Bolus, Non pertinent labs, Imaging Stroke protocol CTH, CT Head Angio , CT Neck:  No evidence of acute intracranial hemorrhage or midline shift. Chronic ischemic changes as discussed above. further evaluation. 1 to 2 mm vascular outpouching in the right ICA terminus, likely small vascular infundibulum versus small saccular aneurysm.  Atherosclerotic calcification and plaque in the bilateral carotid bulbs, resulting in moderate to severe luminal narrowing on the right external carotid artery and 0.7 cm nodule in the left lung apex. Admitted to the floors for further work up.         Hospital Course Summary: Patient was admitted for further work up to rule stroke. MRI head was ordered and found to haven Numerous acute right cerebral hemisphere infarctions. 2 tiny left frontal lobe infarcts. Neurologist was consulted and recommended outpatient extensive cardiac work up, concern about embolic disease secondary to atrial fibrillation. Patient will continue on Eliquis and Statins.  Cardiologist was consulted and recommended _____  Patient needs to follow up Vascular outpatient for moderate to severe luminal narrowing on the right external carotid artery abd with pulmonologist for nodule in the left lung apex.       ---  VITALS:   Vital Signs Last 24 Hrs  T(C): 36.5 (25 Dec 2024 05:08), Max: 36.6 (24 Dec 2024 21:26)  T(F): 97.7 (25 Dec 2024 05:08), Max: 97.8 (24 Dec 2024 21:26)  HR: 88 (25 Dec 2024 05:08) (81 - 90)  BP: 144/81 (25 Dec 2024 05:08) (144/81 - 160/80)  BP(mean): --  RR: 16 (25 Dec 2024 05:08) (16 - 18)  SpO2: 98% (25 Dec 2024 05:08) (98% - 98%)    Parameters below as of 25 Dec 2024 05:08  Patient On (Oxygen Delivery Method): room air      ---  PHYSICAL EXAM:   General: Awake and alert, cooperative with exam. No acute distress.   Cardiology: Normal S1, S2. No murmurs. Regular rate and rhythm.   Respiratory: Lungs clear to ascultation bilaterally. No wheezes, rales, or rhonchi.   Gastrointestinal: Positive bowel sounds. Soft. Non-tender. Non-distended. No guarding, rigidity, or rebound tenderness.  Extremities: No peripheral edema bilaterally.  Neurological: A+Ox3. CN 2-12 intact. No focal neurological deficits. Normal speech. No facial droop.  ---        Discharging Provider:  Sonja Mireles MD   Contact Info: [phone]            HPI:  73 y/o male with past medical hx of A-fib on Eliquis, Type 2 DM, HLD, Hypothyroidism, HDL and CVA (2016) with minor residual symptoms. Patient presents to [facility] for lower leg weakness. Patient reports on Friday he felt a cramp in the left calf that prompt him to move out of the bed after that he is having trouble with his balance running into things and felt lightheaded.  Since then patient has feeling weak and having trouble to getting out of bed, standing up from seating position and unable to get dress. Patient called PCP Dr. Reyes how told him to go to the ED. Patient reports he experienced a similar episode in June this year and had a GI Ulcer.  He denies dizziness, blurry vision, fever, chills, headaches, chest pain, palpitations, N/D/V/C, Hematochezia, Melena, Urinary retention  or loss of sphincter control,  In the ED Vitals sign were /74, HR: 91, RR, 21, SpO2: 99%. He received NS 500cc Bolus, Non pertinent labs, Imaging Stroke protocol CTH, CT Head Angio , CT Neck:  No evidence of acute intracranial hemorrhage or midline shift. Chronic ischemic changes as discussed above. further evaluation. 1 to 2 mm vascular outpouching in the right ICA terminus, likely small vascular infundibulum versus small saccular aneurysm.  Atherosclerotic calcification and plaque in the bilateral carotid bulbs, resulting in moderate to severe luminal narrowing on the right external carotid artery and 0.7 cm nodule in the left lung apex. Admitted to the floors for further work up.         Hospital Course Summary: Patient was admitted for further work up to rule stroke. MRI head was ordered and found to haven Numerous acute right cerebral hemisphere infarctions. 2 tiny left frontal lobe infarcts. Neurologist was consulted and recommended outpatient extensive cardiac work up, concern about embolic disease secondary to atrial fibrillation. Patient will continue on Eliquis and Statins.  Cardiologist was consulted and recommended _____  Patient needs to follow up Vascular outpatient for moderate to severe luminal narrowing on the right external carotid artery abd with pulmonologist for nodule in the left lung apex.       ---  VITALS:   Vital Signs Last 24 Hrs  T(C): 36.5 (25 Dec 2024 05:08), Max: 36.6 (24 Dec 2024 21:26)  T(F): 97.7 (25 Dec 2024 05:08), Max: 97.8 (24 Dec 2024 21:26)  HR: 88 (25 Dec 2024 05:08) (81 - 90)  BP: 144/81 (25 Dec 2024 05:08) (144/81 - 160/80)  BP(mean): --  RR: 16 (25 Dec 2024 05:08) (16 - 18)  SpO2: 98% (25 Dec 2024 05:08) (98% - 98%)    Parameters below as of 25 Dec 2024 05:08  Patient On (Oxygen Delivery Method): room air      ---  PHYSICAL EXAM:   General: Awake and alert, cooperative with exam. No acute distress.   Cardiology: Normal S1, S2. No murmurs. Regular rate and rhythm.   Respiratory: Lungs clear to ascultation bilaterally. No wheezes, rales, or rhonchi.   Gastrointestinal: Positive bowel sounds. Soft. Non-tender. Non-distended. No guarding, rigidity, or rebound tenderness.  Extremities: No peripheral edema bilaterally.  Neurological: A+Ox3. CN 2-12 intact. No focal neurological deficits. Normal speech. No facial droop.  ---        Discharging Provider:  Sonja Mireles MD   Contact Info: [phone]      # Acute stroke  # Chronic atrial fibrillation  # DM2 with hyperglycemia  # HLD  # Hypothyroidism        HPI:  75 y/o male with past medical hx of A-fib on Eliquis, Type 2 DM, HLD, Hypothyroidism, HDL and CVA (2016) with minor residual symptoms. Patient presents to [facility] for lower leg weakness. Patient reports on Friday he felt a cramp in the left calf that prompt him to move out of the bed after that he is having trouble with his balance running into things and felt lightheaded.  Since then patient has feeling weak and having trouble to getting out of bed, standing up from seating position and unable to get dress. Patient called PCP Dr. Reyes how told him to go to the ED. Patient reports he experienced a similar episode in June this year and had a GI Ulcer.  He denies dizziness, blurry vision, fever, chills, headaches, chest pain, palpitations, N/D/V/C, Hematochezia, Melena, Urinary retention  or loss of sphincter control,  In the ED Vitals sign were /74, HR: 91, RR, 21, SpO2: 99%. He received NS 500cc Bolus, Non pertinent labs, Imaging Stroke protocol CTH, CT Head Angio , CT Neck:  No evidence of acute intracranial hemorrhage or midline shift. Chronic ischemic changes as discussed above. further evaluation. 1 to 2 mm vascular outpouching in the right ICA terminus, likely small vascular infundibulum versus small saccular aneurysm.  Atherosclerotic calcification and plaque in the bilateral carotid bulbs, resulting in moderate to severe luminal narrowing on the right external carotid artery and 0.7 cm nodule in the left lung apex. Admitted to the floors for further work up.         Hospital Course Summary: Patient was admitted for further work up to rule stroke. MRI head was ordered and found to haven Numerous acute right cerebral hemisphere infarctions. 2 tiny left frontal lobe infarcts. Neurologist was consulted and recommended outpatient extensive cardiac work up, concern about embolic disease secondary to atrial fibrillation. Patient will continue on Eliquis and Statins.  Cardiologist was consulted and recommended _____  Patient needs to follow up Vascular outpatient for moderate to severe luminal narrowing on the right external carotid artery abd with pulmonologist for nodule in the left lung apex.       ---  VITALS:   Vital Signs Last 24 Hrs  T(C): 36.5 (25 Dec 2024 05:08), Max: 36.6 (24 Dec 2024 21:26)  T(F): 97.7 (25 Dec 2024 05:08), Max: 97.8 (24 Dec 2024 21:26)  HR: 88 (25 Dec 2024 05:08) (81 - 90)  BP: 144/81 (25 Dec 2024 05:08) (144/81 - 160/80)  BP(mean): --  RR: 16 (25 Dec 2024 05:08) (16 - 18)  SpO2: 98% (25 Dec 2024 05:08) (98% - 98%)    Parameters below as of 25 Dec 2024 05:08  Patient On (Oxygen Delivery Method): room air      ---  PHYSICAL EXAM:   General: Awake and alert, cooperative with exam. No acute distress.   Cardiology: Normal S1, S2. No murmurs. Regular rate and rhythm.   Respiratory: Lungs clear to ascultation bilaterally. No wheezes, rales, or rhonchi.   Gastrointestinal: Positive bowel sounds. Soft. Non-tender. Non-distended. No guarding, rigidity, or rebound tenderness.  Extremities: No peripheral edema bilaterally.  Neurological: A+Ox3. CN 2-12 intact. No focal neurological deficits. Normal speech. No facial droop.  ---        Discharging Provider:  Sonja Mireles MD   Contact Info: [phone]      # Acute stroke  # Chronic atrial fibrillation  # DM2 with hyperglycemia  # HLD  # Hypothyroidism  # Lung nodule- outpatient follow up with pulmo.       HPI:  73 y/o male with past medical hx of A-fib on Eliquis, Type 2 DM, HLD, Hypothyroidism, HDL and CVA (2016) with minor residual symptoms. Patient presents to [facility] for lower leg weakness. Patient reports on Friday he felt a cramp in the left calf that prompt him to move out of the bed after that he is having trouble with his balance running into things and felt lightheaded.  Since then patient has feeling weak and having trouble to getting out of bed, standing up from seating position and unable to get dress. Patient called PCP Dr. Reyes how told him to go to the ED. Patient reports he experienced a similar episode in June this year and had a GI Ulcer.  He denies dizziness, blurry vision, fever, chills, headaches, chest pain, palpitations, N/D/V/C, Hematochezia, Melena, Urinary retention  or loss of sphincter control,  In the ED Vitals sign were /74, HR: 91, RR, 21, SpO2: 99%. He received NS 500cc Bolus, Non pertinent labs, Imaging Stroke protocol CTH, CT Head Angio , CT Neck:  No evidence of acute intracranial hemorrhage or midline shift. Chronic ischemic changes as discussed above. further evaluation. 1 to 2 mm vascular outpouching in the right ICA terminus, likely small vascular infundibulum versus small saccular aneurysm.  Atherosclerotic calcification and plaque in the bilateral carotid bulbs, resulting in moderate to severe luminal narrowing on the right external carotid artery and 0.7 cm nodule in the left lung apex. Admitted to the floors for further work up.         Hospital Course Summary: Patient was admitted for further work up to rule stroke. MRI head was ordered and found to haven Numerous acute right cerebral hemisphere infarctions. 2 tiny left frontal lobe infarcts. Neurologist was consulted and recommended outpatient extensive cardiac work up, concern about embolic disease secondary to atrial fibrillation. Patient will continue on Eliquis and Statins.  Cardiologist was consulted and recommended Metoprolol XL 25mg daily, continue with Eliquis and Electrophysiology follow up out patient. Patient needs to follow up Vascular outpatient for moderate to severe luminal narrowing on the right external carotid artery abd with pulmonologist for nodule in the left lung apex.       ---  VITALS:   Vital Signs Last 24 Hrs  T(C): 36.5 (25 Dec 2024 05:08), Max: 36.6 (24 Dec 2024 21:26)  T(F): 97.7 (25 Dec 2024 05:08), Max: 97.8 (24 Dec 2024 21:26)  HR: 88 (25 Dec 2024 05:08) (81 - 90)  BP: 144/81 (25 Dec 2024 05:08) (144/81 - 160/80)  BP(mean): --  RR: 16 (25 Dec 2024 05:08) (16 - 18)  SpO2: 98% (25 Dec 2024 05:08) (98% - 98%)    Parameters below as of 25 Dec 2024 05:08  Patient On (Oxygen Delivery Method): room air      ---  PHYSICAL EXAM:   General: Awake and alert, cooperative with exam. No acute distress.   Cardiology: Normal S1, S2. No murmurs. Regular rate and rhythm.   Respiratory: Lungs clear to ascultation bilaterally. No wheezes, rales, or rhonchi.   Gastrointestinal: Positive bowel sounds. Soft. Non-tender. Non-distended. No guarding, rigidity, or rebound tenderness.  Extremities: No peripheral edema bilaterally.  Neurological: A+Ox3. CN 2-12 intact. No focal neurological deficits. Normal speech. No facial droop.  ---        Discharging Provider:  Sonja Mireles MD   Contact Info: [phone]      # Acute stroke  # Chronic atrial fibrillation  # DM2 with hyperglycemia  # HLD  # Hypothyroidism  # Lung nodule- outpatient follow up with pulmo.       HPI:  75 y/o male with past medical hx of A-fib on Eliquis, Type 2 DM, HLD, Hypothyroidism, HDL and CVA (2016) with minor residual symptoms. Patient presents to [facility] for lower leg weakness. Patient reports on Friday he felt a cramp in the left calf that prompt him to move out of the bed after that he is having trouble with his balance running into things and felt lightheaded.  Since then patient has feeling weak and having trouble to getting out of bed, standing up from seating position and unable to get dress. Patient called PCP Dr. Reyes how told him to go to the ED. Patient reports he experienced a similar episode in June this year and had a GI Ulcer.  He denies dizziness, blurry vision, fever, chills, headaches, chest pain, palpitations, N/D/V/C, Hematochezia, Melena, Urinary retention  or loss of sphincter control,  In the ED Vitals sign were /74, HR: 91, RR, 21, SpO2: 99%. He received NS 500cc Bolus, Non pertinent labs, Imaging Stroke protocol CTH, CT Head Angio , CT Neck:  No evidence of acute intracranial hemorrhage or midline shift. Chronic ischemic changes as discussed above. further evaluation. 1 to 2 mm vascular outpouching in the right ICA terminus, likely small vascular infundibulum versus small saccular aneurysm.  Atherosclerotic calcification and plaque in the bilateral carotid bulbs, resulting in moderate to severe luminal narrowing on the right external carotid artery and 0.7 cm nodule in the left lung apex. Admitted to the floors for further work up.     Hospital Course Summary: Patient was admitted for lightheadedness and and lower leg weakness. On admission patient’s CT head showed no evidence of acute intracranial Hemorrhage or midline shift. He was noted to have chronic ischemic changes. CTA brain showed 1 to 2 mm vascular outpouching in the right ICA terminus, which was likely a small vascular infundibulum versus small saccular aneurysm. CTA neck showed atherosclerotic calcification and plaque in the bilateral carotid bulbs, resulting in moderate to severe luminal narrowing on the right. A carotid Doppler was recommended. Patient was also noted to have a 0.7 cm left lung [place] nodule. Patient’s MRI head showed numerous acute right cerebral hemisphere infarctions as well as two tiny left frontal lobe infractions. Ultrasound of the carotid artery showed no hemodynamically significant stenosis of right or left internal carotid arteries. Elevated velocity of left external carotid artery, reflected significant stenosis. MRI head was ordered and found to haven Numerous acute right cerebral hemisphere infarctions. 2 tiny left frontal lobe infarcts. Neurologist was consulted and recommended outpatient extensive cardiac work up, concern about embolic disease secondary to atrial fibrillation. Patient will continue on Eliquis and Statin.  Cardiologist was consulted and recommended Metoprolol XL 25mg daily, continue with Eliquis and Electrophysiology follow up out patient. Pt will need extended cardiac monitoring as well as follow up with his cardiologist Dr. Livingston to discuss further as well as with electrophysiologist.     Echo results    1. Left ventricular cavity is small.   2. Normal right ventricular cavity sizeTricuspid annular plane systolic   excursion (TAPSE) is 1.6 cm (normal >=1.7 cm).   3. Trace mitral regurgitation.   4. Non coronary cusp calcified.   5. Structurally normal pulmonic valve with normal leaflet excursion.   6. Structurally normal mitral valve with normal leaflet excursion.   7. Structurally normal tricuspid valve with normal leaflet excursion.   8. No evidence of aortic regurgitation.   9. No evidence of pulmonic regurgitation.  10. No left ventricular hypertrophy.  11. There is mild calcification of the aortic valve leaflets.      ---  VITALS:   Vital Signs Last 24 Hrs  T(C): 36.5 (25 Dec 2024 05:08), Max: 36.6 (24 Dec 2024 21:26)  T(F): 97.7 (25 Dec 2024 05:08), Max: 97.8 (24 Dec 2024 21:26)  HR: 88 (25 Dec 2024 05:08) (81 - 90)  BP: 144/81 (25 Dec 2024 05:08) (144/81 - 160/80)  BP(mean): --  RR: 16 (25 Dec 2024 05:08) (16 - 18)  SpO2: 98% (25 Dec 2024 05:08) (98% - 98%)    Parameters below as of 25 Dec 2024 05:08  Patient On (Oxygen Delivery Method): room air    ---  PHYSICAL EXAM:   General: Awake and alert, cooperative with exam. No acute distress.   Cardiology: Normal S1, S2. Irregular rate   Respiratory: Lungs clear to ascultation bilaterally. No wheezes, rales, or rhonchi.   Gastrointestinal: Positive bowel sounds. Soft. Non-tender. Non-distended. No guarding, rigidity, or rebound tenderness.  Extremities: No peripheral edema bilaterally.  Neurological: A+Ox3. CN 2-12 intact. No focal neurological deficits. Normal speech. No facial droop.  ---    Discharging Provider:  Sonja Mireles MD   Contact Info: [phone]    # Acute stroke  # Chronic atrial fibrillation  # DM2 with hyperglycemia  # HLD  # Hypothyroidism  # Lung nodule- outpatient follow up with pulmo.       HPI:  73 y/o male with past medical hx of A-fib on Eliquis, Type 2 DM, HLD, Hypothyroidism, HDL and CVA (2016) with minor residual symptoms. Patient presents to [facility] for lower leg weakness. Patient reports on Friday he felt a cramp in the left calf that prompt him to move out of the bed after that he is having trouble with his balance running into things and felt lightheaded.  Since then patient has feeling weak and having trouble to getting out of bed, standing up from seating position and unable to get dress. Patient called PCP Dr. Reyes how told him to go to the ED. Patient reports he experienced a similar episode in June this year and had a GI Ulcer.  He denies dizziness, blurry vision, fever, chills, headaches, chest pain, palpitations, N/D/V/C, Hematochezia, Melena, Urinary retention  or loss of sphincter control,  In the ED Vitals sign were /74, HR: 91, RR, 21, SpO2: 99%. He received NS 500cc Bolus, Non pertinent labs, Imaging Stroke protocol CTH, CT Head Angio , CT Neck:  No evidence of acute intracranial hemorrhage or midline shift. Chronic ischemic changes as discussed above. further evaluation. 1 to 2 mm vascular outpouching in the right ICA terminus, likely small vascular infundibulum versus small saccular aneurysm.  Atherosclerotic calcification and plaque in the bilateral carotid bulbs, resulting in moderate to severe luminal narrowing on the right external carotid artery and 0.7 cm nodule in the left lung apex. Admitted to the floors for further work up.     Hospital Course Summary: Patient was admitted for lightheadedness and and lower leg weakness. On admission patient’s CT head showed no evidence of acute intracranial Hemorrhage or midline shift. He was noted to have chronic ischemic changes. CTA brain showed 1 to 2 mm vascular outpouching in the right ICA terminus, which was likely a small vascular infundibulum versus small saccular aneurysm. CTA neck showed atherosclerotic calcification and plaque in the bilateral carotid bulbs, resulting in moderate to severe luminal narrowing on the right. A carotid Doppler was recommended. Patient was also noted to have a 0.7 cm left lung [place] nodule. Patient’s MRI head showed numerous acute right cerebral hemisphere infarctions as well as two tiny left frontal lobe infractions. Ultrasound of the carotid artery showed no hemodynamically significant stenosis of right or left internal carotid arteries. Elevated velocity of left external carotid artery, reflected significant stenosis. MRI head was ordered and found to haven Numerous acute right cerebral hemisphere infarctions. 2 tiny left frontal lobe infarcts. Neurologist was consulted and recommended outpatient extensive cardiac work up, concern about embolic disease secondary to atrial fibrillation. Patient will continue on Eliquis and Statin.  Cardiologist was consulted and recommended Metoprolol XL 25mg daily, continue with Eliquis and Electrophysiology follow up out patient. Pt will need extended cardiac monitoring as well as follow up with his cardiologist Dr. Livingston to discuss further as well as with electrophysiologist.     Echo results    1. Left ventricular cavity is small.   2. Normal right ventricular cavity size Tricuspid annular plane systolic   excursion (TAPSE) is 1.6 cm (normal >=1.7 cm).   3. Trace mitral regurgitation.   4. Non coronary cusp calcified.   5. Structurally normal pulmonic valve with normal leaflet excursion.   6. Structurally normal mitral valve with normal leaflet excursion.   7. Structurally normal tricuspid valve with normal leaflet excursion.   8. No evidence of aortic regurgitation.   9. No evidence of pulmonic regurgitation.  10. No left ventricular hypertrophy.  11. There is mild calcification of the aortic valve leaflets.      ---  VITALS:   Vital Signs Last 24 Hrs  T(C): 36.5 (25 Dec 2024 05:08), Max: 36.6 (24 Dec 2024 21:26)  T(F): 97.7 (25 Dec 2024 05:08), Max: 97.8 (24 Dec 2024 21:26)  HR: 88 (25 Dec 2024 05:08) (81 - 90)  BP: 144/81 (25 Dec 2024 05:08) (144/81 - 160/80)  BP(mean): --  RR: 16 (25 Dec 2024 05:08) (16 - 18)  SpO2: 98% (25 Dec 2024 05:08) (98% - 98%)    Parameters below as of 25 Dec 2024 05:08  Patient On (Oxygen Delivery Method): room air    ---  PHYSICAL EXAM:   General: Awake and alert, cooperative with exam. No acute distress.   Cardiology: Normal S1, S2. Irregular rate   Respiratory: Lungs clear to ascultation bilaterally. No wheezes, rales, or rhonchi.   Gastrointestinal: Positive bowel sounds. Soft. Non-tender. Non-distended. No guarding, rigidity, or rebound tenderness.  Extremities: No peripheral edema bilaterally.  Neurological: A+Ox3. CN 2-12 intact. No focal neurological deficits. Normal speech. No facial droop.  ---    Discharging Provider:  Sonja Mireles MD   Contact Info: [phone]    # Acute stroke  # Chronic atrial fibrillation  # DM2 with hyperglycemia  # HLD  # Hypothyroidism  # Lung nodule- outpatient follow up with pulmo.

## 2024-12-24 NOTE — CONSULT NOTE ADULT - SUBJECTIVE AND OBJECTIVE BOX
Patient is a 74 year old man admitted yesterday, 12/23/24. He states for the past few days prior to admission he noted he was leaning to either side on walking. He denies HA or other associated neurological complaints. He denies fever, chest pain, trauma. As the difficulty walking did not improve he came to the ED after discussion with his PMD.    PMH: As above           Atrial fibrillation-eliquis           DM           HLD           Hypothyroid           CVA    SH: No allergy    Exam: Awake, alert, appropriate           Pupils 2.5mm, EOM intact, no nystagmus, VFF            CN III- XII intact           Motor tone and strength  RUE   5/5   LUE   5/5                                                  RLE   5/5   LLE   5/5                          13.3   7.91  )-----------( 246      ( 24 Dec 2024 06:00 )             38.7     12-24    139  |  105  |  7   ----------------------------<  162[H]  3.9   |  26  |  1.06    Ca    9.2      24 Dec 2024 06:00    TPro  7.5  /  Alb  3.8  /  TBili  1.0  /  DBili  x   /  AST  35  /  ALT  41  /  AlkPhos  59  12-24    Troponin I, High Sensitivity Result: 8.9: Serial measurements of high sensitivity troponin I (hs TnI) showing rapid  upward or downward changes suggest acute myocardial injury.  Please note  that a sustained elevation of hsTnI can be caused by renal disease,  chronic heart failure, sepsis, pulmonary embolism and other clinical  conditions. ng/L (12.23.24 @ 11:30)      Lipid Profile in AM (12.24.24 @ 05:47)    LDL Cholesterol Calculated: 93: Optimal LDL cholesterol concentration vary based on ASCVD risk and  treatment goals.  All Patients: < 100 mg/dL  Patients at High Risk ASCVD: < 70 mg/dL  Patients at Very High Risk ASCVD: < 55 mg/dL  Consider Familial Hypercholesterolemia when LDL-C > 190 mg/dL.  The calculation for LDL cholesterol is based on the [place]/NIH equation  (JAKI Cardiol. 2020;5(5):540-548. doi:10.1001/jamacardio.2020.0013) mg/dL      < from: TTE Echo Complete w/o Contrast w/ Doppler (12.24.24 @ 11:12) >    CONCLUSIONS:     1. Left ventricular cavity is small.   2. Normal right ventricular cavity sizeTricuspid annular plane systolic   excursion (TAPSE) is 1.6 cm (normal >=1.7 cm).   3. Trace mitral regurgitation.   4. Non coronary cusp calcified.   5. Structurally normal pulmonic valve with normal leaflet excursion.   6. Structurally normal mitral valve with normal leaflet excursion.   7. Structurally normal tricuspid valve with normal leaflet excursion.   8. No evidence of aortic regurgitation.   9. No evidence of pulmonic regurgitation.  10. No left ventricular hypertrophy.  11. There is mild calcification of the aortic valve leaflets.      < from: CT Head No Cont (12.23.24 @ 13:10) >    COMPARISON: CT of the head with CTA head and neck March 20, 2016    FINDINGS:    CT HEAD:  No acute intracranial hemorrhage. Areas of decreased attenuation in the   deep and periventricular white matter, compatible with chronic small   vessel disease. Encephalomalacia/gliotic change in the right MCA vascular   distribution. Chronic infarct in the right cerebellar hemisphere. Chronic   appearing infarct in the right caudate head. Mild parenchymal volume   loss. No hydrocephalus. The extra-axial spaces and basal cisterns are   within normal limits. No midline shift or mass effect present.    The cranial cervical junction is within normal limits. The sella is not   expanded. No depressed calvarial fracture. The visualized paranasal   sinuses are well aerated. The visualized mastoid air cellsare well   aerated. The visualized orbits are within normal limits.    CTA BRAIN:  The intracranial segments of the bilateral ICAs opacify with intraluminal   contrast. 1 to 2 mm vascular outpouching at the right ICA terminus, image   268 of series [id number]. Atherosclerotic calcification in the intracranial   segments of the ICAs, resulting in mild to moderate luminal narrowing    Slightly ratty appearance to the distal left MCA branches, images 91   through 95 of series 606. Faint opacification of the inferior right MCA   vascular distribution. The bilateral MCAs and ACAs are otherwise within   normal limits. The anterior communicating artery is unremarkable.    The bilateral vertebral arteries opacify normally with intraluminal   contrast. The vertebral arteries are codominant. The basilar artery is   unremarkable. Mild narrowing of the right P1 segment. The proximal SCAs   and PCAs are otherwise within normal limits.    The dural-based sinuses opacify with contrast to the extent visualized.    CTA NECK:  There is a three-vessel arch. The common carotid arteries opacify   normally with intraluminal contrast. Atherosclerotic calcification and   plaque in the bilateral carotid bulbs, resulting in mild luminal   narrowing on the left and moderate to severe luminal narrowing on the   right. The bilateral ICAs opacify normally with intraluminal contrast to   the skull base.    The vertebral arteries have normal origins. The vertebral arteries are   codominant. The vertebral arteries opacify normally with intraluminal   contrast to the skull base.    The thyroid is unremarkable.    Mild degenerative changes in the cervical spine.    0.7 cm nodule in the left lung apex.      IMPRESSION:    CT HEAD:  1.  No evidence of acute intracranial hemorrhage or midline shift.  2.  Chronic ischemic changes as discussed above. If there is continued   concern for acute neurologic compromise, recommend MRI of the brain for   further evaluation.    CTA BRAIN:  1.  1 to 2 mm vascular outpouchingin the right ICA terminus, likely   small vascular infundibulum versus small saccular aneurysm.    CTA NECK:  1.  Atherosclerotic calcification and plaque in the bilateral carotid   bulbs, resulting in moderate to severe luminal narrowing on the right.   Recommend Doppler ultrasound for further evaluation of flow dynamics.  2.  0.7 cm nodule in the left lung apex. Recommend follow-up imaging as   per Fleischner criteria.    --- End of Report ---            THOM MONTGOMERY MD; Attending Radiologist  This document has been electronically signed. Dec 23 2024  1:41P      < from: MR Head No Cont (12.24.24 @ 09:01) >    FINDINGS:  Chronic moderate to large right frontal lobe infarction with cystic   encephalomalacia and gliosis.  There multiple foci of abnormal restricted diffusion involving the right   caudate head, right corpus callosum genu, right frontal lobe, right   parietal lobe, right occipital lobe, right temporal lobe compatible with   acute infarctions. Acutetiny left frontal lobe cortical infarcts image   27 of series 2. Chronic small right cerebellar infarct image 7 of series   6.. Scattered periventricular and subcortical white matter T2 /FLAIR   hyperintensities are seen without mass effect, nonspecific, likely   representing moderate to severe chronic microvascular changes. . Normal   T2 flow-voids are seen within  the intracranial vasculature. The lateral   ventricles and cortical sulci are age-appropriate in size and   configuration. There is no mass, mass effect, or extra-axial fluid   collection. There is no susceptibility artifact to suggest hemorrhage.   Midline structures are normal.  The visualized paranasal sinuses, mastoid   air cells and orbits are unremarkable.      IMPRESSION: Numerous acute right cerebral hemisphere infarctions. 2 tiny   left frontal lobe infarcts.    --- End of Report ---            IAN PETERS MD; Attending Radiologist      This document has been electronically signed. Dec 24 2024  9:14AM    < from: US Duplex Carotid Arteries Complete, Bilateral (12.23.24 @ 19:02) >    IMPRESSION:    Bilateral carotid artery plaque  -No hemodynamically significant stenosis right or left internal carotid   artery stenosis.  -Elevated velocity of the left external carotid artery reflects   significant stenosis.  -Follow-up carotid duplex ultrasound is recommended in 6 months for   reevaluation.    Heart rate is intermittently irregular. Correlate for arrhythmia.    Measurement of carotid stenosis is based on updated recommendations for   carotid stenosis interpretation criteria from the Intersocietal   Accreditation Commission (IAC) Vascular Testing Board, modified from the   Society of Radiologists in Ultrasound (SRU) Consensus Conference Criteria   for Internal Carotid Artery Stenosis.    --- End of Report ---            RIOS TODD M.D., ATTENDING RADIOLOGIST  This document has been electronically signed. Dec 23 2024  9:12PM    < end of copied text >              
74yM was admitted on 12-23    CC: Patient is a 74y old  Male who presents with a chief complaint of B/L Leg weakness (24 Dec 2024 08:30)    HPI:  75 y/o male with past medical hx of A-fib on Eliquis, Type 2 DM, HLD, Hypothyroidism, HDL and CVA (2016) with minor residual symptoms. Patient presents to [facility] for lower leg weakness. Patient reports on Friday he felt a cramp in the left calf that prompt him to move out of the bed after that he is having trouble with his balance running into things and felt lightheaded.  Since then patient has feeling weak and having trouble to getting out of bed, standing up from seating position and unable to get dress. Patient called PCP Dr. Reyes how told him to go to the ED. Patient reports he experienced a similar episode in June this year and had a GI Ulcer.  He denies dizziness, blurry vision, fever, chills, headaches, chest pain, palpitations, N/D/V/C, Hematochezia, Melena, Urinary retention  or loss of sphincter control,  In the ED Vitals sign were /74, HR: 91, RR, 21, SpO2: 99%. He received NS 500cc Bolus, Non pertinent labs, Imaging Stroke protocol CTH, CT Head Angio , CT Neck:  No evidence of acute intracranial hemorrhage or midline shift. Chronic ischemic changes as discussed above. further evaluation. 1 to 2 mm vascular outpouching in the right ICA terminus, likely small vascular infundibulum versus small saccular aneurysm.  Atherosclerotic calcification and plaque in the bilateral carotid bulbs, resulting in moderate to severe luminal narrowing on the right. 0.7 cm nodule in the left lung apex. Admitted to the floors for further work up. Patient found to have numerous acute right cerebral hemisphere infarctions and tiny left frontal lobe infarcts. PM&R consulted for rehab evaluation. Upon evaluation, patient is sitting in the chair at bedside and his wife is present. Patient and wife Suzie are adamant about being discharged from the hospital today, express they absolutely do not want any rehab. Patient observed to ambulate independently without an assistive device to and from the bathroom.       Imaging performed:  MRI Brain 12/24- Chronic moderate to large right frontal lobe infarction with cystic   encephalomalacia and gliosis.  There multiple foci of abnormal restricted diffusion involving the right   caudate head, right corpus callosum genu, right frontal lobe, right   parietal lobe, right occipital lobe, right temporal lobe compatible with   acute infarctions. Acute tiny left frontal lobe cortical infarcts image   27 of series 2. Chronic small right cerebellar infarct image 7 of series   6.. Scattered periventricular and subcortical white matter T2 /FLAIR   hyperintensities are seen without mass effect, nonspecific, likely   representing moderate to severe chronic microvascular changes. . Normal   T2 flow-voids are seen within  the intracranial vasculature. The lateral   ventricles and cortical sulci are age-appropriate in size and   configuration. There is no mass, mass effect, or extra-axial fluid   collection. There is no susceptibility artifact to suggest hemorrhage.   Midline structures are normal.  The visualized paranasal sinuses, mastoid   air cells and orbits are unremarkable.    REVIEW OF SYSTEMS  Constitutional - No fever, No weight loss, +fatigue  HEENT - No eye pain, No visual disturbances, No difficulty hearing, No tinnitus, No vertigo, No neck pain  Respiratory - No cough, No wheezing, No shortness of breath  Cardiovascular - No chest pain, No palpitations  Gastrointestinal - No abdominal pain, No nausea, No vomiting, No diarrhea, No constipation  Genitourinary - No dysuria, No frequency, No hematuria, No incontinence  Neurological - No headaches, No memory loss, No loss of strength, No numbness, No tremors  Skin - No itching, No rashes, No lesions   Endocrine - No temperature intolerance  Musculoskeletal - No joint pain  Psychiatric - No depression, No anxiety    VITALS  T(C): 36.4 (12-24-24 @ 12:58), Max: 37.4 (12-23-24 @ 20:19)  HR: 90 (12-24-24 @ 12:58) (90 - 120)  BP: 160/80 (12-24-24 @ 12:58) (158/91 - 165/83)  RR: 18 (12-24-24 @ 12:58) (18 - 18)  SpO2: 98% (12-24-24 @ 12:58) (95% - 98%)  Wt(kg): --    PAST MEDICAL & SURGICAL HISTORY  GERD (gastroesophageal reflux disease)    Type 2 diabetes mellitus    Hyperlipidemia    Cerebrovascular accident (CVA)    Adhesive capsulitis of right shoulder    H/O pilonidal cyst        FUNCTIONAL HISTORY  Lives with wife in private house, 3 steps to enter, 1 flight up to bed/bath  Independent without an AD    CURRENT FUNCTIONAL STATUS  12/24 PT    Gait Skills:     · Level of [place]	minimum assist (75% patients effort)  · Physical Assist/Nonphysical Assist	1 person assist  · Weight-Bearing Restrictions	full weight-bearing  · Assistive Device	rolling walker  · Gait Distance	50 feet    Gait Analysis:     · Gait Pattern Used	3-point gait  · Impairments Contributing to Gait Deviations	impaired balance    Balance Skills Assessment:     · Sitting Balance: Static	good balance  · Sitting Balance: Dynamic	good balance  · Standing Balance: Static	fair balance  · Standing Balance: Dynamic	fair minus  · Systems Impairment Contributing to Balance Disturbance	neuromuscular      SOCIAL HISTORY - as per documentation/history  Smoking - None    FAMILY HISTORY   Family hx of lung cancer (Father, Mother, Sibling)        RECENT LABS - Reviewed  CBC Full  -  ( 24 Dec 2024 06:00 )  WBC Count : 7.91 K/uL  RBC Count : 4.22 M/uL  Hemoglobin : 13.3 g/dL  Hematocrit : 38.7 %  Platelet Count - Automated : 246 K/uL  Mean Cell Volume : 91.7 fl  Mean Cell Hemoglobin : 31.5 pg  Mean Cell Hemoglobin Concentration : 34.4 g/dL  Auto Neutrophil # : x  Auto Lymphocyte # : x  Auto Monocyte # : x  Auto Eosinophil # : x  Auto Basophil # : x  Auto Neutrophil % : x  Auto Lymphocyte % : x  Auto Monocyte % : x  Auto Eosinophil % : x  Auto Basophil % : x    12-24    139  |  105  |  7   ----------------------------<  162[H]  3.9   |  26  |  1.06    Ca    9.2      24 Dec 2024 06:00    TPro  7.5  /  Alb  3.8  /  TBili  1.0  /  DBili  x   /  AST  35  /  ALT  41  /  AlkPhos  59  12-24    Urinalysis Basic - ( 24 Dec 2024 06:00 )    Color: x / Appearance: x / SG: x / pH: x  Gluc: 162 mg/dL / Ketone: x  / Bili: x / Urobili: x   Blood: x / Protein: x / Nitrite: x   Leuk Esterase: x / RBC: x / WBC x   Sq Epi: x / Non Sq Epi: x / Bacteria: x        ALLERGIES  No Known Allergies      MEDICATIONS   acetaminophen     Tablet .. 650 milliGRAM(s) Oral every 6 hours PRN  aluminum hydroxide/magnesium hydroxide/simethicone Suspension 30 milliLiter(s) Oral every 4 hours PRN  apixaban 5 milliGRAM(s) Oral every 12 hours  aspirin  chewable 81 milliGRAM(s) Oral daily  atorvastatin 80 milliGRAM(s) Oral at bedtime  dextrose 5%. 1000 milliLiter(s) IV Continuous <Continuous>  dextrose 5%. 1000 milliLiter(s) IV Continuous <Continuous>  dextrose 50% Injectable 25 Gram(s) IV Push once  dextrose 50% Injectable 12.5 Gram(s) IV Push once  dextrose 50% Injectable 25 Gram(s) IV Push once  dextrose Oral Gel 15 Gram(s) Oral once PRN  glucagon  Injectable 1 milliGRAM(s) IntraMuscular once  insulin lispro (ADMELOG) corrective regimen sliding scale   SubCutaneous three times a day before meals  insulin lispro (ADMELOG) corrective regimen sliding scale   SubCutaneous at bedtime  levothyroxine 75 MICROGram(s) Oral daily  melatonin 3 milliGRAM(s) Oral at bedtime PRN  multivitamin 1 Tablet(s) Oral daily  ondansetron Injectable 4 milliGRAM(s) IV Push every 8 hours PRN  pantoprazole    Tablet 40 milliGRAM(s) Oral before breakfast      ----------------------------------------------------------------------------------------  PHYSICAL EXAM  Constitutional - NAD, Comfortable  HEENT - NCAT, EOMI  Neck - Supple, No limited ROM  Chest - Breathing comfortably, No wheezing  Cardiovascular - S1S2   Abdomen - Soft   Extremities - No calf tenderness   Neurologic Exam -                    Cognitive - AAO to self, place, year, situation     Communication - Fluent, No dysarthria     Motor -                    LEFT    UE - ShAB 5/5, EF 5/5, EE 5/5, WE 5/5,  5/5                    RIGHT UE - ShAB 5/5, EF 5/5, EE 5/5, WE 5/5,  5/5                    LEFT    LE - HF 5/5, KE 5/5, DF 5/5, PF 5/5                    RIGHT LE - HF 5/5, KE 5/5, DF 5/5, PF 5/5     Psychiatric - Mood stable, Affect WNL  ----------------------------------------------------------------------------------------  ASSESSMENT/PLAN  74 year old male admitted to medicine with weakness and unsteady gait, found to have multiple CVAs  Multiple CVA-Lipitor, MRI brain with acute right cerebral hemisphere infarctions and tiny left frontal lobe infarcts. Pending neurology consult  Hypothyroidism- Synthroid  Sleep- Melatonin PRN  Pain - Tylenol  DVT PPX -Eliquis  Rehab - Patient is pending additional workup-- cardiology consult, Echo. Patient observed to ambulate independently without an assistive device to and from the bathroom. Both patient and his wife are adamant about going home. As long as patient can do stairs without difficulty, expect patient to achieve functional goals for DC HOME with OUTPATIENT followup. Discussed with rehab team. 
Chief Complaint:   75 y/o male with past medical hx of A-fib on Eliquis, Type 2 DM, HLD, Hypothyroidism, HDL and CVA (2016) with minor residual symptoms. Patient presents to [facility] for lower leg weakness. Patient reports on Friday he felt a cramp in the left calf that prompt him to move out of the bed after that he is having trouble with his balance running into things and felt lightheaded.  Since then patient has feeling weak and having trouble to getting out of bed, standing up from seating position and unable to get dress. Patient called PCP Dr. Reyes how told him to go to the ED. Patient reports he experienced a similar episode in  this year and had a GI Ulcer.  He denies dizziness, blurry vision, fever, chills, headaches, chest pain, palpitations, N/D/V/C, Hematochezia, Melena, Urinary retention  or loss of sphincter control,  In the ED Vitals sign were /74, HR: 91, RR, 21, SpO2: 99%. He received NS 500cc Bolus, Non pertinent labs, Imaging Stroke protocol CTH, CT Head Angio , CT Neck:  No evidence of acute intracranial hemorrhage or midline shift. Chronic ischemic changes as discussed above. further evaluation. 1 to 2 mm vascular outpouching in the right ICA terminus, likely small vascular infundibulum versus small saccular aneurysm.  Atherosclerotic calcification and plaque in the bilateral carotid bulbs, resulting in moderate to severe luminal narrowing on the right. 0.7 cm nodule in the left lung apex. Admitted to the floors for further work up.     HPI:    PMH:   GERD (gastroesophageal reflux disease)    Type 2 diabetes mellitus    Hyperlipidemia    Cerebrovascular accident (CVA)    Adhesive capsulitis of right shoulder      PSH:   H/O pilonidal cyst      Family History:  FAMILY HISTORY:  Family hx of lung cancer (Father, Mother, Sibling)        Social History:  Smoking:  Alcohol:  Drugs:    Allergies:  No Known Allergies      Medications:  acetaminophen     Tablet .. 650 milliGRAM(s) Oral every 6 hours PRN  aluminum hydroxide/magnesium hydroxide/simethicone Suspension 30 milliLiter(s) Oral every 4 hours PRN  apixaban 5 milliGRAM(s) Oral every 12 hours  atorvastatin 80 milliGRAM(s) Oral at bedtime  dextrose 5%. 1000 milliLiter(s) IV Continuous <Continuous>  dextrose 5%. 1000 milliLiter(s) IV Continuous <Continuous>  dextrose 50% Injectable 25 Gram(s) IV Push once  dextrose 50% Injectable 12.5 Gram(s) IV Push once  dextrose 50% Injectable 25 Gram(s) IV Push once  dextrose Oral Gel 15 Gram(s) Oral once PRN  glucagon  Injectable 1 milliGRAM(s) IntraMuscular once  insulin lispro (ADMELOG) corrective regimen sliding scale   SubCutaneous three times a day before meals  insulin lispro (ADMELOG) corrective regimen sliding scale   SubCutaneous at bedtime  levothyroxine 75 MICROGram(s) Oral daily  melatonin 3 milliGRAM(s) Oral at bedtime PRN  multivitamin 1 Tablet(s) Oral daily  ondansetron Injectable 4 milliGRAM(s) IV Push every 8 hours PRN  pantoprazole    Tablet 40 milliGRAM(s) Oral before breakfast      REVIEW OF SYSTEMS:  CONSTITUTIONAL: No fever, weight loss, or fatigue  EYES: No eye pain, visual disturbances, or discharge  ENMT:  No difficulty hearing, tinnitus, vertigo; No sinus or throat pain  NECK: No pain or stiffness  BREASTS: No pain, masses, or nipple discharge  RESPIRATORY: No cough, wheezing, chills or hemoptysis; No shortness of breath  CARDIOVASCULAR: No chest pain, palpitations, dizziness, or leg swelling  GASTROINTESTINAL: No abdominal or epigastric pain. No nausea, vomiting, or hematemesis; No diarrhea or constipation. No melena or hematochezia.  GENITOURINARY: No dysuria, frequency, hematuria, or incontinence  NEUROLOGICAL: No headaches, memory loss, loss of strength, numbness, or tremors  SKIN: No itching, burning, rashes, or lesions   LYMPH NODES: No enlarged glands  ENDOCRINE: No heat or cold intolerance; No hair loss  MUSCULOSKELETAL: No joint pain or swelling; No muscle, back, or extremity pain  PSYCHIATRIC: No depression, anxiety, mood swings, or difficulty sleeping  HEME/LYMPH: No easy bruising, or bleeding gums  ALLERY AND IMMUNOLOGIC: No hives or eczema    Physical Exam:  T(C): 36.4 (24 @ 12:58), Max: 36.4 (24 @ 05:31)  HR: 90 (24 @ 12:58) (90 - 90)  BP: 160/80 (24 @ 12:58) (160/80 - 165/83)  RR: 18 (24 @ 12:58) (18 - 18)  SpO2: 98% (24 @ 12:58) (98% - 98%)  Wt(kg): --    GENERAL: NAD, well-groomed, well-developed  HEAD:  Atraumatic, Normocephalic  EYES: EOMI, conjunctiva and sclera clear  ENT: Moist mucous membranes,  NECK: Supple, No JVD, no bruits  CHEST/LUNG: Clear to percussion bilaterally; No rales, rhonchi, wheezing, or rubs  HEART: Regular rate and rhythm; No murmurs, rubs, or gallops PMI non displaced.  ABDOMEN: Soft, Nontender, Nondistended; Bowel sounds present  EXTREMITIES:  2+ Peripheral Pulses, No clubbing, cyanosis, or edema  SKIN: No rashes or lesions  NERVOUS SYSTEM:  Cranial Nerves II-XII intact     Cardiovascular Diagnostic Testing:  ECG:    < from: 12 Lead ECG (24 @ 11:15) >  Diagnosis Line Atrial fibrillation with a competing junctional pacemaker  Abnormal ECG  When compared with ECG of 19-MAR-2016 12:10,  Atrial fibrillation has replaced Sinus rhythm    Confirmed by IZABELA QUIROZ MD () on 2024 7:49:04 AM    < end of copied text >      ECHO:      < from: TTE Echo Complete w/o Contrast w/ Doppler (24 @ 11:12) >  CONCLUSIONS:     1. Left ventricular cavity is small.   2. Normal right ventricular cavity sizeTricuspid annular plane systolic   excursion (TAPSE) is 1.6 cm (normal >=1.7 cm).   3. Trace mitral regurgitation.   4. Non coronary cusp calcified.   5. Structurally normal pulmonic valve with normal leaflet excursion.   6. Structurally normal mitral valve with normal leaflet excursion.   7. Structurally normal tricuspid valve with normal leaflet excursion.   8. No evidence of aortic regurgitation.   9. No evidence of pulmonic regurgitation.  10. No left ventricular hypertrophy.  11. There is mild calcification of the aortic valve leaflets.    ___________________________________________________________________________  _____________  FINDINGS:    Left Ventricle:  The left ventricular cavity is small. No left ventricular hypertrophy.  LV Wall Scoring: All segments are normal.        Right Ventricle:  The right ventricular cavity is normal in size. Tricuspid annular plane   systolic excursion (TAPSE) is 1.6 cm (normal >=1.7 cm). Tricuspid annular   tissue Doppler S' is 15.9 cm/s (normal >10 cm/s).    Aortic Valve:  The aortic valve appears trileaflet. There is mild calcification of the   aortic valve leaflets. There is moderate thickening of the aortic valve   leaflets. Non coronary cusp calcified. There is no evidence of aortic   regurgitation.    Mitral Valve:  Structurally normal mitral valve with normal leaflet excursion. There is   trace mitral regurgitation.    TricuspidValve:  Structurally normal tricuspid valve with normal leaflet excursion. There   is trace tricuspid regurgitation.    Pulmonic Valve:  Structurally normal pulmonic valve with normal leaflet excursion. There   is no evidence of pulmonic regurgitation.    Aorta:  The aortic root and ascending aorta appear normal in size.  ____________________________________________________________________  QUANTITATIVE DATA:  Left Ventricle Measurements: (Indexed to BSA)    IVSd (2D):   1.4 cm  LVPWd (2D):  1.4 cm  LVIDd (2D):  4.1 cm  LVIDs (2D):  2.6 cm  LV Mass:     223 g  112.6 g/m²  LV Vol d, MOD A2C: 36.4 ml 18.38 ml/m²  LV Vol d, MOD A4C: 31.7 ml 16.01 ml/m²  LV Vol d, MOD BP:  34.5 ml 17.42 ml/m²  LV Vol s, MOD A2C: 15.8 ml 7.98 ml/m²  LV Vol s, MOD A4C: 11.7 ml 5.91 ml/m²  LV Vol s, MOD BP:  14.5 ml 7.30 ml/m²  LVOT SV MOD BP:    20.0 ml  LV EF% MOD BP:     58 %    MV E Vmax:    0.95 m/s  MV A Vmax:    0.31 m/s  MV E/A:       3.04  e' lateral:   10.70 cm/s  e' medial:    11.40 cm/s  E/e' lateral: 8.92  E/e' medial:  8.37  E/e' Average: 8.63  MV DT:        195 msec    Aorta Measurements: (Normal range) (Indexed to BSA)    Ao Root d     2.60 cm (3.1 - 3.7 cm) 1.31 cm/m²  Ao Asc d, 2D: 2.70          Left Atrium Measurements: (Indexed to BSA)  LA Diam 2D: 3.00 cm        Right Ventricle Measurements: Right Atrial Measurements:    TAPSE:      1.6 cm            RA Vol s, MOD A4C  32.9 ml  RV S' Vmax: 15.90 cm/s        RA Area s, MOD A4C 14.8 cm²      LVOT / RVOT/ Qp/Qs Data: (Indexed to BSA)  LVOT Diameter:  1.80 cm  LVOT Area:      2.54 cm²  LVOT Vmax:      1.14 m/s  LVOT Vmn:       0.723 m/s  LVOT VTI:       21.30 cm  LVOT peak grad: 5 mmHg  LVOT mean grad: 2.5 mmHg  LVOT SV:        54.2 ml   27.37 ml/m²    Aortic Valve Measurements:  AV Vmax:                1.7 m/s  AV Peak Gradient:       12.0 mmHg  AV Mean Gradient:       6.5 mmHg  AV VTI:                 36.0 cm  AV VTI Ratio:           0.59  AoV EOA, Contin:        1.51 cm²  AoV EOA, Contin i:      0.76 cm²/m²  AoV Dimensionless Index 0.59    Mitral Valve Measurements:    MV E Vmax: 1.0 m/s         MR Vmax:          1.52 m/s  MV A Vmax: 0.3 m/s         MR Peak Gradient: 9.2 mmHg  MV E/A:    3.0      Tricuspid Valve Measurements:    TV S'             15.9 cm/s  TR Vmax:  2.3 m/s  TR Peak Gradient: 21.7 mmHg      Pulmonic Valve Measurments:  PV Vmax:          1.0 m/s  PV Peak Gradient: 4.2 mmHg    ___________________________________________________________________________  _____________  Electronically signed on 2024 at 2:39:36 PM by Izabela Quiroz        *** Final ***    < end of copied text >    Labs:                        13.3   7.91  )-----------( 246      ( 24 Dec 2024 06:00 )             38.7         137  |  103  |  14  ----------------------------<  233[H]  3.9   |  23  |  1.24    Ca    9.3      24 Dec 2024 18:24    TPro  7.5  /  Alb  3.8  /  TBili  1.0  /  DBili  x   /  AST  35  /  ALT  41  /  AlkPhos  59      PT/INR - ( 23 Dec 2024 11:30 )   PT: 14.8 sec;   INR: 1.26 ratio         PTT - ( 23 Dec 2024 11:30 )  PTT:32.6 sec        Total Cholesterol: 152  LDL: --  HDL: 33  T      Imaging:    Impression rule out TIA   now admitted with a charley horse and unstable neurologic signs and symptoms   I discussed with hospitalist team questions of compliance were discussed.  Will address with neurology after scanning.  Anticoagulation as indicated based upon history of CVA and atrial fibrillation with chadsvasc2 4 although recognize history of GI bleeding requiring packed cells with hemoglobin into the 7 8 range after few 4 units of packed cells bleeding ulcer treated with bipolar cautery   currently under care of Dr. Yaakov retana hospitalist team

## 2024-12-24 NOTE — CONSULT NOTE ADULT - ASSESSMENT
Patient is a 74 year old man admitted yesterday, 12/23/24. He states for the past few days prior to admission he noted he was leaning to either side on walking. He denies HA or other associated neurological complaints. He denies fever, chest pain, trauma. As the difficulty walking did not improve he came to the ED after discussion with his PMD. Neurological exam as above    1) CT Head as above no acute intracranial hemorrhage Right MCA encephalomalacia/gliotic change. Chronic infarct right cerebellum and right caudate.  2) CTA Head as above 1 to 2 mm outpouching right ICA terminus likely infundibulum versus small saccular aneurysm.  3) CTA Neck as above Moderate to severe narrowing right ICA. Recommend Doppler ultrasound evaluation flow dynamics. Nodule 0.7 cm left lung apex. Recommend follow=up imaging.  4) MRI Head as above  chronic moderate to large right frontal infarction with cystic encephalomalacia and gliosis. Multiple foci restricted diffusion right caudate, right corpus callosum, right frontal, right parietal, right occipital, right temporal compatible with acute infarctions. Acute 2 tiny left frontal infarcts. Chronic small right cerebellar infarcts.  5) Carotid Duplex as above no hemodynamically significant stenosis ICAs bilaterally  6) Echocardiogram as above no thrombus        With regard to the multiple acute infarcts in multiple vascular distributions, anterior and posterior and in bilateral hemispheres would be concerned with embolic disease secondary to atrial fibrillation. Consideration that included in the acute infarcts are also small infarcts due to small vessel disease.    1) Continue eliquis which the patient has been taking with regard to embolic risk from atrial fibrillation as will provide stroke prophylaxis. Would not place on an antiplatelet agent in addition to eliquis for stroke prophylaxis.  2) Cardiology consult with regard to rate control in addition to eliquis as regards atrial fibrillation. Also, outpatient extended cardiac monitoring with external cardiac monitor or ILR to monitor if variable rate of atrial fibrillation and other arrythmias.  3) Outpatient F/U with Pulmonary nodule left lung apex noted on CTA neck.  4) . Goal is less than 100. Preferred less than 70

## 2024-12-24 NOTE — DISCHARGE NOTE NURSING/CASE MANAGEMENT/SOCIAL WORK - FINANCIAL ASSISTANCE
[facility] provides services at a reduced cost to those who are determined to be eligible through [facility]’s financial assistance program. Information regarding [facility]’s financial assistance program can be found by going to [email or web address] or by calling [phone].

## 2024-12-24 NOTE — DISCHARGE NOTE NURSING/CASE MANAGEMENT/SOCIAL WORK - PATIENT PORTAL LINK FT
You can access the FollowMyHealth Patient Portal offered by [facility] by registering at the following website: [email or web address]. By joining [employer]’s FollowMyHealth portal, you will also be able to view your health information using other applications (apps) compatible with our system.

## 2024-12-24 NOTE — DISCHARGE NOTE NURSING/CASE MANAGEMENT/SOCIAL WORK - NSDCPEELIQUIS_GEN_ALL_CORE
normal mood with appropriate affect
Apixaban/Eliquis - Compliance/Apixaban/Eliquis - Dietary Advice/Apixaban/Eliquis - Follow up monitoring/Apixaban/Eliquis - Potential for adverse drug reactions and interactions

## 2024-12-24 NOTE — DISCHARGE NOTE PROVIDER - PROVIDER TOKENS
PROVIDER:[TOKEN:[[id number]],FOLLOWUP:[2 weeks]] PROVIDER:[TOKEN:[[id number]],FOLLOWUP:[2 weeks]],PROVIDER:[TOKEN:[[id number]],FOLLOWUP:[2 weeks]],PROVIDER:[TOKEN:[[id number]],FOLLOWUP:[1 week]],PROVIDER:[TOKEN:[[id number]],FOLLOWUP:[1 week]] PROVIDER:[TOKEN:[[id number]],FOLLOWUP:[2 weeks]],PROVIDER:[TOKEN:[[id number]],FOLLOWUP:[2 weeks]],PROVIDER:[TOKEN:[[id number]],FOLLOWUP:[1 week]],PROVIDER:[TOKEN:[[id number]],FOLLOWUP:[1 week]],PROVIDER:[TOKEN:[[id number]],FOLLOWUP:[1 week]] PROVIDER:[TOKEN:[[id number]],FOLLOWUP:[2 weeks]],PROVIDER:[TOKEN:[[id number]],FOLLOWUP:[2 weeks]],PROVIDER:[TOKEN:[[id number]],FOLLOWUP:[1 week]],PROVIDER:[TOKEN:[[id number]],FOLLOWUP:[1 week]],PROVIDER:[TOKEN:[[id number]]] PROVIDER:[TOKEN:[[id number]],FOLLOWUP:[2 weeks]],PROVIDER:[TOKEN:[[id number]],FOLLOWUP:[2 weeks]],PROVIDER:[TOKEN:[[id number]],FOLLOWUP:[1 week]],PROVIDER:[TOKEN:[[id number]],FOLLOWUP:[1 week]],PROVIDER:[TOKEN:[[id number]]],PROVIDER:[TOKEN:[[id number]]]

## 2024-12-24 NOTE — CHART NOTE - NSCHARTNOTEFT_GEN_A_CORE
Spoke over the phone with Dr. Lockwood since patient had CTA neck showed Atherosclerotic calcification and plaque in the bilateral carotid bulbs, resulting in moderate to severe luminal narrowing on the right. Recommend Doppler ultrasound for further evaluation of flow dynamic.     Carotid dopplers were obtained which showed -No hemodynamically significant stenosis right or left internal carotid   artery stenosis.-Elevated velocity of the left external carotid artery reflects significant stenosis.  -Follow-up carotid duplex ultrasound is recommended in 6 months for reevaluation.  Heart rate is intermittently irregular. Correlate for arrhythmia.      Spoke with Dr. Lockwood, vascular surgery, over the phone --- since patient did not have significant IC stenosis on US, it is not an appropriate consult.

## 2024-12-24 NOTE — DISCHARGE NOTE PROVIDER - NSDCCAREPROVSEEN_GEN_ALL_CORE_FT
Chi, Sonja Joshua, Senia Avery, Tyron Garcia, Corrina Jarvis, Guero Simpson, Remedios Watson, Girma Ray, Darlyn Drake, Kaila Lema, Breanna Lockwood, [place]

## 2024-12-24 NOTE — PROGRESS NOTE ADULT - SUBJECTIVE AND OBJECTIVE BOX
INTERVAL HPI/ OVERNIGHT EVENTS        PHYSICAL EXAM:  Vital Signs Last 24 Hrs  T(C): 36.4 (24 Dec 2024 05:31), Max: 37.4 (23 Dec 2024 20:19)  T(F): 97.5 (24 Dec 2024 05:31), Max: 99.4 (23 Dec 2024 20:19)  HR: 90 (24 Dec 2024 05:31) (72 - 120)  BP: 165/83 (24 Dec 2024 05:31) (148/74 - 174/98)  BP(mean): 111 (24 Dec 2024 05:31) (111 - 111)  RR: 18 (24 Dec 2024 05:31) (17 - 21)  SpO2: 98% (24 Dec 2024 05:31) (95% - 99%)    Parameters below as of 24 Dec 2024 05:31  Patient On (Oxygen Delivery Method): room air        Constitutional: Pt lying in bed, awake and alert, NAD  HEENT: EOMI, normal hearing, moist mucous membranes  Neck: Soft and supple, no JVD  Respiratory: CTABL, No wheezing, rales or rhonchi  Cardiovascular: S1S2+, RRR, no M/G/R  Gastrointestinal: BS+, soft, NT/ND, no guarding, no rebound  Extremities: No peripheral edema  Vascular: 2+ peripheral pulses  Neurological: AAOx3, no focal deficits  Musculoskeletal: 5/5 strength b/l upper and lower extremities  Skin: No rashes    MEDICATIONS:  MEDICATIONS  (STANDING):  apixaban 5 milliGRAM(s) Oral every 12 hours  aspirin  chewable 81 milliGRAM(s) Oral daily  atorvastatin 80 milliGRAM(s) Oral at bedtime  dextrose 5%. 1000 milliLiter(s) (100 mL/Hr) IV Continuous <Continuous>  dextrose 5%. 1000 milliLiter(s) (50 mL/Hr) IV Continuous <Continuous>  dextrose 50% Injectable 25 Gram(s) IV Push once  dextrose 50% Injectable 12.5 Gram(s) IV Push once  dextrose 50% Injectable 25 Gram(s) IV Push once  glucagon  Injectable 1 milliGRAM(s) IntraMuscular once  insulin lispro (ADMELOG) corrective regimen sliding scale   SubCutaneous three times a day before meals  insulin lispro (ADMELOG) corrective regimen sliding scale   SubCutaneous at bedtime  levothyroxine 75 MICROGram(s) Oral daily  multivitamin 1 Tablet(s) Oral daily  pantoprazole    Tablet 40 milliGRAM(s) Oral before breakfast      LABS: All Labs Reviewed:                        13.3   7.91  )-----------( 246      ( 24 Dec 2024 06:00 )             38.7     12-24    139  |  105  |  7   ----------------------------<  162[H]  3.9   |  26  |  1.06    Ca    9.2      24 Dec 2024 06:00    TPro  7.5  /  Alb  3.8  /  TBili  1.0  /  DBili  x   /  AST  35  /  ALT  41  /  AlkPhos  59  12-24    PT/INR - ( 23 Dec 2024 11:30 )   PT: 14.8 sec;   INR: 1.26 ratio         PTT - ( 23 Dec 2024 11:30 )  PTT:32.6 sec      Blood Culture:   I&O's Summary    CAPILLARY BLOOD GLUCOSE      POCT Blood Glucose.: 160 mg/dL (24 Dec 2024 08:18)  POCT Blood Glucose.: 195 mg/dL (23 Dec 2024 21:10)  POCT Blood Glucose.: 149 mg/dL (23 Dec 2024 19:29)  POCT Blood Glucose.: 196 mg/dL (23 Dec 2024 11:30)      RADIOLOGY/EKG:   INTERVAL HPI/ OVERNIGHT EVENTS: No overnight events,  Patient was seen and examined at bedside. No acute complains        PHYSICAL EXAM:  Vital Signs Last 24 Hrs  T(C): 36.4 (24 Dec 2024 05:31), Max: 37.4 (23 Dec 2024 20:19)  T(F): 97.5 (24 Dec 2024 05:31), Max: 99.4 (23 Dec 2024 20:19)  HR: 90 (24 Dec 2024 05:31) (72 - 120)  BP: 165/83 (24 Dec 2024 05:31) (148/74 - 174/98)  BP(mean): 111 (24 Dec 2024 05:31) (111 - 111)  RR: 18 (24 Dec 2024 05:31) (17 - 21)  SpO2: 98% (24 Dec 2024 05:31) (95% - 99%)    Parameters below as of 24 Dec 2024 05:31  Patient On (Oxygen Delivery Method): room air        Constitutional: Pt lying in bed, awake and alert, NAD  HEENT: EOMI, normal hearing, moist mucous membranes  Neck: Soft and supple, no JVD  Respiratory: CTABL, No wheezing, rales or rhonchi  Cardiovascular: S1S2+, RRR, no M/G/R  Gastrointestinal: BS+, soft, NT/ND, no guarding, no rebound  Extremities: No peripheral edema  Vascular: 2+ peripheral pulses  Neurological: AAOx3, b/l leg weakness   Skin: No rashes    MEDICATIONS:  MEDICATIONS  (STANDING):  apixaban 5 milliGRAM(s) Oral every 12 hours  aspirin  chewable 81 milliGRAM(s) Oral daily  atorvastatin 80 milliGRAM(s) Oral at bedtime  dextrose 5%. 1000 milliLiter(s) (100 mL/Hr) IV Continuous <Continuous>  dextrose 5%. 1000 milliLiter(s) (50 mL/Hr) IV Continuous <Continuous>  dextrose 50% Injectable 25 Gram(s) IV Push once  dextrose 50% Injectable 12.5 Gram(s) IV Push once  dextrose 50% Injectable 25 Gram(s) IV Push once  glucagon  Injectable 1 milliGRAM(s) IntraMuscular once  insulin lispro (ADMELOG) corrective regimen sliding scale   SubCutaneous three times a day before meals  insulin lispro (ADMELOG) corrective regimen sliding scale   SubCutaneous at bedtime  levothyroxine 75 MICROGram(s) Oral daily  multivitamin 1 Tablet(s) Oral daily  pantoprazole    Tablet 40 milliGRAM(s) Oral before breakfast      LABS: All Labs Reviewed:                        13.3   7.91  )-----------( 246      ( 24 Dec 2024 06:00 )             38.7     12-24    139  |  105  |  7   ----------------------------<  162[H]  3.9   |  26  |  1.06    Ca    9.2      24 Dec 2024 06:00    TPro  7.5  /  Alb  3.8  /  TBili  1.0  /  DBili  x   /  AST  35  /  ALT  41  /  AlkPhos  59  12-24    PT/INR - ( 23 Dec 2024 11:30 )   PT: 14.8 sec;   INR: 1.26 ratio         PTT - ( 23 Dec 2024 11:30 )  PTT:32.6 sec      Blood Culture:   I&O's Summary    CAPILLARY BLOOD GLUCOSE      POCT Blood Glucose.: 160 mg/dL (24 Dec 2024 08:18)  POCT Blood Glucose.: 195 mg/dL (23 Dec 2024 21:10)  POCT Blood Glucose.: 149 mg/dL (23 Dec 2024 19:29)  POCT Blood Glucose.: 196 mg/dL (23 Dec 2024 11:30)      RADIOLOGY/EKG:

## 2024-12-24 NOTE — DISCHARGE NOTE NURSING/CASE MANAGEMENT/SOCIAL WORK - NSDCPEELIQUISCOMP_GEN_ALL_CORE
History  Chief Complaint   Patient presents with   • Back Pain     Chronic low back pain; took tylenol at 260     78year old female with a PMHx chronic lumbar back pain, HTN, venous stasis presenting with low back pain and a wound on the right foot. The wound is located on the anterior portion of the foot toward the medial malleolus. The wound has been there for three days, is unchanged in size. She has been experiencing this for several years, the wound resolves and returns spontaneously. Reports needing IV abx and admission in the past. Has been doing wound care at home for supportive care. There is no fevers, pain, purulent drainage, swelling, erythema, lymphatic streaking. The back pain is located in her lumbar spine midline, where her chronic back pain typically is. No bowel/bladder incontinence, urinary retention, saddle paresthesia. Took Tylenol this morning without relief. No reason for exacerbation in back pain. Prior to Admission Medications   Prescriptions Last Dose Informant Patient Reported? Taking? amLODIPine-valsartan (EXFORGE)  MG per tablet   Yes Yes   Sig: Take 1 tablet by mouth daily   pantoprazole (PROTONIX) 20 mg tablet   Yes Yes   Sig: Take 20 mg by mouth daily      Facility-Administered Medications: None     Past Medical History:   Diagnosis Date   • Hypertension    • Stomach ulcer      Past Surgical History:   Procedure Laterality Date   • GALLBLADDER SURGERY     • HYSTERECTOMY     • VARICOSE VEIN SURGERY       History reviewed. No pertinent family history. I have reviewed and agree with the history as documented. E-Cigarette/Vaping     E-Cigarette/Vaping Substances     Social History     Tobacco Use   • Smoking status: Never   • Smokeless tobacco: Never   Substance Use Topics   • Alcohol use: Never   • Drug use: Never     Review of Systems   Constitutional: Negative for chills and fever. Eyes: Negative for photophobia and visual disturbance.    Respiratory: Negative for cough and shortness of breath. Cardiovascular: Positive for leg swelling. Negative for chest pain and palpitations. Gastrointestinal: Negative for abdominal pain, nausea and vomiting. Musculoskeletal: Positive for back pain. Negative for joint swelling, neck pain and neck stiffness. Skin: Positive for color change and wound. Neurological: Negative for light-headedness, numbness and headaches. Physical Exam  Physical Exam  Vitals reviewed. Constitutional:       General: She is not in acute distress. Appearance: Normal appearance. She is not ill-appearing, toxic-appearing or diaphoretic. HENT:      Head: Normocephalic and atraumatic. Right Ear: External ear normal.      Left Ear: External ear normal.      Nose: Nose normal.   Eyes:      General: No scleral icterus. Right eye: No discharge. Left eye: No discharge. Extraocular Movements: Extraocular movements intact. Conjunctiva/sclera: Conjunctivae normal.   Cardiovascular:      Rate and Rhythm: Normal rate and regular rhythm. Pulses: Normal pulses. Radial pulses are 2+ on the right side and 2+ on the left side. Pulmonary:      Effort: Pulmonary effort is normal.      Breath sounds: Normal breath sounds. Musculoskeletal:         General: Normal range of motion. Cervical back: Normal, normal range of motion and neck supple. No rigidity or tenderness. Thoracic back: Normal. No spasms, tenderness or bony tenderness. Lumbar back: Tenderness and bony tenderness present. Back:       Right lower leg: Edema present. Left lower leg: Edema present. Feet:      Right foot:      Skin integrity: Ulcer and skin breakdown present. Comments:   3 cm x 2 cm ulceration noted to the anterior foot, slightly below the medial malleolus. The wound was probed and does not appear to be deep to the bone but rather superficial tissue. No surrounding erythema or warmth.  Swelling to the bilateral lower extremities, however, this is chronic for the patient as she has venous stasis. Brown skin discoloration noted to the lower extremities as well consistent with venous stasis. No foreign body or purulent drainage. Consistent with ulcer rather than infection. Lymphadenopathy:      Cervical: No cervical adenopathy. Skin:     General: Skin is warm and dry. Capillary Refill: Capillary refill takes less than 2 seconds. Neurological:      General: No focal deficit present. Mental Status: She is alert. Psychiatric:         Mood and Affect: Mood normal.         Behavior: Behavior normal.             Vital Signs  ED Triage Vitals [09/16/23 1540]   Temperature Pulse Respirations Blood Pressure SpO2   98.1 °F (36.7 °C) 75 20 156/89 99 %      Temp Source Heart Rate Source Patient Position - Orthostatic VS BP Location FiO2 (%)   Oral Monitor Sitting Left arm --      Pain Score       --         Vitals:    09/16/23 1540   BP: 156/89   Pulse: 75   Patient Position - Orthostatic VS: Sitting     Visual Acuity    ED Medications  Medications   ketorolac (TORADOL) injection 15 mg (15 mg Intramuscular Given 9/16/23 1620)       Diagnostic Studies  Results Reviewed     None             No orders to display          Procedures  Procedures         ED Course  ED Course as of 09/16/23 2020   Sat Sep 16, 2023   1542 Blood Pressure: 156/89                               SBIRT 20yo+    Flowsheet Row Most Recent Value   Initial Alcohol Screen: US AUDIT-C     1. How often do you have a drink containing alcohol? 0 Filed at: 09/16/2023 1545   2. How many drinks containing alcohol do you have on a typical day you are drinking? 0 Filed at: 09/16/2023 1545   3a. Male UNDER 65: How often do you have five or more drinks on one occasion? 0 Filed at: 09/16/2023 1545   3b. FEMALE Any Age, or MALE 65+: How often do you have 4 or more drinks on one occassion?  0 Filed at: 09/16/2023 1545   Audit-C Score 0 Filed at: 09/16/2023 1545   BARBARA: How many times in the past year have you. .. Used an illegal drug or used a prescription medication for non-medical reasons? Never Filed at: 09/16/2023 1545                    Medical Decision Making  78year old female presenting with acute exacerbation of low back pain and a leg ulcer on the right foot. The leg ulcer has been a chronic problem because of chronic venous stasis but recently reappeared. Appearance is more consistent with an ulcer rather than an infection, but will cover with cephalosporin abx as a precaution. No lymphatic streaking, purulence, crepitus, or fevers. Patient has a PCP she will call for wound care. For the back pain, she opted for Toradol and Lidoderm pain patch for relief. Supportive care and wound care discussed. Pt stable at time of discharge, vital signs reviewed, questions answered. Strict ER return precautions provided/discussed and were well understood by patient. Venous stasis ulcer ([address]): acute illness or injury  Amount and/or Complexity of Data Reviewed  External Data Reviewed: labs and notes. Details: Reviewed notes for review of previous treatment of venous ulceration and current management. Risk  Prescription drug management. Disposition  Final diagnoses:   Venous stasis ulcer ([address])     Time reflects when diagnosis was documented in both MDM as applicable and the Disposition within this note     Time User Action Codes Description Comment    9/16/2023  4:17 PM Ximena Walls Add [I83.009,  L97.909] Venous stasis ulcer [facility])       ED Disposition     ED Disposition   Discharge    Condition   Stable    Date/Time   Sat Sep 16, 2023  4:16 PM    Comment   Angelica Farley discharge to home/self care.                Follow-up Information     Follow up With Specialties Details Why Contact Info Additional [address] [facility] Emergency Department Emergency Medicine Go to  If symptoms worsen [address] [facility] [address] [address] [place] [address]  [address] [facility] Emergency Department          Discharge Medication List as of 9/16/2023  4:19 PM      START taking these medications    Details   cefuroxime (CEFTIN) 500 mg tablet Take 1 tablet (500 mg total) by mouth every 12 (twelve) hours for 7 days, Starting Sat 9/16/2023, Until Sat 9/23/2023, Normal         CONTINUE these medications which have NOT CHANGED    Details   amLODIPine-valsartan (EXFORGE)  MG per tablet Take 1 tablet by mouth daily, Starting Fri 12/2/2022, Until Sat 12/2/2023, Historical Med      pantoprazole (PROTONIX) 20 mg tablet Take 20 mg by mouth daily, Starting Fri 12/2/2022, Until Sat 12/2/2023, Historical Med             No discharge procedures on file.     PDMP Review     None          ED Provider  Electronically Signed by           Maria Guadalupe Burnham PA-C  09/16/23 2020 Apixaban/Eliquis is used to treat and prevent blood clots. If you are not able to swallow the tablets whole, they may be crushed and mixed in water, apple juice, or applesauce and promptly taken within four hours. Never skip a dose of Apixaban/Eliquis. If you forget to take your Apixaban/Eliquis, take a dose as soon as you remember. If it is almost time for your next Apixaban/Eliquis dose, wait until then and take a regular dose. DO NOT take an extra pill to ‘catch up’.  NEVER TAKE A DOUBLE DOSE. Notify your doctor that you missed a dose. Take Apixaban/Eliquis at the same time each morning and evening. Apixaban/Eliquis may be taken with other medication or food.

## 2024-12-24 NOTE — DISCHARGE NOTE PROVIDER - NSDCMRMEDTOKEN_GEN_ALL_CORE_FT
atorvastatin 40 mg oral tablet: 1 tab(s) orally once a day (at bedtime)  Co-Q10 100 mg oral capsule: 1 cap(s) orally 2 times a day  Eliquis 5 mg oral tablet: 1 tab(s) orally 2 times a day  levothyroxine 75 mcg (0.075 mg) oral tablet: 1 tab(s) orally once a day  metFORMIN 1000 mg oral tablet, extended release: 1 tab(s) orally 2 times a day  Neuriva Brain performance Plus Therapeutic Multiple Vitamins oral capsule: 1 cap(s) orally once a day  omeprazole 20 mg oral delayed release tablet: 1 tab(s) orally once a day   atorvastatin 40 mg oral tablet: 1 tab(s) orally once a day (at bedtime)  Co-Q10 100 mg oral capsule: 1 cap(s) orally 2 times a day  Eliquis 5 mg oral tablet: 1 tab(s) orally 2 times a day  levothyroxine 75 mcg (0.075 mg) oral tablet: 1 tab(s) orally once a day  metFORMIN 1000 mg oral tablet, extended release: 1 tab(s) orally 2 times a day  metoprolol succinate 25 mg oral tablet, extended release: 1 tab(s) orally once a day  Neuriva Brain performance Plus Therapeutic Multiple Vitamins oral capsule: 1 cap(s) orally once a day  omeprazole 20 mg oral delayed release tablet: 1 tab(s) orally once a day

## 2024-12-24 NOTE — DISCHARGE NOTE PROVIDER - NSDCQMANTICOAG_GEN_A_CORE
No, not prescribed... Airway patent, Nasal mucosa clear. Mouth with normal mucosa. Throat has no vesicles, no oropharyngeal exudates and uvula is midline. Yes

## 2024-12-24 NOTE — DISCHARGE NOTE PROVIDER - CARE PROVIDERS DIRECT ADDRESSES
,DirectAddress_Unknown ,DirectAddress_Unknown,[email or web address],DirectAddress_Unknown ,DirectAddress_Unknown,[email or web address],DirectAddress_Unknown,[email or web address] ,[email or web address],[email or web address],DirectAddress_Unknown ,DirectAddress_Unknown,[email or web address],DirectAddress_Unknown,[email or web address]

## 2024-12-24 NOTE — DISCHARGE NOTE PROVIDER - ATTENDING DISCHARGE PHYSICAL EXAMINATION:
on exam aaox3, no apparent distress, ambulating independently, both lungs clear, s1, s2, regular, abdomen- soft, no pedal edema, strength appears equal in all extremities, no drift

## 2024-12-24 NOTE — DISCHARGE NOTE PROVIDER - NSDCFUSCHEDAPPT_GEN_ALL_CORE_FT
Kurtis Livingston  [facility]  CARDIOLOGY [address]  Scheduled Appointment: 12/30/2024    Reyes, John A  [facility]  INTMED [address]  Scheduled Appointment: 01/03/2025

## 2024-12-24 NOTE — PHYSICAL THERAPY INITIAL EVALUATION ADULT - PERTINENT HX OF CURRENT PROBLEM, REHAB EVAL
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 Patient presents to [facility] for lower leg weakness. Patient reports on Friday he felt a cramp in the left calf that prompt him to move out of the bed after that he is having trouble with his balance running into things and felt lightheaded.  Since then patient has feeling weak and having trouble to getting out of bed, standing up from seating position and unable to get dress. Patient called PCP Dr. Reyes how told him to go to the ED. Patient reports he experienced a similar episode in June this year and had a GI Ulcer.  He denies dizziness, blurry vision, fever, chills, headaches, chest pain, palpitations, N/D/V/C, Hematochezia, Melena, Urinary retention  or loss of sphincter control,  In the ED Vitals sign were /74, HR: 91, RR, 21, SpO2: 99%. He received NS 500cc Bolus, Non pertinent labs, Imaging Stroke protocol CTH, CT Head Angio , CT Neck:  No evidence of acute intracranial hemorrhage or midline shift. Chronic ischemic changes as discussed above. further evaluation. 1 to 2 mm vascular outpouching in the right ICA terminus, likely small vascular infundibulum versus small saccular aneurysm.  Atherosclerotic calcification and plaque in the bilateral carotid bulbs, resulting in moderate to severe luminal narrowing on the right. 0.7 cm nodule in the left lung apex. Admitted to the floors for further work up.

## 2024-12-24 NOTE — PHYSICAL THERAPY INITIAL EVALUATION ADULT - ADDITIONAL COMMENTS
pt lives w/spouse in private house, 4 rona w/1 rail, 1 flt to br w/bilat rails. pt independent w/ambulation and ADL's, +. pt has RW and transport chair from family member

## 2024-12-24 NOTE — DISCHARGE NOTE PROVIDER - CARE PROVIDER_API CALL
Senia Joshua  Neurology  [address] [place] [address]  Phone: [phone]  Fax: [phone]  Follow Up Time: 2 weeks   Senia Joshua  Neurology  [address] [place] [address]  Phone: [phone]  Fax: [phone]  Follow Up Time: 2 weeks    Reyes, John Anthony  Internal Medicine  [address] [place] [address]  Phone: [phone]  Fax: [phone]  Follow Up Time: 2 weeks    Kurtis Livingston  Cardiology  [address] [facility], [address]  [place] [address]  Phone: [phone]  Fax: [phone]  Follow Up Time: 1 week    Rober Pierre  Vascular Surgery  [address] [facility], [address]  [place] [address]  Phone: [phone]  Fax: [phone]  Follow Up Time: 1 week   Senia Joshua  Neurology  [address] [place] [address]  Phone: [phone]  Fax: [phone]  Follow Up Time: 2 weeks    Reyes, John Anthony  Internal Medicine  [address] [place] [address]  Phone: [phone]  Fax: [phone]  Follow Up Time: 2 weeks    Kurtis Livingston  Cardiology  [address] [facility], [address]  [place] [address]  Phone: [phone]  Fax: [phone]  Follow Up Time: 1 week    Rober Pierre  Vascular Surgery  [address] [facility], [address]  [place] [address]  Phone: [phone]  Fax: [phone]  Follow Up Time: 1 week    Van Wilhelm  Cardiac Electrophysiology  [address] [facility], [address]  [place] [address]  Phone: [phone]  Fax: [phone]  Follow Up Time: 1 week   Senia Joshua  Neurology  [address] [place] [address]  Phone: [phone]  Fax: [phone]  Follow Up Time: 2 weeks    Reyes, John Anthony  Internal Medicine  [address] [place] [address]  Phone: [phone]  Fax: [phone]  Follow Up Time: 2 weeks    Kurtis Livingston  Cardiology  [address] [facility], [address]  [place] [address]  Phone: [phone]  Fax: [phone]  Follow Up Time: 1 week    Van Wilhelm  Cardiac Electrophysiology  [address] [facility], [address]  [place] [address]  Phone: [phone]  Fax: [phone]  Follow Up Time: 1 week    Perico Lockwood  Vascular Surgery  [address] [facility], [address]  [place] [address]  Phone: [phone]  Fax: [phone]  Follow Up Time:    Senia Joshua  Neurology  [address] [place] [address]  Phone: [phone]  Fax: [phone]  Follow Up Time: 2 weeks    Reyes, John Anthony  Internal Medicine  [address] [place] [address]  Phone: [phone]  Fax: [phone]  Follow Up Time: 2 weeks    Kurtis Livingston  Cardiology  [address] [facility], [address]  [place] [address]  Phone: [phone]  Fax: [phone]  Follow Up Time: 1 week    Van Wilhelm  Cardiac Electrophysiology  [address] [facility], [address]  [place] [address]  Phone: [phone]  Fax: [phone]  Follow Up Time: 1 week    Perico Lockwood  Vascular Surgery  [address] [facility], [address]  [place] [address]  Phone: [phone]  Fax: [phone]  Follow Up Time:     Jevon Cho  Critical Care Medicine  [address] [facility], [address]  [place] [address]  Phone: [phone]  Fax: [phone]  Follow Up Time:

## 2024-12-24 NOTE — DISCHARGE NOTE NURSING/CASE MANAGEMENT/SOCIAL WORK - NSDCPEFALRISK_GEN_ALL_CORE
For information on Fall & Injury Prevention, visit: [email or web address] OR  [email or web address] OR  https://www.cdc.gov/steadi/patient.html

## 2024-12-24 NOTE — DISCHARGE NOTE PROVIDER - NSDCCPCAREPLAN_GEN_ALL_CORE_FT
PRINCIPAL DISCHARGE DIAGNOSIS  Diagnosis: Cerebellar stroke  Assessment and Plan of Treatment: You were admitted for bilateral lower leag weakness and unsteady gait. CT Head did no showed acute pathologies but further MRI showed Numerous acute right cerebral hemisphere infarctions and 2 tiny left frontal lobe infarcts.        SECONDARY DISCHARGE DIAGNOSES  Diagnosis: Unsteady gait  Assessment and Plan of Treatment:      PRINCIPAL DISCHARGE DIAGNOSIS  Diagnosis: Cerebellar stroke  Assessment and Plan of Treatment: You were admitted for bilateral lower leg weakness and unsteady gait. CT Head did no showed acute pathologies but further MRI showed Numerous acute right cerebral hemisphere infarctions and 2 tiny left frontal lobe infarcts.  You were treated with statin and anticoagulation and constant monitor of your hear rate.  Neurologist was consulted and recommended continue with medications and cardiologist consult.  Cardiologist was consulted and recommended ___  You need to follow up with your PCP, Neurologist for further management.        SECONDARY DISCHARGE DIAGNOSES  Diagnosis: External carotid artery stenosis  Assessment and Plan of Treatment: CTA of neck and Carotid duppler showed:  moderate to severe luminal narrowing on the right external coronary artery.  You need to follow up with your PCP and Vascular for further managament       Diagnosis: Lung nodule  Assessment and Plan of Treatment: CT Showed 0.7 cm nodule in the left lung apex.  You need to follow up with your PCP for further work up     PRINCIPAL DISCHARGE DIAGNOSIS  Diagnosis: Cerebellar stroke  Assessment and Plan of Treatment: You were admitted for bilateral lower leg weakness and unsteady gait. CT Head did no showed acute pathologies but further MRI showed Numerous acute right cerebral hemisphere infarctions and 2 tiny left frontal lobe infarcts.  You were treated with statin and anticoagulation and constant monitor of your hear rate.  Neurologist was consulted and recommended continue with medications and cardiologist consult.  Cardiologist was consulted and recommended to start Metoprolol 25mg daily, continue with eliquis ,  atorvastatatin and follow up with Electrophysiologist   You need to follow up with your PCP, Neurologist for further management.        SECONDARY DISCHARGE DIAGNOSES  Diagnosis: External carotid artery stenosis  Assessment and Plan of Treatment: CTA of neck and Carotid duppler showed:  moderate to severe luminal narrowing on the right external coronary artery.  You need to follow up with your PCP and Vascular for further managament       Diagnosis: Lung nodule  Assessment and Plan of Treatment: CT Showed 0.7 cm nodule in the left lung apex.  You need to follow up with your PCP for further work up     PRINCIPAL DISCHARGE DIAGNOSIS  Diagnosis: Cerebellar stroke  Assessment and Plan of Treatment: You were admitted for bilateral lower leg weakness and unsteady gait. CT Head did no showed acute pathologies but further MRI showed Numerous acute right cerebral hemisphere infarctions and 2 tiny left frontal lobe infarcts.  You were treated with statin and anticoagulation and constant monitor of your hear rate.  Neurologist was consulted and recommended continue with medications and cardiologist consult.  Cardiologist was consulted and recommended to start Metoprolol 25mg daily, continue with eliquis ,  atorvastatatin and follow up with Electrophysiologist   You need to follow up with your PCP, Neurologist for further management.  Return to the Emergency Department for worsening or persistent symptoms, and/or ANY NEW OR CONCERNING SYMPTOMS. If you have issues obtaining follow up, please call: [phone] or [phone]  to obtain a doctor or specialist who takes your insurance in your area.        SECONDARY DISCHARGE DIAGNOSES  Diagnosis: External carotid artery stenosis  Assessment and Plan of Treatment: CTA of neck and Carotid duppler showed:  moderate to severe luminal narrowing on the right external coronary artery.  You need to follow up with your PCP and Vascular for further managament       Diagnosis: Lung nodule  Assessment and Plan of Treatment: CT Showed 0.7 cm nodule in the left lung apex.  You need to follow up with your PCP for further work up     PRINCIPAL DISCHARGE DIAGNOSIS  Diagnosis: Ischemic stroke  Assessment and Plan of Treatment: You were admitted for bilateral lower leg weakness and unsteady gait. CT Head did no showed acute pathologies but further MRI showed Numerous acute right cerebral hemisphere infarctions and 2 tiny left frontal lobe infarcts.  You were treated with statin and anticoagulation and constant monitor of your hear rate.  Neurologist was consulted and recommended continue with medications and cardiologist consult.  Cardiologist was consulted and recommended to start Metoprolol 25mg daily, continue with eliquis ,  atorvastatatin and follow up with Electrophysiologist   You need to follow up with your PCP, Neurologist, cardiologist for further management.  Return to the Emergency Department for worsening or persistent symptoms, and/or ANY NEW OR CONCERNING SYMPTOMS. If you have issues obtaining follow up, please call: [phone] or [phone]  to obtain a doctor or specialist who takes your insurance in your area.      SECONDARY DISCHARGE DIAGNOSES  Diagnosis: External carotid artery stenosis  Assessment and Plan of Treatment: CTA of neck and Carotid doppler showed:  moderate to severe luminal narrowing on the right external coronary artery.  You need to follow up with your PCP and Vascular for further managament       Diagnosis: Lung nodule  Assessment and Plan of Treatment: CT Showed 0.7 cm nodule in the left lung apex.  You need to follow up with your PCP, pulmonary doctor for further work up

## 2024-12-24 NOTE — DISCHARGE NOTE PROVIDER - NPI NUMBER (FOR [employer] USE ONLY) :
[[id number]] [[id number]],[[id number]],[[id number]] [[id number]],[[id number]],[[id number]] [[id number]],[[id number]],[[id number]],[[id number]],[[id number]] [[id number]],[[id number]],[[id number]],[[id number]],[[id number]],[[id number]]

## 2024-12-25 VITALS
RESPIRATION RATE: 18 BRPM | DIASTOLIC BLOOD PRESSURE: 80 MMHG | OXYGEN SATURATION: 98 % | SYSTOLIC BLOOD PRESSURE: 138 MMHG | HEART RATE: 65 BPM | TEMPERATURE: 98 F

## 2024-12-25 LAB
A1C WITH ESTIMATED AVERAGE GLUCOSE RESULT: 8.4 % — HIGH (ref 4–5.6)
CHOLEST SERPL-MCNC: 137 MG/DL — SIGNIFICANT CHANGE UP
ESTIMATED AVERAGE GLUCOSE: 194 MG/DL — HIGH (ref 68–114)
GLUCOSE BLDC GLUCOMTR-MCNC: 190 MG/DL — HIGH (ref 70–99)
GLUCOSE BLDC GLUCOMTR-MCNC: 200 MG/DL — HIGH (ref 70–99)
HCT VFR BLD CALC: 38.3 % — LOW (ref 39–50)
HDLC SERPL-MCNC: 36 MG/DL — LOW
HGB BLD-MCNC: 13.1 G/DL — SIGNIFICANT CHANGE UP (ref 13–17)
LIPID PNL WITH DIRECT LDL SERPL: 80 MG/DL — SIGNIFICANT CHANGE UP
MAGNESIUM SERPL-MCNC: 1.9 MG/DL — SIGNIFICANT CHANGE UP (ref 1.6–2.6)
MCHC RBC-ENTMCNC: 31.6 PG — SIGNIFICANT CHANGE UP (ref 27–34)
MCHC RBC-ENTMCNC: 34.2 G/DL — SIGNIFICANT CHANGE UP (ref 32–36)
MCV RBC AUTO: 92.3 FL — SIGNIFICANT CHANGE UP (ref 80–100)
NON HDL CHOLESTEROL: 101 MG/DL — SIGNIFICANT CHANGE UP
NRBC # BLD: 0 /100 WBCS — SIGNIFICANT CHANGE UP (ref 0–0)
PHOSPHATE SERPL-MCNC: 4.3 MG/DL — SIGNIFICANT CHANGE UP (ref 2.5–4.5)
PLATELET # BLD AUTO: 259 K/UL — SIGNIFICANT CHANGE UP (ref 150–400)
RBC # BLD: 4.15 M/UL — LOW (ref 4.2–5.8)
RBC # FLD: 12.5 % — SIGNIFICANT CHANGE UP (ref 10.3–14.5)
TRIGL SERPL-MCNC: 118 MG/DL — SIGNIFICANT CHANGE UP
WBC # BLD: 7.15 K/UL — SIGNIFICANT CHANGE UP (ref 3.8–10.5)
WBC # FLD AUTO: 7.15 K/UL — SIGNIFICANT CHANGE UP (ref 3.8–10.5)

## 2024-12-25 PROCEDURE — 70498 CT ANGIOGRAPHY NECK: CPT | Mod: MC

## 2024-12-25 PROCEDURE — 84100 ASSAY OF PHOSPHORUS: CPT

## 2024-12-25 PROCEDURE — 83735 ASSAY OF MAGNESIUM: CPT

## 2024-12-25 PROCEDURE — 93010 ELECTROCARDIOGRAM REPORT: CPT

## 2024-12-25 PROCEDURE — 85025 COMPLETE CBC W/AUTO DIFF WBC: CPT

## 2024-12-25 PROCEDURE — 70450 CT HEAD/BRAIN W/O DYE: CPT | Mod: MC

## 2024-12-25 PROCEDURE — 99239 HOSP IP/OBS DSCHRG MGMT >30: CPT | Mod: GC

## 2024-12-25 PROCEDURE — 80061 LIPID PANEL: CPT

## 2024-12-25 PROCEDURE — 93306 TTE W/DOPPLER COMPLETE: CPT

## 2024-12-25 PROCEDURE — 97116 GAIT TRAINING THERAPY: CPT

## 2024-12-25 PROCEDURE — 85610 PROTHROMBIN TIME: CPT

## 2024-12-25 PROCEDURE — 82962 GLUCOSE BLOOD TEST: CPT

## 2024-12-25 PROCEDURE — 97161 PT EVAL LOW COMPLEX 20 MIN: CPT

## 2024-12-25 PROCEDURE — 85730 THROMBOPLASTIN TIME PARTIAL: CPT

## 2024-12-25 PROCEDURE — 71045 X-RAY EXAM CHEST 1 VIEW: CPT

## 2024-12-25 PROCEDURE — 99285 EMERGENCY DEPT VISIT HI MDM: CPT

## 2024-12-25 PROCEDURE — 70496 CT ANGIOGRAPHY HEAD: CPT | Mod: MC

## 2024-12-25 PROCEDURE — 83036 HEMOGLOBIN GLYCOSYLATED A1C: CPT

## 2024-12-25 PROCEDURE — 99232 SBSQ HOSP IP/OBS MODERATE 35: CPT

## 2024-12-25 PROCEDURE — 70551 MRI BRAIN STEM W/O DYE: CPT | Mod: MC

## 2024-12-25 PROCEDURE — 36415 COLL VENOUS BLD VENIPUNCTURE: CPT

## 2024-12-25 PROCEDURE — 80053 COMPREHEN METABOLIC PANEL: CPT

## 2024-12-25 PROCEDURE — 85027 COMPLETE CBC AUTOMATED: CPT

## 2024-12-25 PROCEDURE — 93005 ELECTROCARDIOGRAM TRACING: CPT

## 2024-12-25 PROCEDURE — 80048 BASIC METABOLIC PNL TOTAL CA: CPT

## 2024-12-25 PROCEDURE — 93880 EXTRACRANIAL BILAT STUDY: CPT

## 2024-12-25 PROCEDURE — 84484 ASSAY OF TROPONIN QUANT: CPT

## 2024-12-25 RX ORDER — METOPROLOL TARTRATE 50 MG
1 TABLET ORAL
Qty: 30 | Refills: 0
Start: 2024-12-25 | End: 2025-01-23

## 2024-12-25 RX ADMIN — APIXABAN 5 MILLIGRAM(S): 5 TABLET, FILM COATED ORAL at 05:53

## 2024-12-25 RX ADMIN — PANTOPRAZOLE 40 MILLIGRAM(S): 40 TABLET, DELAYED RELEASE ORAL at 06:01

## 2024-12-25 RX ADMIN — Medication 1: at 08:46

## 2024-12-25 RX ADMIN — LEVOTHYROXINE SODIUM 75 MICROGRAM(S): 175 TABLET ORAL at 05:53

## 2024-12-25 RX ADMIN — Medication 1 TABLET(S): at 12:25

## 2024-12-25 RX ADMIN — Medication 1: at 12:26

## 2024-12-25 NOTE — PROGRESS NOTE ADULT - SUBJECTIVE AND OBJECTIVE BOX
INTERVAL HPI/ OVERNIGHT EVENTS: No overnight events,  Patient was seen and examined at bedside. No acute complains.         PHYSICAL EXAM:  Vital Signs Last 24 Hrs  T(C): 36.5 (25 Dec 2024 05:08), Max: 36.6 (24 Dec 2024 21:26)  T(F): 97.7 (25 Dec 2024 05:08), Max: 97.8 (24 Dec 2024 21:26)  HR: 88 (25 Dec 2024 05:08) (81 - 90)  BP: 144/81 (25 Dec 2024 05:08) (144/81 - 160/80)  BP(mean): --  RR: 16 (25 Dec 2024 05:08) (16 - 18)  SpO2: 98% (25 Dec 2024 05:08) (98% - 98%)    Parameters below as of 25 Dec 2024 05:08  Patient On (Oxygen Delivery Method): room air          Constitutional: Pt lying in bed, awake and alert, NAD  HEENT: EOMI, normal hearing, moist mucous membranes  Neck: Soft and supple, no JVD  Respiratory: CTABL, No wheezing, rales or rhonchi  Cardiovascular: S1S2+, RRR, no M/G/R  Gastrointestinal: BS+, soft, NT/ND, no guarding, no rebound  Extremities: No peripheral edema  Vascular: 2+ peripheral pulses  Neurological: AAOx3, no focal deficits   Skin: No rashes    MEDICATIONS:  MEDICATIONS  (STANDING):  apixaban 5 milliGRAM(s) Oral every 12 hours  aspirin  chewable 81 milliGRAM(s) Oral daily  atorvastatin 80 milliGRAM(s) Oral at bedtime  dextrose 5%. 1000 milliLiter(s) (100 mL/Hr) IV Continuous <Continuous>  dextrose 5%. 1000 milliLiter(s) (50 mL/Hr) IV Continuous <Continuous>  dextrose 50% Injectable 25 Gram(s) IV Push once  dextrose 50% Injectable 12.5 Gram(s) IV Push once  dextrose 50% Injectable 25 Gram(s) IV Push once  glucagon  Injectable 1 milliGRAM(s) IntraMuscular once  insulin lispro (ADMELOG) corrective regimen sliding scale   SubCutaneous three times a day before meals  insulin lispro (ADMELOG) corrective regimen sliding scale   SubCutaneous at bedtime  levothyroxine 75 MICROGram(s) Oral daily  multivitamin 1 Tablet(s) Oral daily  pantoprazole    Tablet 40 milliGRAM(s) Oral before breakfast      LABS: All Labs Reviewed:                         13.1   7.15  )-----------( 259      ( 25 Dec 2024 06:19 )             38.3       12-24    137  |  103  |  14  ----------------------------<  233[H]  3.9   |  23  |  1.24    Ca    9.3      24 Dec 2024 18:24  Phos  4.3     12-25  Mg     1.9     12-25    TPro  7.5  /  Alb  3.8  /  TBili  1.0  /  DBili  x   /  AST  35  /  ALT  41  /  AlkPhos  59  12-24              Urinalysis Basic - ( 24 Dec 2024 18:24 )    Color: x / Appearance: x / SG: x / pH: x  Gluc: 233 mg/dL / Ketone: x  / Bili: x / Urobili: x   Blood: x / Protein: x / Nitrite: x   Leuk Esterase: x / RBC: x / WBC x   Sq Epi: x / Non Sq Epi: x / Bacteria: x        PT/INR - ( 23 Dec 2024 11:30 )   PT: 14.8 sec;   INR: 1.26 ratio         PTT - ( 23 Dec 2024 11:30 )  PTT:32.6 sec          CAPILLARY BLOOD GLUCOSE      POCT Blood Glucose.: 190 mg/dL (25 Dec 2024 08:02)                          RADIOLOGY/EKG:  < from: MR Head No Cont (12.24.24 @ 09:01) >  INTERPRETATION:  CLINICAL INDICATIONS: rule out stroke    No Predefined   Suggestion(s)    COMPARISON: Head CT dated 12/23/2024.    TECHNIQUE: MRI brain: Multiplanar, multisequence MR imaging of the brain   are obtained without the administration of intravenous Gadavist contrast.    FINDINGS:  Chronic moderate to large right frontal lobe infarction with cystic   encephalomalacia and gliosis.  There multiple foci of abnormal restricted diffusion involving the right   caudate head, right corpus callosum genu, right frontal lobe, right   parietal lobe, right occipital lobe, right temporal lobe compatible with   acute infarctions. Acutetiny left frontal lobe cortical infarcts image   27 of series 2. Chronic small right cerebellar infarct image 7 of series   6.. Scattered periventricular and subcortical white matter T2 /FLAIR   hyperintensities are seen without mass effect, nonspecific, likely   representing moderate to severe chronic microvascular changes. . Normal   T2 flow-voids are seen within  the intracranial vasculature. The lateral   ventricles and cortical sulci are age-appropriate in size and   configuration. There is no mass, mass effect, or extra-axial fluid   collection. There is no susceptibility artifact to suggest hemorrhage.   Midline structures are normal.  The visualized paranasal sinuses, mastoid   air cells and orbits are unremarkable.      IMPRESSION: Numerous acute right cerebral hemisphere infarctions. 2 tiny   left frontal lobe infarcts.    --- End of Report ---          < end of copied text >  < from: US Duplex Carotid Arteries Complete, Bilateral (12.23.24 @ 19:02) >  INTERPRETATION:  CLINICAL INFORMATION: Abnormal CTA neck findings.    COMPARISON: CTA neck 12/23/2024, 3/20/2016.    TECHNIQUE: Grayscale, color and spectral Doppler examination of both   carotid arteries was performed.    FINDINGS:    Heart rate is intermittently irregular. Correlate for arrhythmia.    Bilateral carotid artery intimal thickening and plaque, with up to mild   category disease along bilateral common and internal iliac arteries.   Elevated velocity of the left external carotid artery reflects   significant stenosis.    Peak systolic velocities are as follows:    RIGHT:  PROX CCA = 107 cm/s  MID CCA = 76 cm/s  DIST CCA = 72 cm/s  PROX ICA = 78 cm/s  MID ICA = 78 cm/s  DIST ICA = 83 cm/s  ECA = 124 cm/s    LEFT:  PROX CCA = 98 cm/s  MID CCA = 107 cm/s  DIST CCA = 72 cm/s  PROX ICA = 105 cm/s  MID ICA = 92 cm/s  DIST ICA = 91 cm/s  ECA = 209 cm/s    Antegrade flow is noted within both vertebral arteries.    IMPRESSION:    Bilateral carotid artery plaque  -No hemodynamically significant stenosis right or left internal carotid   artery stenosis.  -Elevated velocity of the left external carotid artery reflects   significant stenosis.  -Follow-up carotid duplex ultrasound is recommended in 6 months for   reevaluation.    Heart rate is intermittently irregular. Correlate for arrhythmia.    Measurement of carotid stenosis is based on updated recommendations for   carotid stenosis interpretation criteria from the [employer]     < end of copied text >

## 2024-12-25 NOTE — PROGRESS NOTE ADULT - SUBJECTIVE AND OBJECTIVE BOX
Chief Complaint:     follow up for afib and cva    sitting in chair wife at bedside anxious for discharge Abdiaziz Day.   PMH:   GERD (gastroesophageal reflux disease)    Type 2 diabetes mellitus    Hyperlipidemia    Cerebrovascular accident (CVA)    Adhesive capsulitis of right shoulder      PSH:   H/O pilonidal cyst      Family History:  FAMILY HISTORY:  Family hx of lung cancer (Father, Mother, Sibling)        Social History:  Smoking:  Alcohol:  Drugs:    Allergies:  No Known Allergies      Medications:  acetaminophen     Tablet .. 650 milliGRAM(s) Oral every 6 hours PRN  aluminum hydroxide/magnesium hydroxide/simethicone Suspension 30 milliLiter(s) Oral every 4 hours PRN  apixaban 5 milliGRAM(s) Oral every 12 hours  atorvastatin 80 milliGRAM(s) Oral at bedtime  dextrose 5%. 1000 milliLiter(s) IV Continuous <Continuous>  dextrose 5%. 1000 milliLiter(s) IV Continuous <Continuous>  dextrose 50% Injectable 25 Gram(s) IV Push once  dextrose 50% Injectable 12.5 Gram(s) IV Push once  dextrose 50% Injectable 25 Gram(s) IV Push once  dextrose Oral Gel 15 Gram(s) Oral once PRN  glucagon  Injectable 1 milliGRAM(s) IntraMuscular once  insulin lispro (ADMELOG) corrective regimen sliding scale   SubCutaneous three times a day before meals  insulin lispro (ADMELOG) corrective regimen sliding scale   SubCutaneous at bedtime  levothyroxine 75 MICROGram(s) Oral daily  melatonin 3 milliGRAM(s) Oral at bedtime PRN  multivitamin 1 Tablet(s) Oral daily  ondansetron Injectable 4 milliGRAM(s) IV Push every 8 hours PRN  pantoprazole    Tablet 40 milliGRAM(s) Oral before breakfast      REVIEW OF SYSTEMS:  CONSTITUTIONAL: No fever, weight loss, or fatigue  EYES: No eye pain, visual disturbances, or discharge  ENMT:  No difficulty hearing, tinnitus, vertigo; No sinus or throat pain  NECK: No pain or stiffness  BREASTS: No pain, masses, or nipple discharge  RESPIRATORY: No cough, wheezing, chills or hemoptysis; No shortness of breath  CARDIOVASCULAR: No chest pain, palpitations, dizziness, or leg swelling  GASTROINTESTINAL: No abdominal or epigastric pain. No nausea, vomiting, or hematemesis; No diarrhea or constipation. No melena or hematochezia.  GENITOURINARY: No dysuria, frequency, hematuria, or incontinence  NEUROLOGICAL: No headaches, memory loss, loss of strength, numbness, or tremors  SKIN: No itching, burning, rashes, or lesions   LYMPH NODES: No enlarged glands  ENDOCRINE: No heat or cold intolerance; No hair loss  MUSCULOSKELETAL: No joint pain or swelling; No muscle, back, or extremity pain  PSYCHIATRIC: No depression, anxiety, mood swings, or difficulty sleeping  HEME/LYMPH: No easy bruising, or bleeding gums  ALLERY AND IMMUNOLOGIC: No hives or eczema    Physical Exam:  T(C): 36.5 (24 @ 05:08), Max: 36.6 (24 @ 21:26)  HR: 88 (24 @ 05:08) (81 - 90)  BP: 144/81 (24 @ 05:08) (144/81 - 160/80)  RR: 16 (24 @ 05:08) (16 - 18)  SpO2: 98% (24 @ 05:08) (98% - 98%)  Wt(kg): --    GENERAL: NAD, well-groomed, well-developed  HEAD:  Atraumatic, Normocephalic  EYES: EOMI, conjunctiva and sclera clear  ENT: Moist mucous membranes,  NECK: Supple, No JVD, no bruits  CHEST/LUNG: Clear to percussion bilaterally; No rales, rhonchi, wheezing, or rubs  HEART: Regular rate and rhythm; No murmurs, rubs, or gallops PMI non displaced.  ABDOMEN: Soft, Nontender, Nondistended; Bowel sounds present  EXTREMITIES:  2+ Peripheral Pulses, No clubbing, cyanosis, or edema  SKIN: No rashes or lesions  NERVOUS SYSTEM:  Cranial Nerves II-XII intact    Cardiovascular Diagnostic Testing:  ECG:    < from: 12 Lead ECG (24 @ 11:15) >    Diagnosis Line Atrial fibrillation with a competing junctional pacemaker  Abnormal ECG  When compared with ECG of 19-MAR-2016 12:10,  Atrial fibrillation has replaced Sinus rhythm    Confirmed by IZABELA QUIROZ MD () on 2024 7:49:04 AM    < end of copied text >      ECHO:    < from: TTE Echo Complete w/o Contrast w/ Doppler (24 @ 11:12) >    CONCLUSIONS:     1. Left ventricular cavity is small.   2. Normal right ventricular cavity sizeTricuspid annular plane systolic   excursion (TAPSE) is 1.6 cm (normal >=1.7 cm).   3. Trace mitral regurgitation.   4. Non coronary cusp calcified.   5. Structurally normal pulmonic valve with normal leaflet excursion.   6. Structurally normal mitral valve with normal leaflet excursion.   7. Structurally normal tricuspid valve with normal leaflet excursion.   8. No evidence of aortic regurgitation.   9. No evidence of pulmonic regurgitation.  10. No left ventricular hypertrophy.  11. There is mild calcification of the aortic valve leaflets.    ___________________________________________________________________________  _____________  FINDINGS:    Left Ventricle:  The left ventricular cavity is small. No left ventricular hypertrophy.  LV Wall Scoring: All segments are normal.        Right Ventricle:  The right ventricular cavity is normal in size. Tricuspid annular plane   systolic excursion (TAPSE) is 1.6 cm (normal >=1.7 cm). Tricuspid annular   tissue Doppler S' is 15.9 cm/s (normal >10 cm/s).    Aortic Valve:  The aortic valve appears trileaflet. There is mild calcification of the   aortic valve leaflets. There is moderate thickening of the aortic valve   leaflets. Non coronary cusp calcified. There is no evidence of aortic   regurgitation.    Mitral Valve:  Structurally normal mitral valve with normal leaflet excursion. There is   trace mitral regurgitation.    TricuspidValve:  Structurally normal tricuspid valve with normal leaflet excursion. There   is trace tricuspid regurgitation.    Pulmonic Valve:  Structurally normal pulmonic valve with normal leaflet excursion. There   is no evidence of pulmonic regurgitation.    Aorta:  The aortic root and ascending aorta appear normal in size.  ____________________________________________________________________  QUANTITATIVE DATA:  Left Ventricle Measurements: (Indexed to BSA)    IVSd (2D):   1.4 cm  LVPWd (2D):  1.4 cm  LVIDd (2D):  4.1 cm  LVIDs (2D):  2.6 cm  LV Mass:     223 g  112.6 g/m²  LV Vol d, MOD A2C: 36.4 ml 18.38 ml/m²  LV Vol d, MOD A4C: 31.7 ml 16.01 ml/m²  LV Vol d, MOD BP:  34.5 ml 17.42 ml/m²  LV Vol s, MOD A2C: 15.8 ml 7.98 ml/m²  LV Vol s, MOD A4C: 11.7 ml 5.91 ml/m²  LV Vol s, MOD BP:  14.5 ml 7.30 ml/m²  LVOT SV MOD BP:    20.0 ml  LV EF% MOD BP:     58 %    MV E Vmax:    0.95 m/s  MV A Vmax:    0.31 m/s  MV E/A:       3.04  e' lateral:   10.70 cm/s  e' medial:    11.40 cm/s  E/e' lateral: 8.92  E/e' medial:  8.37  E/e' Average: 8.63  MV DT:        195 msec    Aorta Measurements: (Normal range) (Indexed to BSA)    Ao Root d     2.60 cm (3.1 - 3.7 cm) 1.31 cm/m²  Ao Asc d, 2D: 2.70          Left Atrium Measurements: (Indexed to BSA)  LA Diam 2D: 3.00 cm        Right Ventricle Measurements: Right Atrial Measurements:    TAPSE:      1.6 cm            RA Vol s, MOD A4C  32.9 ml  RV S' Vmax: 15.90 cm/s        RA Area s, MOD A4C 14.8 cm²      LVOT / RVOT/ Qp/Qs Data: (Indexed to BSA)  LVOT Diameter:  1.80 cm  LVOT Area:      2.54 cm²  LVOT Vmax:      1.14 m/s  LVOT Vmn:       0.723 m/s  LVOT VTI:       21.30 cm  LVOT peak grad: 5 mmHg  LVOT mean grad: 2.5 mmHg  LVOT SV:        54.2 ml   27.37 ml/m²    Aortic Valve Measurements:  AV Vmax:                1.7 m/s  AV Peak Gradient:       12.0 mmHg  AV Mean Gradient:       6.5 mmHg  AV VTI:                 36.0 cm  AV VTI Ratio:           0.59  AoV EOA, Contin:        1.51 cm²  AoV EOA, Contin i:      0.76 cm²/m²  AoV Dimensionless Index 0.59    Mitral Valve Measurements:    MV E Vmax: 1.0 m/s         MR Vmax:          1.52 m/s  MV A Vmax: 0.3 m/s         MR Peak Gradient: 9.2 mmHg  MV E/A:    3.0      Tricuspid Valve Measurements:    TV S'             15.9 cm/s  TR Vmax:  2.3 m/s  TR Peak Gradient: 21.7 mmHg      Pulmonic Valve Measurments:  PV Vmax:          1.0 m/s  PV Peak Gradient: 4.2 mmHg    ___________________________________________________________________________  _____________  Electronically signed on 2024 at 2:39:36 PM by Izabela Quiroz        *** Final ***    < end of copied text >      Labs:                        13.1   7.15  )-----------( 259      ( 25 Dec 2024 06:19 )             38.3         137  |  103  |  14  ----------------------------<  233[H]  3.9   |  23  |  1.24    Ca    9.3      24 Dec 2024 18:24  Phos  4.3     12-  Mg     1.9         TPro  7.5  /  Alb  3.8  /  TBili  1.0  /  DBili  x   /  AST  35  /  ALT  41  /  AlkPhos  59          Total Cholesterol: 137  LDL: --  HDL: 36  T  Total Cholesterol: 152  LDL: --  HDL: 33  T    Imaging:    impression  recurrent CVA  discussed options for management of Afib including ablation cardioversion left atrial appendage occlusion watchman procedure vs a change in the anticoagulation regimen. patient declines further in patient care. may consider beta blocoker for rate control     care coordinated with dsr    Chief Complaint:     follow up for afib and cva    sitting in chair wife at bedside anxious for discharge Abdiaziz Day.   PMH:   GERD (gastroesophageal reflux disease)    Type 2 diabetes mellitus    Hyperlipidemia    Cerebrovascular accident (CVA)    Adhesive capsulitis of right shoulder      PSH:   H/O pilonidal cyst      Family History:  FAMILY HISTORY:  Family hx of lung cancer (Father, Mother, Sibling)        Social History:  Smoking:  Alcohol:  Drugs:    Allergies:  No Known Allergies      Medications:  acetaminophen     Tablet .. 650 milliGRAM(s) Oral every 6 hours PRN  aluminum hydroxide/magnesium hydroxide/simethicone Suspension 30 milliLiter(s) Oral every 4 hours PRN  apixaban 5 milliGRAM(s) Oral every 12 hours  atorvastatin 80 milliGRAM(s) Oral at bedtime  dextrose 5%. 1000 milliLiter(s) IV Continuous <Continuous>  dextrose 5%. 1000 milliLiter(s) IV Continuous <Continuous>  dextrose 50% Injectable 25 Gram(s) IV Push once  dextrose 50% Injectable 12.5 Gram(s) IV Push once  dextrose 50% Injectable 25 Gram(s) IV Push once  dextrose Oral Gel 15 Gram(s) Oral once PRN  glucagon  Injectable 1 milliGRAM(s) IntraMuscular once  insulin lispro (ADMELOG) corrective regimen sliding scale   SubCutaneous three times a day before meals  insulin lispro (ADMELOG) corrective regimen sliding scale   SubCutaneous at bedtime  levothyroxine 75 MICROGram(s) Oral daily  melatonin 3 milliGRAM(s) Oral at bedtime PRN  multivitamin 1 Tablet(s) Oral daily  ondansetron Injectable 4 milliGRAM(s) IV Push every 8 hours PRN  pantoprazole    Tablet 40 milliGRAM(s) Oral before breakfast      REVIEW OF SYSTEMS:  CONSTITUTIONAL: No fever, weight loss, or fatigue  EYES: No eye pain, visual disturbances, or discharge  ENMT:  No difficulty hearing, tinnitus, vertigo; No sinus or throat pain  NECK: No pain or stiffness  BREASTS: No pain, masses, or nipple discharge  RESPIRATORY: No cough, wheezing, chills or hemoptysis; No shortness of breath  CARDIOVASCULAR: No chest pain, palpitations, dizziness, or leg swelling  GASTROINTESTINAL: No abdominal or epigastric pain. No nausea, vomiting, or hematemesis; No diarrhea or constipation. No melena or hematochezia.  GENITOURINARY: No dysuria, frequency, hematuria, or incontinence  NEUROLOGICAL: No headaches, memory loss, loss of strength, numbness, or tremors  SKIN: No itching, burning, rashes, or lesions   LYMPH NODES: No enlarged glands  ENDOCRINE: No heat or cold intolerance; No hair loss  MUSCULOSKELETAL: No joint pain or swelling; No muscle, back, or extremity pain  PSYCHIATRIC: No depression, anxiety, mood swings, or difficulty sleeping  HEME/LYMPH: No easy bruising, or bleeding gums  ALLERY AND IMMUNOLOGIC: No hives or eczema    Physical Exam:  T(C): 36.5 (24 @ 05:08), Max: 36.6 (24 @ 21:26)  HR: 88 (24 @ 05:08) (81 - 90)  BP: 144/81 (24 @ 05:08) (144/81 - 160/80)  RR: 16 (24 @ 05:08) (16 - 18)  SpO2: 98% (24 @ 05:08) (98% - 98%)  Wt(kg): --    GENERAL: NAD, well-groomed, well-developed  HEAD:  Atraumatic, Normocephalic  EYES: EOMI, conjunctiva and sclera clear  ENT: Moist mucous membranes,  NECK: Supple, No JVD, no bruits  CHEST/LUNG: Clear to percussion bilaterally; No rales, rhonchi, wheezing, or rubs  HEART: Regular rate and rhythm; No murmurs, rubs, or gallops PMI non displaced.  ABDOMEN: Soft, Nontender, Nondistended; Bowel sounds present  EXTREMITIES:  2+ Peripheral Pulses, No clubbing, cyanosis, or edema  SKIN: No rashes or lesions  NERVOUS SYSTEM:  Cranial Nerves II-XII intact    Cardiovascular Diagnostic Testing:  ECG:    < from: 12 Lead ECG (24 @ 11:15) >    Diagnosis Line Atrial fibrillation with a competing junctional pacemaker  Abnormal ECG  When compared with ECG of 19-MAR-2016 12:10,  Atrial fibrillation has replaced Sinus rhythm    Confirmed by IZABELA QUIROZ MD () on 2024 7:49:04 AM    < end of copied text >      ECHO:    < from: TTE Echo Complete w/o Contrast w/ Doppler (24 @ 11:12) >    CONCLUSIONS:     1. Left ventricular cavity is small.   2. Normal right ventricular cavity sizeTricuspid annular plane systolic   excursion (TAPSE) is 1.6 cm (normal >=1.7 cm).   3. Trace mitral regurgitation.   4. Non coronary cusp calcified.   5. Structurally normal pulmonic valve with normal leaflet excursion.   6. Structurally normal mitral valve with normal leaflet excursion.   7. Structurally normal tricuspid valve with normal leaflet excursion.   8. No evidence of aortic regurgitation.   9. No evidence of pulmonic regurgitation.  10. No left ventricular hypertrophy.  11. There is mild calcification of the aortic valve leaflets.    ___________________________________________________________________________  _____________  FINDINGS:    Left Ventricle:  The left ventricular cavity is small. No left ventricular hypertrophy.  LV Wall Scoring: All segments are normal.        Right Ventricle:  The right ventricular cavity is normal in size. Tricuspid annular plane   systolic excursion (TAPSE) is 1.6 cm (normal >=1.7 cm). Tricuspid annular   tissue Doppler S' is 15.9 cm/s (normal >10 cm/s).    Aortic Valve:  The aortic valve appears trileaflet. There is mild calcification of the   aortic valve leaflets. There is moderate thickening of the aortic valve   leaflets. Non coronary cusp calcified. There is no evidence of aortic   regurgitation.    Mitral Valve:  Structurally normal mitral valve with normal leaflet excursion. There is   trace mitral regurgitation.    TricuspidValve:  Structurally normal tricuspid valve with normal leaflet excursion. There   is trace tricuspid regurgitation.    Pulmonic Valve:  Structurally normal pulmonic valve with normal leaflet excursion. There   is no evidence of pulmonic regurgitation.    Aorta:  The aortic root and ascending aorta appear normal in size.  ____________________________________________________________________  QUANTITATIVE DATA:  Left Ventricle Measurements: (Indexed to BSA)    IVSd (2D):   1.4 cm  LVPWd (2D):  1.4 cm  LVIDd (2D):  4.1 cm  LVIDs (2D):  2.6 cm  LV Mass:     223 g  112.6 g/m²  LV Vol d, MOD A2C: 36.4 ml 18.38 ml/m²  LV Vol d, MOD A4C: 31.7 ml 16.01 ml/m²  LV Vol d, MOD BP:  34.5 ml 17.42 ml/m²  LV Vol s, MOD A2C: 15.8 ml 7.98 ml/m²  LV Vol s, MOD A4C: 11.7 ml 5.91 ml/m²  LV Vol s, MOD BP:  14.5 ml 7.30 ml/m²  LVOT SV MOD BP:    20.0 ml  LV EF% MOD BP:     58 %    MV E Vmax:    0.95 m/s  MV A Vmax:    0.31 m/s  MV E/A:       3.04  e' lateral:   10.70 cm/s  e' medial:    11.40 cm/s  E/e' lateral: 8.92  E/e' medial:  8.37  E/e' Average: 8.63  MV DT:        195 msec    Aorta Measurements: (Normal range) (Indexed to BSA)    Ao Root d     2.60 cm (3.1 - 3.7 cm) 1.31 cm/m²  Ao Asc d, 2D: 2.70          Left Atrium Measurements: (Indexed to BSA)  LA Diam 2D: 3.00 cm        Right Ventricle Measurements: Right Atrial Measurements:    TAPSE:      1.6 cm            RA Vol s, MOD A4C  32.9 ml  RV S' Vmax: 15.90 cm/s        RA Area s, MOD A4C 14.8 cm²      LVOT / RVOT/ Qp/Qs Data: (Indexed to BSA)  LVOT Diameter:  1.80 cm  LVOT Area:      2.54 cm²  LVOT Vmax:      1.14 m/s  LVOT Vmn:       0.723 m/s  LVOT VTI:       21.30 cm  LVOT peak grad: 5 mmHg  LVOT mean grad: 2.5 mmHg  LVOT SV:        54.2 ml   27.37 ml/m²    Aortic Valve Measurements:  AV Vmax:                1.7 m/s  AV Peak Gradient:       12.0 mmHg  AV Mean Gradient:       6.5 mmHg  AV VTI:                 36.0 cm  AV VTI Ratio:           0.59  AoV EOA, Contin:        1.51 cm²  AoV EOA, Contin i:      0.76 cm²/m²  AoV Dimensionless Index 0.59    Mitral Valve Measurements:    MV E Vmax: 1.0 m/s         MR Vmax:          1.52 m/s  MV A Vmax: 0.3 m/s         MR Peak Gradient: 9.2 mmHg  MV E/A:    3.0      Tricuspid Valve Measurements:    TV S'             15.9 cm/s  TR Vmax:  2.3 m/s  TR Peak Gradient: 21.7 mmHg      Pulmonic Valve Measurments:  PV Vmax:          1.0 m/s  PV Peak Gradient: 4.2 mmHg    ___________________________________________________________________________  _____________  Electronically signed on 2024 at 2:39:36 PM by Izabela Quiroz        *** Final ***    < end of copied text >      Labs:                        13.1   7.15  )-----------( 259      ( 25 Dec 2024 06:19 )             38.3         137  |  103  |  14  ----------------------------<  233[H]  3.9   |  23  |  1.24    Ca    9.3      24 Dec 2024 18:24  Phos  4.3     12-  Mg     1.9         TPro  7.5  /  Alb  3.8  /  TBili  1.0  /  DBili  x   /  AST  35  /  ALT  41  /  AlkPhos  59          Total Cholesterol: 137  LDL: --  HDL: 36  T  Total Cholesterol: 152  LDL: --  HDL: 33  T    Imaging:    impression  recurrent CVA  discussed options for management of Afib including ablation cardioversion left atrial appendage occlusion watchman procedure vs a change in the anticoagulation regimen. patient declines further in patient care. may consider beta blocker for rate control . had declined ablation and ep eval previously. outpatient vascular eval dr berkowitz monitor rate control a fib. follow up primary cardiology dr li.     care coordinated with jessica narayan and viviane and wife.

## 2024-12-25 NOTE — PROGRESS NOTE ADULT - ASSESSMENT
73 y/o male with past medical hx of A-fib on Eliquis, Type 2 DM, HLD, Hypothyroidism, HDL and CVA (2016) with minor residual symptoms. Patient presents for B/L lower leg weakness and steady gait. Admitted for further workup.    #Multiple right Cerebellar Infarctions  -Patient had a CVA in 2016  -Weakness since Friday  -CT Stroke Protocol Negative   -Fall precautions  -Neuro q4h  -Lipid panel   -Labs in the AM   -Aspirin 81mg daily  -Atorvastatin 80mg  -c/w Home Eliquis  -TTE:  WNL  -MRI Head: Numerous acute right cerebral hemisphere infarctions. 2 tiny left frontal lobe infarcts.  -Neuro recs appreciated  -PT: Pending evaluation for Discharge      #Left External Carotid Artery Stenosis  -Carotid dopplers: Bilateral carotid artery plaque No hemodynamically significant stenosis right or left internal carotid  artery stenosis.  -Elevated velocity of the left external carotid artery reflects significant stenosis.   -Vascular surgeon as outpatient    #Accidental finding on Imaging   - 0.7 cm Nodule in the left lung apex.  -F/u out patient     #GERD  #Hx of Bleeding Ulcer  -c/w home Pantoprazole 20mg     #Alcohol use  -Pt has 1 beer daily  -Monitor for Withdrawals symptoms   -pt denies any prior symptoms of withdrawal    #Chronic A-fib  -Continuous Telemetry  -c/w Home Eliquis  -Cardiology recs appreciated    #Type 2 Diabetes Mellitus  -A1c: 8.6  -hold home Metformin   -Diet: Consistent Carbohydrate Diet  -ISS      #Hypothyroidism  -c/w Home Levothyroxine    #HLD  -At home Atorvastatin 20mg increased to 80mg for now    #DVT ppx  -c/w Home Eliquis    #GOC  -Full code    Case d/w Dr. Mireles    Dispo: Home         
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 Patient presents for B/L lower leg weakness and steady gait. Admitted for further workup.    #Weakness  #Unsteady gait  -Patient had a CVA in 2016  -Weakness since Friday  -CT Stroke Protocol Negative   -Fall precautions  -Neuro q4h  -Lipid panel   -Labs in the AM   -Aspirin 81mg daily  -Atorvastatin 80mg  -c/w Home Eliquis  -TTE: pending  -MRI Head: Numerous acute right cerebral hemisphere infarctions. 2 tiny left frontal lobe infarcts.  -Neuro recs appreciated  -PT: In patient rehab       #Left External Carotid Artery  -Carotid dopplers: Bilateral carotid artery plaque No hemodynamically significant stenosis right or left internal carotid  artery stenosis.  - Elevated velocity of the left external carotid artery reflects significant stenosis.   -Vascular surgeon consulted.     #Accidental finding on Imaging   - 0.7 cm Nodule in the left lung apex.  -F/u out patient     #GERD  #Hx of Bleeding Ulcer  -c/w home Pantoprazole 20mg     #Alcohol use  -Pt has 1 beer daily  -Monitor for Withdrawals symptoms   - pt denies any prior symptoms of withdrawal    #Chronic A-fib  -Continuous Telemetry  -c/w Home Eliquis  -Cardiology consulted    #Type 2 Diabetes Mellitus  -A1c: 8.6  -hold home Metformin   -Diet: Consistent Carbohydrate Diet  -ISS      #Hypothyroidism  -c/w Home Levothyroxine    #HLD  -At home Atorvastatin 20mg increased to 80mg for now    #DVT ppx  -c/w Home Eliquis    #GOC  -Full code    Case d/w Dr. Mireles

## 2024-12-25 NOTE — PROGRESS NOTE ADULT - ATTENDING COMMENTS
No acute evnts overnight. wants to go home for carleen.    T(C): 36.5 (12-25-24 @ 05:08), Max: 36.6 (12-24-24 @ 21:26)  T(F): 97.7 (12-25-24 @ 05:08), Max: 97.8 (12-24-24 @ 21:26)  HR: 88 (12-25-24 @ 05:08) (81 - 90)  BP: 144/81 (12-25-24 @ 05:08) (144/81 - 160/80)  ABP: --  ABP(mean): --  RR: 16 (12-25-24 @ 05:08) (16 - 18)  SpO2: 98% (12-25-24 @ 05:08) (98% - 98%)      on exam aaox3, no apparent distress, both lungs clear, s1, s2, regular, abdomen- soft, no pedal edema, strength appears equal in all extremities, no drift                           13.1   7.15  )-----------( 259      ( 25 Dec 2024 06:19 )             38.3   12-24    137  |  103  |  14  ----------------------------<  233[H]  3.9   |  23  |  1.24    Ca    9.3      24 Dec 2024 18:24  Phos  4.3     12-25  Mg     1.9     12-25    TPro  7.5  /  Alb  3.8  /  TBili  1.0  /  DBili  x   /  AST  35  /  ALT  41  /  AlkPhos  59  12-24     MRI reviewed showed multiple right cerebral infarcts, two left frontal infarcts    a/p:  # Acute stroke- Not a candidate for TNK, last known well time not clear. continue eliquis, strict control of vascular risk factors. seen by PT, stable to go home. seen by neuro and cardio, recommendations reviewed. outpatient follow up with vascular.   # Chronic atrial fibrillation- continue apixaban, ? not on rate control. once cleared by cardio, can be discharged home.   # DM2 with hyperglycemia- insulin and finger stick monitoring. check hba1c  # HLD- statin  # Hypothyroidism- levothyroxine  - rest as per resident note  disch dispo-  pending cardio input No acute evnts overnight. wants to go home for carleen.    T(C): 36.5 (12-25-24 @ 05:08), Max: 36.6 (12-24-24 @ 21:26)  T(F): 97.7 (12-25-24 @ 05:08), Max: 97.8 (12-24-24 @ 21:26)  HR: 88 (12-25-24 @ 05:08) (81 - 90)  BP: 144/81 (12-25-24 @ 05:08) (144/81 - 160/80)  ABP: --  ABP(mean): --  RR: 16 (12-25-24 @ 05:08) (16 - 18)  SpO2: 98% (12-25-24 @ 05:08) (98% - 98%)      on exam aaox3, no apparent distress, both lungs clear, s1, s2, regular, abdomen- soft, no pedal edema, strength appears equal in all extremities, no drift                           13.1   7.15  )-----------( 259      ( 25 Dec 2024 06:19 )             38.3   12-24    137  |  103  |  14  ----------------------------<  233[H]  3.9   |  23  |  1.24    Ca    9.3      24 Dec 2024 18:24  Phos  4.3     12-25  Mg     1.9     12-25    TPro  7.5  /  Alb  3.8  /  TBili  1.0  /  DBili  x   /  AST  35  /  ALT  41  /  AlkPhos  59  12-24     MRI reviewed showed multiple right cerebral infarcts, two left frontal infarcts    a/p:  # Acute stroke- Not a candidate for TNK, last known well time not clear. continue eliquis, strict control of vascular risk factors. seen by PT, stable to go home. seen by neuro and cardio, recommendations reviewed. outpatient follow up with vascular.   # Chronic atrial fibrillation- continue apixaban, ? not on rate control. once cleared by cardio, can be discharged home.   # DM2 with hyperglycemia- hba1c 8.4, patient only on metformin 1000 bid at home, will add farxiga . outpatient follow up with endocrine.   # HLD- statin  # Hypothyroidism- levothyroxine  - rest as per resident note  disch dispo-  pending cardio input No acute evnts overnight. wants to go home for carleen.    T(C): 36.5 (12-25-24 @ 05:08), Max: 36.6 (12-24-24 @ 21:26)  T(F): 97.7 (12-25-24 @ 05:08), Max: 97.8 (12-24-24 @ 21:26)  HR: 88 (12-25-24 @ 05:08) (81 - 90)  BP: 144/81 (12-25-24 @ 05:08) (144/81 - 160/80)  ABP: --  ABP(mean): --  RR: 16 (12-25-24 @ 05:08) (16 - 18)  SpO2: 98% (12-25-24 @ 05:08) (98% - 98%)      on exam aaox3, no apparent distress, both lungs clear, s1, s2, regular, abdomen- soft, no pedal edema, strength appears equal in all extremities, no drift                           13.1   7.15  )-----------( 259      ( 25 Dec 2024 06:19 )             38.3   12-24    137  |  103  |  14  ----------------------------<  233[H]  3.9   |  23  |  1.24    Ca    9.3      24 Dec 2024 18:24  Phos  4.3     12-25  Mg     1.9     12-25    TPro  7.5  /  Alb  3.8  /  TBili  1.0  /  DBili  x   /  AST  35  /  ALT  41  /  AlkPhos  59  12-24     MRI reviewed showed multiple right cerebral infarcts, two left frontal infarcts    a/p:  # Acute stroke- Not a candidate for TNK, last known well time not clear. continue eliquis, strict control of vascular risk factors. seen by PT, stable to go home. seen by neuro and cardio, recommendations reviewed. outpatient follow up with vascular.   # Chronic atrial fibrillation- continue apixaban, ? not on rate control. once cleared by cardio, can be discharged home.   # DM2 with hyperglycemia- hba1c 8.4, patient only on metformin 1000 bid at home, will add farxiga . outpatient follow up with endocrine.   # HLD- statin  # Lung nodule- outpatient follow up with pulmo.   # Hypothyroidism- levothyroxine  - rest as per resident note  disch dispo-  pending cardio input

## 2024-12-30 ENCOUNTER — NON-APPOINTMENT (OUTPATIENT)
Age: 74
End: 2024-12-30

## 2024-12-30 ENCOUNTER — APPOINTMENT (OUTPATIENT)
Dept: CARDIOLOGY | Facility: CLINIC | Age: 74
End: 2024-12-30

## 2024-12-30 ENCOUNTER — APPOINTMENT (OUTPATIENT)
Dept: CARDIOLOGY | Facility: CLINIC | Age: 74
End: 2024-12-30
Payer: MEDICARE

## 2024-12-30 VITALS
HEART RATE: 63 BPM | HEIGHT: 68 IN | SYSTOLIC BLOOD PRESSURE: 136 MMHG | DIASTOLIC BLOOD PRESSURE: 81 MMHG | WEIGHT: 186 LBS | BODY MASS INDEX: 28.19 KG/M2 | OXYGEN SATURATION: 98 %

## 2024-12-30 DIAGNOSIS — I63.9 CEREBRAL INFARCTION, UNSPECIFIED: ICD-10-CM

## 2024-12-30 PROCEDURE — 93000 ELECTROCARDIOGRAM COMPLETE: CPT

## 2024-12-30 PROCEDURE — 99214 OFFICE O/P EST MOD 30 MIN: CPT | Mod: 25

## 2025-01-02 ENCOUNTER — APPOINTMENT (OUTPATIENT)
Dept: NEUROLOGY | Facility: CLINIC | Age: 75
End: 2025-01-02
Payer: MEDICARE

## 2025-01-02 ENCOUNTER — NON-APPOINTMENT (OUTPATIENT)
Age: 75
End: 2025-01-02

## 2025-01-02 VITALS
HEIGHT: 68 IN | SYSTOLIC BLOOD PRESSURE: 123 MMHG | OXYGEN SATURATION: 98 % | BODY MASS INDEX: 28.19 KG/M2 | TEMPERATURE: 98.1 F | DIASTOLIC BLOOD PRESSURE: 73 MMHG | WEIGHT: 186 LBS | HEART RATE: 62 BPM

## 2025-01-02 DIAGNOSIS — Z86.73 PERSONAL HISTORY OF TRANSIENT ISCHEMIC ATTACK (TIA), AND CEREBRAL INFARCTION W/OUT RESIDUAL DEFICITS: ICD-10-CM

## 2025-01-02 PROCEDURE — G2211 COMPLEX E/M VISIT ADD ON: CPT

## 2025-01-02 PROCEDURE — 99215 OFFICE O/P EST HI 40 MIN: CPT

## 2025-01-02 RX ORDER — UBIDECARENONE 100 MG
100 CAPSULE ORAL
Refills: 0 | Status: ACTIVE | COMMUNITY

## 2025-01-03 ENCOUNTER — APPOINTMENT (OUTPATIENT)
Age: 75
End: 2025-01-03
Payer: MEDICARE

## 2025-01-03 VITALS
DIASTOLIC BLOOD PRESSURE: 70 MMHG | HEART RATE: 63 BPM | SYSTOLIC BLOOD PRESSURE: 120 MMHG | HEIGHT: 68 IN | WEIGHT: 186 LBS | TEMPERATURE: 97.3 F | BODY MASS INDEX: 28.19 KG/M2 | OXYGEN SATURATION: 98 %

## 2025-01-03 DIAGNOSIS — R27.0 ATAXIA, UNSPECIFIED: ICD-10-CM

## 2025-01-03 DIAGNOSIS — Z09 ENCOUNTER FOR FOLLOW-UP EXAMINATION AFTER COMPLETED TREATMENT FOR CONDITIONS OTHER THAN MALIGNANT NEOPLASM: ICD-10-CM

## 2025-01-03 DIAGNOSIS — I63.9 CEREBRAL INFARCTION, UNSPECIFIED: ICD-10-CM

## 2025-01-03 DIAGNOSIS — E11.9 TYPE 2 DIABETES MELLITUS W/OUT COMPLICATIONS: ICD-10-CM

## 2025-01-03 DIAGNOSIS — I48.91 UNSPECIFIED ATRIAL FIBRILLATION: ICD-10-CM

## 2025-01-03 PROCEDURE — 99496 TRANSJ CARE MGMT HIGH F2F 7D: CPT

## 2025-01-03 RX ORDER — DAPAGLIFLOZIN 5 MG/1
5 TABLET, FILM COATED ORAL
Qty: 90 | Refills: 3 | Status: ACTIVE | COMMUNITY
Start: 2025-01-03 | End: 1900-01-01

## 2025-01-03 RX ORDER — METOPROLOL SUCCINATE 25 MG/1
25 TABLET, EXTENDED RELEASE ORAL DAILY
Qty: 90 | Refills: 3 | Status: ACTIVE | COMMUNITY
Start: 1900-01-01 | End: 1900-01-01

## 2025-01-03 RX ORDER — MV-MN/C/THEANINE/HERB NO.310 1000-200MG
POWDER IN PACKET (EA) ORAL
Refills: 0 | Status: DISCONTINUED | COMMUNITY
End: 2025-01-03

## 2025-02-13 ENCOUNTER — APPOINTMENT (OUTPATIENT)
Facility: CLINIC | Age: 75
End: 2025-02-13

## 2025-02-13 ENCOUNTER — NON-APPOINTMENT (OUTPATIENT)
Age: 75
End: 2025-02-13

## 2025-02-13 VITALS
BODY MASS INDEX: 28.19 KG/M2 | SYSTOLIC BLOOD PRESSURE: 139 MMHG | HEART RATE: 91 BPM | DIASTOLIC BLOOD PRESSURE: 74 MMHG | TEMPERATURE: 97.9 F | WEIGHT: 186 LBS | OXYGEN SATURATION: 98 % | HEIGHT: 68 IN

## 2025-02-13 DIAGNOSIS — Z78.9 OTHER SPECIFIED HEALTH STATUS: ICD-10-CM

## 2025-02-13 PROCEDURE — 93000 ELECTROCARDIOGRAM COMPLETE: CPT

## 2025-02-13 PROCEDURE — 99204 OFFICE O/P NEW MOD 45 MIN: CPT | Mod: 25

## 2025-02-14 ENCOUNTER — APPOINTMENT (OUTPATIENT)
Age: 75
End: 2025-02-14
Payer: MEDICARE

## 2025-02-14 VITALS
HEIGHT: 68 IN | DIASTOLIC BLOOD PRESSURE: 78 MMHG | OXYGEN SATURATION: 98 % | HEART RATE: 85 BPM | SYSTOLIC BLOOD PRESSURE: 120 MMHG | WEIGHT: 189 LBS | RESPIRATION RATE: 16 BRPM | TEMPERATURE: 97.7 F | BODY MASS INDEX: 28.64 KG/M2

## 2025-02-14 DIAGNOSIS — R91.1 SOLITARY PULMONARY NODULE: ICD-10-CM

## 2025-02-14 DIAGNOSIS — E11.9 TYPE 2 DIABETES MELLITUS W/OUT COMPLICATIONS: ICD-10-CM

## 2025-02-14 DIAGNOSIS — F01.A2: ICD-10-CM

## 2025-02-14 DIAGNOSIS — R21 RASH AND OTHER NONSPECIFIC SKIN ERUPTION: ICD-10-CM

## 2025-02-14 DIAGNOSIS — I63.9 CEREBRAL INFARCTION, UNSPECIFIED: ICD-10-CM

## 2025-02-14 DIAGNOSIS — I48.91 UNSPECIFIED ATRIAL FIBRILLATION: ICD-10-CM

## 2025-02-14 DIAGNOSIS — E78.2 MIXED HYPERLIPIDEMIA: ICD-10-CM

## 2025-02-14 DIAGNOSIS — E03.9 HYPOTHYROIDISM, UNSPECIFIED: ICD-10-CM

## 2025-02-14 PROBLEM — Z78.9 DOES NOT USE ILLICIT DRUGS: Status: ACTIVE | Noted: 2025-02-14

## 2025-02-14 PROCEDURE — 99214 OFFICE O/P EST MOD 30 MIN: CPT

## 2025-02-14 RX ORDER — TRIAMCINOLONE ACETONIDE 1 MG/G
0.1 CREAM TOPICAL TWICE DAILY
Qty: 1 | Refills: 3 | Status: ACTIVE | COMMUNITY
Start: 2025-02-14 | End: 1900-01-01

## 2025-02-18 ENCOUNTER — RESULT REVIEW (OUTPATIENT)
Age: 75
End: 2025-02-18

## 2025-02-24 ENCOUNTER — APPOINTMENT (OUTPATIENT)
Dept: MRI IMAGING | Facility: CLINIC | Age: 75
End: 2025-02-24
Payer: MEDICARE

## 2025-02-24 ENCOUNTER — APPOINTMENT (OUTPATIENT)
Dept: NEUROLOGY | Facility: CLINIC | Age: 75
End: 2025-02-24
Payer: MEDICARE

## 2025-02-24 ENCOUNTER — OUTPATIENT (OUTPATIENT)
Dept: OUTPATIENT SERVICES | Facility: HOSPITAL | Age: 75
LOS: 1 days | End: 2025-02-24
Payer: MEDICARE

## 2025-02-24 VITALS
SYSTOLIC BLOOD PRESSURE: 124 MMHG | OXYGEN SATURATION: 98 % | BODY MASS INDEX: 28.64 KG/M2 | TEMPERATURE: 97.7 F | DIASTOLIC BLOOD PRESSURE: 78 MMHG | HEART RATE: 72 BPM | WEIGHT: 189 LBS | HEIGHT: 68 IN

## 2025-02-24 VITALS
BODY MASS INDEX: 28.64 KG/M2 | OXYGEN SATURATION: 98 % | HEART RATE: 73 BPM | SYSTOLIC BLOOD PRESSURE: 124 MMHG | WEIGHT: 189 LBS | TEMPERATURE: 97.7 F | DIASTOLIC BLOOD PRESSURE: 78 MMHG | HEIGHT: 68 IN

## 2025-02-24 DIAGNOSIS — I63.9 CEREBRAL INFARCTION, UNSPECIFIED: ICD-10-CM

## 2025-02-24 DIAGNOSIS — Z86.73 PERSONAL HISTORY OF TRANSIENT ISCHEMIC ATTACK (TIA), AND CEREBRAL INFARCTION W/OUT RESIDUAL DEFICITS: ICD-10-CM

## 2025-02-24 DIAGNOSIS — Z87.2 PERSONAL HISTORY OF DISEASES OF THE SKIN AND SUBCUTANEOUS TISSUE: Chronic | ICD-10-CM

## 2025-02-24 PROCEDURE — 70548 MR ANGIOGRAPHY NECK W/DYE: CPT | Mod: 26

## 2025-02-24 PROCEDURE — G2211 COMPLEX E/M VISIT ADD ON: CPT

## 2025-02-24 PROCEDURE — 70548 MR ANGIOGRAPHY NECK W/DYE: CPT

## 2025-02-24 PROCEDURE — A9585: CPT

## 2025-02-24 PROCEDURE — 99214 OFFICE O/P EST MOD 30 MIN: CPT

## 2025-02-24 RX ORDER — CHROMIUM 200 MCG
TABLET ORAL
Refills: 0 | Status: ACTIVE | COMMUNITY

## 2025-03-14 ENCOUNTER — APPOINTMENT (OUTPATIENT)
Age: 75
End: 2025-03-14
Payer: MEDICARE

## 2025-03-14 VITALS
TEMPERATURE: 97.9 F | HEART RATE: 74 BPM | WEIGHT: 189 LBS | SYSTOLIC BLOOD PRESSURE: 129 MMHG | BODY MASS INDEX: 28.64 KG/M2 | OXYGEN SATURATION: 97 % | DIASTOLIC BLOOD PRESSURE: 75 MMHG | HEIGHT: 68 IN

## 2025-03-14 DIAGNOSIS — I48.91 UNSPECIFIED ATRIAL FIBRILLATION: ICD-10-CM

## 2025-03-14 DIAGNOSIS — E11.9 TYPE 2 DIABETES MELLITUS W/OUT COMPLICATIONS: ICD-10-CM

## 2025-03-14 DIAGNOSIS — I63.9 CEREBRAL INFARCTION, UNSPECIFIED: ICD-10-CM

## 2025-03-14 DIAGNOSIS — R53.82 CHRONIC FATIGUE, UNSPECIFIED: ICD-10-CM

## 2025-03-14 PROCEDURE — 99213 OFFICE O/P EST LOW 20 MIN: CPT | Mod: 25

## 2025-03-14 PROCEDURE — 96372 THER/PROPH/DIAG INJ SC/IM: CPT

## 2025-03-14 RX ORDER — CYANOCOBALAMIN 1000 UG/ML
1000 INJECTION, SOLUTION INTRAMUSCULAR; SUBCUTANEOUS
Qty: 0 | Refills: 0 | Status: COMPLETED | OUTPATIENT
Start: 2025-03-14

## 2025-03-14 RX ADMIN — CYANOCOBALAMIN 1 MCG/ML: 1000 INJECTION, SOLUTION INTRAMUSCULAR; SUBCUTANEOUS at 00:00

## 2025-03-31 ENCOUNTER — APPOINTMENT (OUTPATIENT)
Dept: CARDIOLOGY | Facility: CLINIC | Age: 75
End: 2025-03-31

## 2025-04-01 NOTE — PROGRESS NOTE ADULT - PROBLEM SELECTOR PROBLEM 4
Call [phone] ([phone]) to establish care for follow up.  You can also call [facility] at [phone] to establish care.    
Type 2 diabetes mellitus

## 2025-06-06 ENCOUNTER — NON-APPOINTMENT (OUTPATIENT)
Age: 75
End: 2025-06-06

## 2025-07-01 ENCOUNTER — APPOINTMENT (OUTPATIENT)
Age: 75
End: 2025-07-01
Payer: MEDICARE

## 2025-07-01 VITALS
TEMPERATURE: 97.2 F | RESPIRATION RATE: 16 BRPM | WEIGHT: 185 LBS | BODY MASS INDEX: 28.04 KG/M2 | HEART RATE: 113 BPM | HEIGHT: 68 IN | DIASTOLIC BLOOD PRESSURE: 70 MMHG | SYSTOLIC BLOOD PRESSURE: 124 MMHG | OXYGEN SATURATION: 97 %

## 2025-07-01 PROCEDURE — 99214 OFFICE O/P EST MOD 30 MIN: CPT

## 2025-07-01 RX ORDER — SERTRALINE 25 MG/1
25 TABLET, FILM COATED ORAL DAILY
Qty: 90 | Refills: 1 | Status: ACTIVE | COMMUNITY
Start: 2025-07-01 | End: 1900-01-01

## 2025-07-01 RX ORDER — DESVENLAFAXINE 25 MG/1
25 TABLET, EXTENDED RELEASE ORAL
Qty: 90 | Refills: 2 | Status: DISCONTINUED | COMMUNITY
Start: 2025-07-01 | End: 2025-07-01

## 2025-07-15 ENCOUNTER — NON-APPOINTMENT (OUTPATIENT)
Age: 75
End: 2025-07-15

## 2025-07-23 ENCOUNTER — APPOINTMENT (OUTPATIENT)
Age: 75
End: 2025-07-23
Payer: MEDICARE

## 2025-07-23 VITALS
BODY MASS INDEX: 27.89 KG/M2 | WEIGHT: 184 LBS | SYSTOLIC BLOOD PRESSURE: 117 MMHG | RESPIRATION RATE: 16 BRPM | DIASTOLIC BLOOD PRESSURE: 80 MMHG | TEMPERATURE: 97.6 F | HEIGHT: 68 IN | OXYGEN SATURATION: 97 % | HEART RATE: 77 BPM

## 2025-07-23 DIAGNOSIS — R79.89 OTHER SPECIFIED ABNORMAL FINDINGS OF BLOOD CHEMISTRY: ICD-10-CM

## 2025-07-23 DIAGNOSIS — I48.91 UNSPECIFIED ATRIAL FIBRILLATION: ICD-10-CM

## 2025-07-23 DIAGNOSIS — F01.A2: ICD-10-CM

## 2025-07-23 DIAGNOSIS — E11.9 TYPE 2 DIABETES MELLITUS W/OUT COMPLICATIONS: ICD-10-CM

## 2025-07-23 DIAGNOSIS — E03.9 HYPOTHYROIDISM, UNSPECIFIED: ICD-10-CM

## 2025-07-23 DIAGNOSIS — R45.89 OTHER SYMPTOMS AND SIGNS INVOLVING EMOTIONAL STATE: ICD-10-CM

## 2025-07-23 DIAGNOSIS — I63.9 CEREBRAL INFARCTION, UNSPECIFIED: ICD-10-CM

## 2025-07-23 PROCEDURE — 99214 OFFICE O/P EST MOD 30 MIN: CPT

## 2025-09-15 ENCOUNTER — NON-APPOINTMENT (OUTPATIENT)
Age: 75
End: 2025-09-15

## 2025-09-17 ENCOUNTER — RESULT REVIEW (OUTPATIENT)
Age: 75
End: 2025-09-17

## 2025-09-17 ENCOUNTER — TRANSCRIPTION ENCOUNTER (OUTPATIENT)
Age: 75
End: 2025-09-17